# Patient Record
Sex: FEMALE | Race: WHITE | NOT HISPANIC OR LATINO | Employment: FULL TIME | URBAN - METROPOLITAN AREA
[De-identification: names, ages, dates, MRNs, and addresses within clinical notes are randomized per-mention and may not be internally consistent; named-entity substitution may affect disease eponyms.]

---

## 2018-05-10 ENCOUNTER — OFFICE VISIT CONVERTED (OUTPATIENT)
Dept: FAMILY MEDICINE CLINIC | Facility: CLINIC | Age: 58
End: 2018-05-10
Attending: NURSE PRACTITIONER

## 2018-06-05 ENCOUNTER — CONVERSION ENCOUNTER (OUTPATIENT)
Dept: GENERAL RADIOLOGY | Facility: HOSPITAL | Age: 58
End: 2018-06-05

## 2019-01-17 ENCOUNTER — OFFICE VISIT CONVERTED (OUTPATIENT)
Dept: FAMILY MEDICINE CLINIC | Facility: CLINIC | Age: 59
End: 2019-01-17
Attending: NURSE PRACTITIONER

## 2019-12-11 ENCOUNTER — OFFICE VISIT CONVERTED (OUTPATIENT)
Dept: FAMILY MEDICINE CLINIC | Facility: CLINIC | Age: 59
End: 2019-12-11
Attending: NURSE PRACTITIONER

## 2019-12-11 ENCOUNTER — HOSPITAL ENCOUNTER (OUTPATIENT)
Dept: FAMILY MEDICINE CLINIC | Facility: CLINIC | Age: 59
Discharge: HOME | End: 2019-12-11
Attending: NURSE PRACTITIONER

## 2019-12-11 LAB
ALBUMIN SERPL-MCNC: 4.5 G/DL (ref 3.5–5)
ALBUMIN/GLOB SERPL: 1.6 {RATIO} (ref 1.4–2.6)
ALP SERPL-CCNC: 87 U/L (ref 53–141)
ALT SERPL-CCNC: 22 U/L (ref 10–40)
ANION GAP SERPL CALC-SCNC: 19 MMOL/L (ref 8–19)
AST SERPL-CCNC: 22 U/L (ref 15–50)
BASOPHILS # BLD AUTO: 0.04 10*3/UL (ref 0–0.2)
BASOPHILS NFR BLD AUTO: 0.7 % (ref 0–3)
BILIRUB SERPL-MCNC: 0.75 MG/DL (ref 0.2–1.3)
BUN SERPL-MCNC: 16 MG/DL (ref 5–25)
BUN/CREAT SERPL: 22 {RATIO} (ref 6–20)
CALCIUM SERPL-MCNC: 9.6 MG/DL (ref 8.7–10.4)
CHLORIDE SERPL-SCNC: 104 MMOL/L (ref 99–111)
CHOLEST SERPL-MCNC: 203 MG/DL (ref 107–200)
CHOLEST/HDLC SERPL: 2.5 {RATIO} (ref 3–6)
CONV ABS IMM GRAN: 0.01 10*3/UL (ref 0–0.2)
CONV CO2: 24 MMOL/L (ref 22–32)
CONV IMMATURE GRAN: 0.2 % (ref 0–1.8)
CONV TOTAL PROTEIN: 7.4 G/DL (ref 6.3–8.2)
CREAT UR-MCNC: 0.73 MG/DL (ref 0.5–0.9)
DEPRECATED RDW RBC AUTO: 41.7 FL (ref 36.4–46.3)
EOSINOPHIL # BLD AUTO: 0.03 10*3/UL (ref 0–0.7)
EOSINOPHIL # BLD AUTO: 0.5 % (ref 0–7)
ERYTHROCYTE [DISTWIDTH] IN BLOOD BY AUTOMATED COUNT: 13 % (ref 11.7–14.4)
GFR SERPLBLD BASED ON 1.73 SQ M-ARVRAT: >60 ML/MIN/{1.73_M2}
GLOBULIN UR ELPH-MCNC: 2.9 G/DL (ref 2–3.5)
GLUCOSE SERPL-MCNC: 98 MG/DL (ref 65–99)
HCT VFR BLD AUTO: 46 % (ref 37–47)
HDLC SERPL-MCNC: 80 MG/DL (ref 40–60)
HGB BLD-MCNC: 14.9 G/DL (ref 12–16)
LDLC SERPL CALC-MCNC: 110 MG/DL (ref 70–100)
LYMPHOCYTES # BLD AUTO: 1.95 10*3/UL (ref 1–5)
LYMPHOCYTES NFR BLD AUTO: 33.1 % (ref 20–45)
MCH RBC QN AUTO: 28.4 PG (ref 27–31)
MCHC RBC AUTO-ENTMCNC: 32.4 G/DL (ref 33–37)
MCV RBC AUTO: 87.6 FL (ref 81–99)
MONOCYTES # BLD AUTO: 0.41 10*3/UL (ref 0.2–1.2)
MONOCYTES NFR BLD AUTO: 7 % (ref 3–10)
NEUTROPHILS # BLD AUTO: 3.45 10*3/UL (ref 2–8)
NEUTROPHILS NFR BLD AUTO: 58.5 % (ref 30–85)
NRBC CBCN: 0 % (ref 0–0.7)
OSMOLALITY SERPL CALC.SUM OF ELEC: 297 MOSM/KG (ref 273–304)
PLATELET # BLD AUTO: 247 10*3/UL (ref 130–400)
PMV BLD AUTO: 10 FL (ref 9.4–12.3)
POTASSIUM SERPL-SCNC: 3.9 MMOL/L (ref 3.5–5.3)
RBC # BLD AUTO: 5.25 10*6/UL (ref 4.2–5.4)
SODIUM SERPL-SCNC: 143 MMOL/L (ref 135–147)
TRIGL SERPL-MCNC: 66 MG/DL (ref 40–150)
TSH SERPL-ACNC: 1.56 M[IU]/L (ref 0.27–4.2)
VLDLC SERPL-MCNC: 13 MG/DL (ref 5–37)
WBC # BLD AUTO: 5.89 10*3/UL (ref 4.8–10.8)

## 2019-12-18 ENCOUNTER — HOSPITAL ENCOUNTER (OUTPATIENT)
Dept: FAMILY MEDICINE CLINIC | Facility: CLINIC | Age: 59
Discharge: HOME | End: 2019-12-18
Attending: NURSE PRACTITIONER

## 2019-12-18 ENCOUNTER — OFFICE VISIT CONVERTED (OUTPATIENT)
Dept: FAMILY MEDICINE CLINIC | Facility: CLINIC | Age: 59
End: 2019-12-18
Attending: NURSE PRACTITIONER

## 2019-12-18 ENCOUNTER — CONVERSION ENCOUNTER (OUTPATIENT)
Dept: FAMILY MEDICINE CLINIC | Facility: CLINIC | Age: 59
End: 2019-12-18

## 2019-12-19 LAB
CONV LAST MENSTURAL PERIOD: NORMAL
SPECIMEN SOURCE: NORMAL
SPECIMEN SOURCE: NORMAL
THIN PREP CVX: NORMAL

## 2020-01-20 ENCOUNTER — HOSPITAL ENCOUNTER (OUTPATIENT)
Dept: GENERAL RADIOLOGY | Facility: HOSPITAL | Age: 60
Discharge: HOME | End: 2020-01-20
Attending: NURSE PRACTITIONER

## 2020-06-10 ENCOUNTER — OFFICE VISIT CONVERTED (OUTPATIENT)
Dept: FAMILY MEDICINE CLINIC | Facility: CLINIC | Age: 60
End: 2020-06-10
Attending: NURSE PRACTITIONER

## 2020-10-07 ENCOUNTER — HOSPITAL ENCOUNTER (OUTPATIENT)
Dept: PREADMISSION TESTING | Facility: HOSPITAL | Age: 60
Discharge: HOME | End: 2020-10-07
Attending: INTERNAL MEDICINE

## 2020-10-08 LAB — SARS-COV-2 RNA SPEC QL NAA+PROBE: NOT DETECTED

## 2020-10-09 ENCOUNTER — OFFICE VISIT CONVERTED (OUTPATIENT)
Dept: FAMILY MEDICINE CLINIC | Facility: CLINIC | Age: 60
End: 2020-10-09
Attending: NURSE PRACTITIONER

## 2020-10-12 ENCOUNTER — HOSPITAL ENCOUNTER (OUTPATIENT)
Dept: GASTROENTEROLOGY | Facility: HOSPITAL | Age: 60
Setting detail: HOSPITAL OUTPATIENT SURGERY
Discharge: HOME | End: 2020-10-12
Attending: INTERNAL MEDICINE

## 2020-10-12 LAB — GLUCOSE BLD-MCNC: 113 MG/DL (ref 65–99)

## 2021-02-03 ENCOUNTER — HOSPITAL ENCOUNTER (OUTPATIENT)
Dept: GENERAL RADIOLOGY | Facility: HOSPITAL | Age: 61
Discharge: HOME | End: 2021-02-03
Attending: NURSE PRACTITIONER

## 2021-05-13 NOTE — PROGRESS NOTES
Progress Note      Patient Name: Divina Virk   Patient ID: [id number]   Sex: Female   YOB: 1960    Primary Care Provider: Nel MCCOY   Referring Provider: Nel MCCOY    Visit Date: Samantha 10, 2020    Provider: NADINE Batista   Location: [facility]   Location Address: [address] [place] [address]  [id number]   Location Phone: [phone]          Chief Complaint  · Acute Visit for Stress      History Of Present Illness  Divina Virk is a 59 year old /White female who presents for evaluation and treatment of:      Acute visit for stress.  pt has been exercising daily.   anxiety worse with working Covid hotline.    Pt also wants bites on back checked. Pt said she was at [place] this past weekend. pt has Clobetasol but has been using.       Past Medical History  Disease Name Date Onset Notes   Anemia --  --    Anxiety --  --    Depression --  --    Diabetes mellitus type II, controlled --  diet controlled    Menopausal symptom 09/14/2015 --    Pap smear for cervical cancer screening 12/18/19 WNL         Past Surgical History  Procedure Name Date Notes   Colonoscopy 09/11/15 2 small polyps   Dilatation and curettage 11/1/2011 --    Uterine ablation 11/1/2011 --          Allergy List  Allergen Name Date Reaction Notes   Wellbutrin --  --  feeling foggy         Family Medical History  Disease Name Relative/Age Notes   Adopted  --          Social History  Finding Status Start/Stop Quantity Notes   Alcohol Never --/-- --  --    Tobacco Former --/58 1 ppd Quit in 12/2018         Immunizations  NameDate Admin Mfg Trade Name Lot Number Route Inj VIS Given VIS Publication   Lvgjxyqmm17/11/2019 PMC Fluzone Quadrivalent [id number] IM RD 12/11/2019    Comments: injection given. Pt tolerated well. NDC 67835-118-93   Hundrbptg74/17/2019 PMC Fluzone Quadrivalent [id number] IM  01/17/2019 08/07/2015   Comments:          Review of  "Systems  · Constitutional  o Denies  o : fever, fatigue, weight loss, weight gain  · Cardiovascular  o Denies  o : lower extremity edema, claudication, chest pressure, palpitations  · Respiratory  o Denies  o : shortness of breath, wheezing, frequent cough, hemoptysis, dyspnea on exertion  · Gastrointestinal  o Denies  o : nausea, vomiting, diarrhea, constipation, abdominal pain  · Integument  o Admits  o : new skin lesions  · Psychiatric  o Admits  o : anxiety  o Denies  o : depression      Vitals  Date Time BP Position Site L\R Cuff Size HR RR TEMP (F) WT  HT  BMI kg/m2 BSA m2 O2 Sat HC       12/11/2019 08:25 /55 Sitting    69 - R 12 98.4 146lbs 16oz 5'  4\" 25.23 1.74 96 %    12/18/2019 10:25 /97 Sitting    92 - R 12  150lbs 0oz 5'  4\" 25.75 1.75 96 %    06/10/2020 08:54 /53 Sitting    78 - R 12 98.8 148lbs 6oz 5'  4\" 25.47 1.74 97 %          Physical Examination  · Constitutional  o Appearance  o : well-nourished, in no acute distress  · Eyes  o Conjunctivae  o : conjunctivae normal  o Sclerae  o : sclerae white  o Pupils and Irises  o : pupils equal and round  o Eyelids/Ocular Adnexae  o : eyelid appearance normal, no exudates present  · Respiratory  o Respiratory Effort  o : breathing unlabored  o Inspection of Chest  o : normal appearance  o Auscultation of Lungs  o : normal breath sounds throughout inspiration and expiration  · Cardiovascular  o Heart  o :   § Auscultation of Heart  § : regular rate and rhythm, no murmurs, gallops or rubs  · Gastrointestinal  o Abdominal Examination  o : abdomen nontender to palpation, tone normal without rigidity or guarding, no masses present, bowel sounds present  · Skin and Subcutaneous Tissue  o General Inspection  o : excoriations with erythema to back.  o Body Hair  o : hair normal for age, general body hair distribution normal for age  o Digits and Nails  o : no clubbing, cyanosis, deformities or edema present, normal appearing " nails  · Neurologic  o Mental Status Examination  o :   § Orientation  § : grossly oriented to person, place and time  o Gait and Station  o : normal gait, able to stand without difficulty  · Psychiatric  o Judgement and Insight  o : judgment and insight intact  o Mood and Affect  o : mood normal, affect appropriate          Assessment  · Anxiety disorder     300.00/F41.9  take hydroxyzine qid prn.  · Insect bite       Bitten or stung by nonvenomous insect and other nonvenomous arthropods, initial encounter     919.4/W57.XXXA      Plan  · Orders  o ACO-39: Current medications updated and reviewed () - - 06/10/2020  · Medications  o Vistaril 25 mg oral capsule   SIG: take 1 capsule by oral route 3 times a day as needed for 7 days for itching   DISP: (21) capsules with 0 refills  Prescribed on 06/10/2020     o Medications have been Reconciled  o Transition of Care or Provider Policy  · Instructions  o Patient was given an SSRI/SSNRI medication and warned of possible side effects of the medication including potential for increased risk of suicidal thoughts and feelings. Patient was instructed that if they begin to exhibit any of these effects they will discontinue the medication immediately and contact our office or the ER ASAP.  o Patient was educated/instructed on their diagnosis, treatment and medications prior to discharge from the clinic today.  o Call the office with any concerns or questions.  · Disposition  o Call or Return if symptoms worsen or persist.            Electronically Signed by: NADINE Batista -Author on Samantha 10, 2020 09:18:12 AM
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
## 2021-05-14 VITALS
DIASTOLIC BLOOD PRESSURE: 79 MMHG | RESPIRATION RATE: 18 BRPM | OXYGEN SATURATION: 94 % | TEMPERATURE: 99.2 F | HEART RATE: 85 BPM | HEIGHT: 64 IN | BODY MASS INDEX: 25.78 KG/M2 | SYSTOLIC BLOOD PRESSURE: 151 MMHG | WEIGHT: 151 LBS

## 2021-05-15 VITALS
OXYGEN SATURATION: 96 % | HEIGHT: 64 IN | RESPIRATION RATE: 12 BRPM | HEART RATE: 69 BPM | SYSTOLIC BLOOD PRESSURE: 134 MMHG | BODY MASS INDEX: 25.1 KG/M2 | DIASTOLIC BLOOD PRESSURE: 55 MMHG | TEMPERATURE: 98.4 F | WEIGHT: 147 LBS

## 2021-05-15 VITALS
SYSTOLIC BLOOD PRESSURE: 151 MMHG | BODY MASS INDEX: 25.61 KG/M2 | OXYGEN SATURATION: 96 % | WEIGHT: 150 LBS | HEART RATE: 92 BPM | RESPIRATION RATE: 12 BRPM | DIASTOLIC BLOOD PRESSURE: 97 MMHG | HEIGHT: 64 IN

## 2021-05-15 VITALS
SYSTOLIC BLOOD PRESSURE: 100 MMHG | HEIGHT: 64 IN | HEART RATE: 78 BPM | TEMPERATURE: 98.8 F | DIASTOLIC BLOOD PRESSURE: 53 MMHG | OXYGEN SATURATION: 97 % | RESPIRATION RATE: 12 BRPM | WEIGHT: 148.37 LBS | BODY MASS INDEX: 25.33 KG/M2

## 2021-05-16 VITALS
WEIGHT: 139 LBS | HEIGHT: 64 IN | TEMPERATURE: 97.6 F | HEART RATE: 81 BPM | BODY MASS INDEX: 23.73 KG/M2 | RESPIRATION RATE: 21 BRPM | OXYGEN SATURATION: 99 % | SYSTOLIC BLOOD PRESSURE: 142 MMHG | DIASTOLIC BLOOD PRESSURE: 69 MMHG

## 2021-05-16 VITALS
WEIGHT: 152 LBS | OXYGEN SATURATION: 98 % | BODY MASS INDEX: 25.95 KG/M2 | SYSTOLIC BLOOD PRESSURE: 139 MMHG | HEIGHT: 64 IN | RESPIRATION RATE: 23 BRPM | TEMPERATURE: 97.8 F | HEART RATE: 76 BPM | DIASTOLIC BLOOD PRESSURE: 74 MMHG

## 2021-06-24 ENCOUNTER — IMMUNIZATION (OUTPATIENT)
Dept: VACCINE CLINIC | Facility: HOSPITAL | Age: 61
End: 2021-06-24

## 2021-06-24 PROCEDURE — 91300 HC SARSCOV02 VAC 30MCG/0.3ML IM: CPT | Performed by: INTERNAL MEDICINE

## 2021-06-24 PROCEDURE — 0001A: CPT | Performed by: INTERNAL MEDICINE

## 2021-08-04 ENCOUNTER — OFFICE VISIT (OUTPATIENT)
Dept: FAMILY MEDICINE CLINIC | Facility: CLINIC | Age: 61
End: 2021-08-04

## 2021-08-04 VITALS
HEART RATE: 93 BPM | BODY MASS INDEX: 25.13 KG/M2 | DIASTOLIC BLOOD PRESSURE: 73 MMHG | RESPIRATION RATE: 18 BRPM | OXYGEN SATURATION: 96 % | TEMPERATURE: 99.7 F | HEIGHT: 64 IN | SYSTOLIC BLOOD PRESSURE: 126 MMHG | WEIGHT: 147.2 LBS

## 2021-08-04 DIAGNOSIS — Z00.00 ANNUAL PHYSICAL EXAM: Primary | ICD-10-CM

## 2021-08-04 DIAGNOSIS — L30.9 DERMATITIS: ICD-10-CM

## 2021-08-04 DIAGNOSIS — Z13.220 LIPID SCREENING: ICD-10-CM

## 2021-08-04 DIAGNOSIS — Z13.29 SCREENING FOR THYROID DISORDER: ICD-10-CM

## 2021-08-04 DIAGNOSIS — Z01.89 ENCOUNTER FOR ROUTINE LABORATORY TESTING: ICD-10-CM

## 2021-08-04 DIAGNOSIS — Z12.31 VISIT FOR SCREENING MAMMOGRAM: ICD-10-CM

## 2021-08-04 PROBLEM — E11.9 DIABETES MELLITUS TYPE II, CONTROLLED: Status: ACTIVE | Noted: 2021-08-04

## 2021-08-04 PROBLEM — F41.9 ANXIETY: Status: ACTIVE | Noted: 2021-08-04

## 2021-08-04 PROBLEM — D64.9 ANEMIA: Status: ACTIVE | Noted: 2021-08-04

## 2021-08-04 PROBLEM — F32.A DEPRESSION: Status: ACTIVE | Noted: 2021-08-04

## 2021-08-04 LAB
ALBUMIN SERPL-MCNC: 4.3 G/DL (ref 3.5–5.2)
ALBUMIN/GLOB SERPL: 1.7 G/DL
ALP SERPL-CCNC: 83 U/L (ref 39–117)
ALT SERPL W P-5'-P-CCNC: 23 U/L (ref 1–33)
ANION GAP SERPL CALCULATED.3IONS-SCNC: 8.6 MMOL/L (ref 5–15)
AST SERPL-CCNC: 20 U/L (ref 1–32)
BASOPHILS # BLD AUTO: 0.05 10*3/MM3 (ref 0–0.2)
BASOPHILS NFR BLD AUTO: 0.7 % (ref 0–1.5)
BILIRUB SERPL-MCNC: 0.6 MG/DL (ref 0–1.2)
BUN SERPL-MCNC: 14 MG/DL (ref 8–23)
BUN/CREAT SERPL: 20 (ref 7–25)
CALCIUM SPEC-SCNC: 9.3 MG/DL (ref 8.6–10.5)
CHLORIDE SERPL-SCNC: 103 MMOL/L (ref 98–107)
CHOLEST SERPL-MCNC: 210 MG/DL (ref 0–200)
CO2 SERPL-SCNC: 25.4 MMOL/L (ref 22–29)
CREAT SERPL-MCNC: 0.7 MG/DL (ref 0.57–1)
DEPRECATED RDW RBC AUTO: 42.5 FL (ref 37–54)
EOSINOPHIL # BLD AUTO: 0.06 10*3/MM3 (ref 0–0.4)
EOSINOPHIL NFR BLD AUTO: 0.8 % (ref 0.3–6.2)
ERYTHROCYTE [DISTWIDTH] IN BLOOD BY AUTOMATED COUNT: 13.2 % (ref 12.3–15.4)
GFR SERPL CREATININE-BSD FRML MDRD: 85 ML/MIN/1.73
GLOBULIN UR ELPH-MCNC: 2.5 GM/DL
GLUCOSE SERPL-MCNC: 102 MG/DL (ref 65–99)
HCT VFR BLD AUTO: 44.3 % (ref 34–46.6)
HDLC SERPL-MCNC: 70 MG/DL (ref 40–60)
HGB BLD-MCNC: 14.4 G/DL (ref 12–15.9)
IMM GRANULOCYTES # BLD AUTO: 0.01 10*3/MM3 (ref 0–0.05)
IMM GRANULOCYTES NFR BLD AUTO: 0.1 % (ref 0–0.5)
LDLC SERPL CALC-MCNC: 111 MG/DL (ref 0–100)
LDLC/HDLC SERPL: 1.52 {RATIO}
LYMPHOCYTES # BLD AUTO: 2.08 10*3/MM3 (ref 0.7–3.1)
LYMPHOCYTES NFR BLD AUTO: 29.3 % (ref 19.6–45.3)
MCH RBC QN AUTO: 28.6 PG (ref 26.6–33)
MCHC RBC AUTO-ENTMCNC: 32.5 G/DL (ref 31.5–35.7)
MCV RBC AUTO: 87.9 FL (ref 79–97)
MONOCYTES # BLD AUTO: 0.45 10*3/MM3 (ref 0.1–0.9)
MONOCYTES NFR BLD AUTO: 6.3 % (ref 5–12)
NEUTROPHILS NFR BLD AUTO: 4.46 10*3/MM3 (ref 1.7–7)
NEUTROPHILS NFR BLD AUTO: 62.8 % (ref 42.7–76)
NRBC BLD AUTO-RTO: 0 /100 WBC (ref 0–0.2)
PLATELET # BLD AUTO: 256 10*3/MM3 (ref 140–450)
PMV BLD AUTO: 10.3 FL (ref 6–12)
POTASSIUM SERPL-SCNC: 3.9 MMOL/L (ref 3.5–5.2)
PROT SERPL-MCNC: 6.8 G/DL (ref 6–8.5)
RBC # BLD AUTO: 5.04 10*6/MM3 (ref 3.77–5.28)
SODIUM SERPL-SCNC: 137 MMOL/L (ref 136–145)
TRIGL SERPL-MCNC: 169 MG/DL (ref 0–150)
TSH SERPL DL<=0.05 MIU/L-ACNC: 1.2 UIU/ML (ref 0.27–4.2)
VLDLC SERPL-MCNC: 29 MG/DL (ref 5–40)
WBC # BLD AUTO: 7.11 10*3/MM3 (ref 3.4–10.8)

## 2021-08-04 PROCEDURE — 85025 COMPLETE CBC W/AUTO DIFF WBC: CPT | Performed by: NURSE PRACTITIONER

## 2021-08-04 PROCEDURE — 36415 COLL VENOUS BLD VENIPUNCTURE: CPT | Performed by: NURSE PRACTITIONER

## 2021-08-04 PROCEDURE — 80061 LIPID PANEL: CPT | Performed by: NURSE PRACTITIONER

## 2021-08-04 PROCEDURE — 80053 COMPREHEN METABOLIC PANEL: CPT | Performed by: NURSE PRACTITIONER

## 2021-08-04 PROCEDURE — 84443 ASSAY THYROID STIM HORMONE: CPT | Performed by: NURSE PRACTITIONER

## 2021-08-04 PROCEDURE — 99396 PREV VISIT EST AGE 40-64: CPT | Performed by: NURSE PRACTITIONER

## 2021-08-04 RX ORDER — METHYLPREDNISOLONE 4 MG/1
TABLET ORAL
Qty: 1 EACH | Refills: 0 | Status: SHIPPED | OUTPATIENT
Start: 2021-08-04

## 2021-08-04 NOTE — PROGRESS NOTES
Chief Complaint  Rash    Subjective          Divina JENNY Virk is a 60 y.o. female who presents to [facility]     History of Present Illness     Skin lesion - dermatitis with excoriations.    Pt due for routine labs.         PHQ-2 Total Score: 0   PHQ-9 Total Score: 0       Review of Systems   Constitutional: Negative for chills, fatigue and fever.   Respiratory: Negative for cough and shortness of breath.    Cardiovascular: Negative for chest pain and palpitations.   Gastrointestinal: Negative for constipation, diarrhea, nausea and vomiting.   Musculoskeletal: Negative for back pain and neck pain.   Skin: Positive for rash.   Neurological: Negative for dizziness and headaches.          Medical History: has a past medical history of Anemia, Anxiety, Depression, Diabetes mellitus type II, controlled (CMS/[facility]), and Menopausal symptom (09/14/2015).     Surgical History: has a past surgical history that includes Dilation and curettage of uterus (11/01/2011); Laser ablation (11/01/2011); and Colonoscopy (10/12/2020).     Family History: family history is not on file. She was adopted.     Social History: reports that she quit smoking about 2 years ago. She smoked 1.00 pack per day. She has never used smokeless tobacco. She reports that she does not drink alcohol and does not use drugs.    Allergies: Bupropion      Health Maintenance Due   Topic Date Due   • URINE MICROALBUMIN  Never done   • ANNUAL PHYSICAL  Never done   • TDAP/TD VACCINES (1 - Tdap) Never done   • ZOSTER VACCINE (1 of 2) Never done   • Pneumococcal Vaccine 0-64 (1 of 2 - PPSV23) 12/04/2020   • HEPATITIS C SCREENING  Never done   • DIABETIC FOOT EXAM  Never done   • PAP SMEAR  06/24/2021   • HEMOGLOBIN A1C  06/24/2021   • DIABETIC EYE EXAM  Never done            Current Outpatient Medications:   •  methylPREDNISolone (MEDROL) 4 MG dose pack, Take as directed on package instructions., Disp: 1 each, Rfl: 0      Immunization  "History   Administered Date(s) Administered   • COVID-19 (PFIZER) 06/24/2021, 07/21/2021   • Flu Vaccine Quad PF >18YRS 10/09/2020   • Pneumococcal Conjugate 13-Valent (PCV13) 10/09/2020         Objective       Vitals:    08/04/21 0945 08/04/21 0950   BP: 161/51 126/73   Pulse: 85 93   Resp: 18    Temp: 99.7 °F (37.6 °C)    TempSrc: Temporal    SpO2: 96%    Height: 162.6 cm (64\")    PainSc: 0-No pain       Body mass index is 25.92 kg/m².   Wt Readings from Last 3 Encounters:   10/09/20 68.5 kg (151 lb)   06/10/20 67.3 kg (148 lb 6 oz)   12/18/19 68 kg (150 lb)      BP Readings from Last 3 Encounters:   08/04/21 126/73   10/09/20 151/79   06/10/20 100/53        Physical Exam  Vitals reviewed.   Constitutional:       Appearance: Normal appearance. She is well-developed.   HENT:      Head: Normocephalic and atraumatic.   Eyes:      Conjunctiva/sclera: Conjunctivae normal.      Pupils: Pupils are equal, round, and reactive to light.   Cardiovascular:      Rate and Rhythm: Normal rate and regular rhythm.      Heart sounds: Normal heart sounds. No murmur heard.     Pulmonary:      Effort: Pulmonary effort is normal.      Breath sounds: Normal breath sounds. No wheezing or rhonchi.   Abdominal:      General: Bowel sounds are normal. There is no distension.      Palpations: Abdomen is soft.      Tenderness: There is no abdominal tenderness.   Skin:     General: Skin is warm and dry.   Neurological:      Mental Status: She is alert and oriented to person, place, and time.   Psychiatric:         Mood and Affect: Mood and affect normal.         Behavior: Behavior normal.         Thought Content: Thought content normal.         Judgment: Judgment normal.         Result Review :               Data reviewed: mammo             Assessment and Plan        Diagnoses and all orders for this visit:    1. Annual physical exam (Primary)    2. Encounter for routine laboratory testing  -     Comprehensive Metabolic Panel  -     CBC & " Differential  -     TSH  -     Lipid Panel    3. Screening for thyroid disorder  -     TSH    4. Lipid screening  -     Comprehensive Metabolic Panel  -     Lipid Panel    5. Visit for screening mammogram  -     Mammo Screening Digital Tomosynthesis Bilateral With CAD; Future    6. Dermatitis  -     methylPREDNISolone (MEDROL) 4 MG dose pack; Take as directed on package instructions.  Dispense: 1 each; Refill: 0          Follow Up     Return for Pending results.    Patient was given instructions and counseling regarding her condition or for health maintenance advice. Please see specific information pulled into the AVS if appropriate.     NADINE Batista

## 2021-09-07 ENCOUNTER — TELEPHONE (OUTPATIENT)
Dept: FAMILY MEDICINE CLINIC | Facility: CLINIC | Age: 61
End: 2021-09-07

## 2021-09-07 NOTE — TELEPHONE ENCOUNTER
Caller: Divina Virk    Relationship to patient: Self    Best call back number: [phone]    Patient is needing: TO BE SEEN.  GOT DOG BIT OVER THE WEEKEND, WENT TO URGENT CARE THEY TOLD HER TO GO SEE HER PRIMARY CARE DOCTOR WITHIN 10 DAYS.   HUB IS UNABLE TO SCHEDULE ANYTHING WITHIN THAT TIME FRAME

## 2021-09-08 ENCOUNTER — OFFICE VISIT (OUTPATIENT)
Dept: FAMILY MEDICINE CLINIC | Facility: CLINIC | Age: 61
End: 2021-09-08

## 2021-09-08 VITALS
DIASTOLIC BLOOD PRESSURE: 69 MMHG | RESPIRATION RATE: 18 BRPM | OXYGEN SATURATION: 93 % | HEIGHT: 64 IN | BODY MASS INDEX: 25.27 KG/M2 | SYSTOLIC BLOOD PRESSURE: 125 MMHG | HEART RATE: 82 BPM | TEMPERATURE: 98.4 F

## 2021-09-08 DIAGNOSIS — T14.8XXA PUNCTURE WOUND: ICD-10-CM

## 2021-09-08 DIAGNOSIS — T14.8XXA BITE BY ANIMAL: Primary | ICD-10-CM

## 2021-09-08 PROCEDURE — 99213 OFFICE O/P EST LOW 20 MIN: CPT | Performed by: NURSE PRACTITIONER

## 2021-09-08 NOTE — PROGRESS NOTES
Chief Complaint  Animal Bite (Rabies check due to bit by an unvaccinated dog)    Subjective          Divina Virk is a 60 y.o. female who presents to [facility]     History of Present Illness    Bit on Saturday, right thumb.     Pt reports the dog was not being aggressive but she had dropped food. Pt reports she feds the dog often. The dog is a basset hound.          PHQ-2 Total Score:     PHQ-9 Total Score:         Review of Systems   Constitutional: Negative for chills and fever.   Skin: Positive for wound (right thumb without erythema or drainage).          Medical History: has a past medical history of Anemia, Anxiety, Depression, Diabetes mellitus type II, controlled (CMS/[facility]), and Menopausal symptom (09/14/2015).     Surgical History: has a past surgical history that includes Dilation and curettage of uterus (11/01/2011); Laser ablation (11/01/2011); and Colonoscopy (10/12/2020).     Family History: family history is not on file. She was adopted.     Social History: reports that she quit smoking about 2 years ago. She smoked 1.00 pack per day. She has never used smokeless tobacco. She reports that she does not drink alcohol and does not use drugs.    Allergies: Bupropion      Health Maintenance Due   Topic Date Due   • URINE MICROALBUMIN  Never done   • TDAP/TD VACCINES (1 - Tdap) Never done   • ZOSTER VACCINE (1 of 2) Never done   • Pneumococcal Vaccine 0-64 (1 of 2 - PPSV23) 12/04/2020   • HEPATITIS C SCREENING  Never done   • DIABETIC FOOT EXAM  Never done   • PAP SMEAR  06/24/2021   • HEMOGLOBIN A1C  06/24/2021   • DIABETIC EYE EXAM  Never done            Current Outpatient Medications:   •  methylPREDNISolone (MEDROL) 4 MG dose pack, Take as directed on package instructions., Disp: 1 each, Rfl: 0      Immunization History   Administered Date(s) Administered   • COVID-19 (PFIZER) 06/24/2021, 07/21/2021   • Flu Vaccine Quad PF >18YRS 10/09/2020   • Pneumococcal  "Conjugate 13-Valent (PCV13) 10/09/2020         Objective       Vitals:    09/08/21 0708   BP: 125/69   Pulse: 82   Resp: 18   Temp: 98.4 °F (36.9 °C)   TempSrc: Temporal   SpO2: 93%   Height: 162.6 cm (64\")   PainSc: 0-No pain      Body mass index is 25.27 kg/m².   Wt Readings from Last 3 Encounters:   08/04/21 66.8 kg (147 lb 3.2 oz)   10/09/20 68.5 kg (151 lb)   06/10/20 67.3 kg (148 lb 6 oz)      BP Readings from Last 3 Encounters:   09/08/21 125/69   08/04/21 126/73   10/09/20 151/79        Physical Exam  Constitutional:       Appearance: Normal appearance.   HENT:      Head: Normocephalic and atraumatic.      Nose: Nose normal.   Musculoskeletal:      Cervical back: Normal range of motion.   Skin:     General: Skin is warm and dry.      Findings: Wound present.      Comments: Puncture wound on right thumb without erythema or drainage.   Neurological:      Mental Status: She is alert.       Skin clipped from right thumb.      Result Review :               Assessment and Plan        Diagnoses and all orders for this visit:    1. Bite by animal (Primary)    2. Puncture wound    Keep area clean and dry. Monitor for signs and symptoms of infection, redness and drainage. Seek medical attention if noted.       Follow Up     Return if symptoms worsen or fail to improve.    Patient was given instructions and counseling regarding her condition or for health maintenance advice. Please see specific information pulled into the AVS if appropriate.     NADINE Batista    "

## 2021-09-08 NOTE — PATIENT INSTRUCTIONS
Puncture Wound  A puncture wound is an injury that is caused by a sharp, thin object that goes through your skin. A puncture wound usually does not leave a large opening in your skin, so it may not bleed a lot. However, when you get a puncture wound, dirt or other materials (foreign bodies) can be forced into your wound and can break off inside. This increases the chance of infection, such as tetanus. There are many sharp, pointed objects that can cause puncture wounds, including teeth, nails, splinters of glass, fishhooks, and needles.  Treatment may include the following steps:  · Washing out the wound with a germ-free (sterile) salt-water solution.  · Having surgery to open the wound and remove materials from it.  · Closing the wound with stitches (sutures).  · Covering the wound with antibiotic ointment and a bandage (dressing).  Depending on what caused the injury, you may also need a tetanus shot or a rabies shot.  Follow these instructions at home:  Medicines  · Take or apply over-the-counter and prescription medicines only as told by your doctor.  · If you were prescribed an antibiotic medicine, take or apply it as told by your doctor. Do not stop using the antibiotic even if your condition starts to get better.  Bathing  · Keep the bandage dry as told by your doctor.  · Do not take baths, swim, or use a hot tub until your doctor approves. Ask your doctor if you may take showers. You may only be allowed to take sponge baths.  Wound care    · There are many ways to close and cover a wound. For example, a wound can be closed with stitches, skin glue, or skin tape (adhesive strips). Follow instructions from your doctor about how to take care of your wound. Make sure you:  ? Wash your hands with soap and water before and after you change your bandage. If you cannot use soap and water, use hand .  ? Change your bandage as told by your doctor.  ? Leave stitches, skin glue, or skin tape strips in place.  They may need to stay in place for 2 weeks or longer. If tape strips get loose and curl up, you may trim the loose edges. Do not remove tape strips completely unless your doctor says it is okay.  · Clean the wound as told by your doctor.  · Do not scratch or pick at the wound.  · Check your wound every day for signs of infection. Watch for:  ? Redness, swelling, or pain.  ? Fluid or blood.  ? Warmth.  ? Pus or a bad smell.    General instructions  · Raise (elevate) the injured area above the level of your heart while you are sitting or lying down.  · If your puncture wound is in your foot, ask your doctor if you need to avoid putting weight on your foot and for how long. Use crutches as told by your doctor.  · Keep all follow-up visits as told by your doctor. This is important.  Contact a doctor if:  · You got a tetanus shot and you have any of these problems at the injection site:  ? Swelling.  ? Very bad pain.  ? Redness.  ? Bleeding.  · You have a fever.  · Your stitches come out.  · You notice a bad smell coming from your wound or your bandage.  · You notice something coming out of the wound, such as wood or glass.  · Medicine does not help your pain.  · You have more redness, swelling, or pain at the site of your wound.  · You have fluid, blood, or pus coming from your wound.  · You notice a change in the color of your skin near your wound.  · You need to change the bandage often because fluid, blood, or pus is coming from the wound.  · You start to have a new rash.  · You start to lose feeling (have numbness) around the wound.  · You have warmth around your wound.  Get help right away if:  · You have very bad swelling around the wound.  · Your pain quickly gets worse and is very bad.  · You start to get painful skin lumps.  · You have a red streak going away from your wound.  · The wound is on your hand or foot and you:  ? Cannot move a finger or toe like normal.  ? Notice that your fingers or toes look pale  or blue.  Summary  · A puncture wound is an injury that is caused by a sharp, thin object that goes through your skin.  · Treatment may include washing out the wound, having surgery to open the wound to clean it, closing the wound, and covering the wound with a bandage.  · Follow instructions from your doctor about how to take care of your wound.  · Contact your doctor if you have more redness, swelling, or pain at the site of your wound.  · Keep all follow-up visits as told by your doctor. This is important.  This information is not intended to replace advice given to you by your health care provider. Make sure you discuss any questions you have with your health care provider.  Document Revised: 04/27/2021 Document Reviewed: 07/25/2019  Elsevier Patient Education © 2021 [employer]

## 2022-02-14 ENCOUNTER — HOSPITAL ENCOUNTER (OUTPATIENT)
Dept: MAMMOGRAPHY | Facility: HOSPITAL | Age: 62
Discharge: HOME | End: 2022-02-14
Admitting: NURSE PRACTITIONER

## 2022-02-14 DIAGNOSIS — Z12.31 VISIT FOR SCREENING MAMMOGRAM: ICD-10-CM

## 2022-02-14 PROCEDURE — 77067 SCR MAMMO BI INCL CAD: CPT

## 2022-02-14 PROCEDURE — 77063 BREAST TOMOSYNTHESIS BI: CPT

## 2023-02-09 ENCOUNTER — APPOINTMENT (OUTPATIENT)
Dept: CT IMAGING | Facility: HOSPITAL | Age: 63
End: 2023-02-09
Payer: COMMERCIAL

## 2023-02-09 ENCOUNTER — HOSPITAL ENCOUNTER (EMERGENCY)
Facility: HOSPITAL | Age: 63
Discharge: HOME | End: 2023-02-09
Attending: EMERGENCY MEDICINE | Admitting: EMERGENCY MEDICINE
Payer: COMMERCIAL

## 2023-02-09 ENCOUNTER — APPOINTMENT (OUTPATIENT)
Dept: GENERAL RADIOLOGY | Facility: HOSPITAL | Age: 63
End: 2023-02-09
Payer: COMMERCIAL

## 2023-02-09 VITALS
BODY MASS INDEX: 26.16 KG/M2 | SYSTOLIC BLOOD PRESSURE: 152 MMHG | HEIGHT: 64 IN | TEMPERATURE: 98.5 F | HEART RATE: 94 BPM | WEIGHT: 153.22 LBS | DIASTOLIC BLOOD PRESSURE: 88 MMHG | RESPIRATION RATE: 16 BRPM | OXYGEN SATURATION: 97 %

## 2023-02-09 DIAGNOSIS — R42 VERTIGO: Primary | ICD-10-CM

## 2023-02-09 LAB
ALBUMIN SERPL-MCNC: 4.7 G/DL (ref 3.5–5.2)
ALBUMIN/GLOB SERPL: 1.6 G/DL
ALP SERPL-CCNC: 98 U/L (ref 39–117)
ALT SERPL W P-5'-P-CCNC: 29 U/L (ref 1–33)
ANION GAP SERPL CALCULATED.3IONS-SCNC: 11.7 MMOL/L (ref 5–15)
AST SERPL-CCNC: 22 U/L (ref 1–32)
BASOPHILS # BLD AUTO: 0.05 10*3/MM3 (ref 0–0.2)
BASOPHILS NFR BLD AUTO: 0.6 % (ref 0–1.5)
BILIRUB SERPL-MCNC: 0.7 MG/DL (ref 0–1.2)
BUN SERPL-MCNC: 16 MG/DL (ref 8–23)
BUN/CREAT SERPL: 23.5 (ref 7–25)
CALCIUM SPEC-SCNC: 9.8 MG/DL (ref 8.6–10.5)
CHLORIDE SERPL-SCNC: 102 MMOL/L (ref 98–107)
CO2 SERPL-SCNC: 26.3 MMOL/L (ref 22–29)
CREAT SERPL-MCNC: 0.68 MG/DL (ref 0.57–1)
DEPRECATED RDW RBC AUTO: 38.8 FL (ref 37–54)
EGFRCR SERPLBLD CKD-EPI 2021: 98 ML/MIN/1.73
EOSINOPHIL # BLD AUTO: 0.01 10*3/MM3 (ref 0–0.4)
EOSINOPHIL NFR BLD AUTO: 0.1 % (ref 0.3–6.2)
ERYTHROCYTE [DISTWIDTH] IN BLOOD BY AUTOMATED COUNT: 12.4 % (ref 12.3–15.4)
GLOBULIN UR ELPH-MCNC: 2.9 GM/DL
GLUCOSE SERPL-MCNC: 158 MG/DL (ref 65–99)
HCT VFR BLD AUTO: 45.8 % (ref 34–46.6)
HGB BLD-MCNC: 15.4 G/DL (ref 12–15.9)
HOLD SPECIMEN: NORMAL
HOLD SPECIMEN: NORMAL
IMM GRANULOCYTES # BLD AUTO: 0.02 10*3/MM3 (ref 0–0.05)
IMM GRANULOCYTES NFR BLD AUTO: 0.2 % (ref 0–0.5)
LYMPHOCYTES # BLD AUTO: 1.05 10*3/MM3 (ref 0.7–3.1)
LYMPHOCYTES NFR BLD AUTO: 12.1 % (ref 19.6–45.3)
MAGNESIUM SERPL-MCNC: 1.9 MG/DL (ref 1.6–2.4)
MCH RBC QN AUTO: 28.8 PG (ref 26.6–33)
MCHC RBC AUTO-ENTMCNC: 33.6 G/DL (ref 31.5–35.7)
MCV RBC AUTO: 85.8 FL (ref 79–97)
MONOCYTES # BLD AUTO: 0.27 10*3/MM3 (ref 0.1–0.9)
MONOCYTES NFR BLD AUTO: 3.1 % (ref 5–12)
NEUTROPHILS NFR BLD AUTO: 7.31 10*3/MM3 (ref 1.7–7)
NEUTROPHILS NFR BLD AUTO: 83.9 % (ref 42.7–76)
NRBC BLD AUTO-RTO: 0 /100 WBC (ref 0–0.2)
PLATELET # BLD AUTO: 258 10*3/MM3 (ref 140–450)
PMV BLD AUTO: 9.6 FL (ref 6–12)
POTASSIUM SERPL-SCNC: 4.1 MMOL/L (ref 3.5–5.2)
PROT SERPL-MCNC: 7.6 G/DL (ref 6–8.5)
RBC # BLD AUTO: 5.34 10*6/MM3 (ref 3.77–5.28)
SODIUM SERPL-SCNC: 140 MMOL/L (ref 136–145)
TROPONIN T SERPL HS-MCNC: <6 NG/L
WBC NRBC COR # BLD: 8.71 10*3/MM3 (ref 3.4–10.8)
WHOLE BLOOD HOLD COAG: NORMAL
WHOLE BLOOD HOLD SPECIMEN: NORMAL

## 2023-02-09 PROCEDURE — 71045 X-RAY EXAM CHEST 1 VIEW: CPT

## 2023-02-09 PROCEDURE — 93005 ELECTROCARDIOGRAM TRACING: CPT

## 2023-02-09 PROCEDURE — 70450 CT HEAD/BRAIN W/O DYE: CPT

## 2023-02-09 PROCEDURE — 85025 COMPLETE CBC W/AUTO DIFF WBC: CPT

## 2023-02-09 PROCEDURE — 84484 ASSAY OF TROPONIN QUANT: CPT | Performed by: EMERGENCY MEDICINE

## 2023-02-09 PROCEDURE — 83735 ASSAY OF MAGNESIUM: CPT | Performed by: EMERGENCY MEDICINE

## 2023-02-09 PROCEDURE — 80053 COMPREHEN METABOLIC PANEL: CPT | Performed by: EMERGENCY MEDICINE

## 2023-02-09 PROCEDURE — 36415 COLL VENOUS BLD VENIPUNCTURE: CPT

## 2023-02-09 PROCEDURE — 99283 EMERGENCY DEPT VISIT LOW MDM: CPT

## 2023-02-09 PROCEDURE — 93005 ELECTROCARDIOGRAM TRACING: CPT | Performed by: EMERGENCY MEDICINE

## 2023-02-09 RX ORDER — MECLIZINE HYDROCHLORIDE 25 MG/1
25 TABLET ORAL 3 TIMES DAILY PRN
Qty: 30 TABLET | Refills: 0 | Status: SHIPPED | OUTPATIENT
Start: 2023-02-09

## 2023-02-09 RX ORDER — SODIUM CHLORIDE 0.9 % (FLUSH) 0.9 %
10 SYRINGE (ML) INJECTION AS NEEDED
Status: DISCONTINUED | OUTPATIENT
Start: 2023-02-09 | End: 2023-02-09 | Stop reason: HOSPADM

## 2023-02-09 NOTE — ED PROVIDER NOTES
Time: 6:52 AM EST  Date of encounter:  2023  Independent Historian/Clinical History and Information was obtained by:   Patient  Chief Complaint: dizziness    History is limited by: N/A    History of Present Illness:  Patient is a 62 y.o. year old female who presents to the emergency department for evaluation of dizziness since 0130. Pt woke up at 0130 to the room spinning, she had to crawl a out of bed. She also had n/v. Her sxs lasted 2 hours. Pt states her sxs resolved on the way to the ER. She denies hx of vertigo.      History provided by:  Patient   used: No        Patient Care Team  Primary Care Provider: Nel Juarez APRN    Past Medical History:     Allergies   Allergen Reactions   • Bupropion Unknown - Low Severity     Didn't like the way she felt taking     Past Medical History:   Diagnosis Date   • Anemia    • Anxiety    • Depression    • Diabetes mellitus type II, controlled ([facility])     diet controlled   • Menopausal symptom 2015     Past Surgical History:   Procedure Laterality Date   • COLONOSCOPY  10/12/2020    9/11/15 2 small polyps   • DILATATION AND CURETTAGE  2011   • LASER ABLATION  2011    Uterine Ablation     Family History   Adopted: Yes       Home Medications:  Prior to Admission medications    Medication Sig Start Date End Date Taking? Authorizing Provider   methylPREDNISolone (MEDROL) 4 MG dose pack Take as directed on package instructions. 21   Nel Juarez APRN        Social History:   Social History     Tobacco Use   • Smoking status: Former     Packs/day: 1.00     Types: Cigarettes     Quit date: 2018     Years since quittin.1   • Smokeless tobacco: Never   Vaping Use   • Vaping Use: Never used   Substance Use Topics   • Alcohol use: Never   • Drug use: Never         Review of Systems:  Review of Systems   Constitutional: Negative for chills and fever.   HENT: Negative for congestion, rhinorrhea and sore throat.    Eyes:  "Negative for pain and visual disturbance.   Respiratory: Negative for apnea, cough, chest tightness and shortness of breath.    Cardiovascular: Negative for chest pain and palpitations.   Gastrointestinal: Positive for nausea and vomiting. Negative for abdominal pain and diarrhea.   Genitourinary: Negative for difficulty urinating and dysuria.   Musculoskeletal: Negative for joint swelling and myalgias.   Skin: Negative for color change.   Neurological: Positive for dizziness. Negative for seizures and headaches.   Psychiatric/Behavioral: Negative.    All other systems reviewed and are negative.       Physical Exam:  /88 (BP Location: Right arm, Patient Position: Lying)   Pulse 94   Temp 98.5 °F (36.9 °C) (Oral)   Resp 16   Ht 162.6 cm (64\")   Wt 69.5 kg (153 lb 3.5 oz)   SpO2 97%   BMI 26.30 kg/m²     Physical Exam  Vitals and nursing note reviewed.   Constitutional:       General: She is not in acute distress.     Appearance: Normal appearance. She is not toxic-appearing.   HENT:      Head: Normocephalic and atraumatic.      Jaw: There is normal jaw occlusion.   Eyes:      General: Lids are normal.      Extraocular Movements: Extraocular movements intact.      Right eye: No nystagmus.      Left eye: No nystagmus.      Conjunctiva/sclera: Conjunctivae normal.      Pupils: Pupils are equal, round, and reactive to light.   Cardiovascular:      Rate and Rhythm: Normal rate and regular rhythm.      Pulses: Normal pulses.      Heart sounds: Normal heart sounds.   Pulmonary:      Effort: Pulmonary effort is normal. No respiratory distress.      Breath sounds: Normal breath sounds. No wheezing or rhonchi.   Abdominal:      General: Abdomen is flat.      Palpations: Abdomen is soft.      Tenderness: There is no abdominal tenderness. There is no guarding or rebound.   Musculoskeletal:         General: Normal range of motion.      Cervical back: Normal range of motion and neck supple.      Right lower leg: No " edema.      Left lower leg: No edema.   Skin:     General: Skin is warm and dry.   Neurological:      Mental Status: She is alert and oriented to person, place, and time. Mental status is at baseline.   Psychiatric:         Mood and Affect: Mood normal.                  Procedures:  Procedures      Medical Decision Making:      Comorbidities that affect care:    Diabetes    External Notes reviewed:    Previous Clinic Note: Office visit      The following orders were placed and all results were independently analyzed by me:  Orders Placed This Encounter   Procedures   • XR Chest 1 View   • CT Head Without Contrast   • [place] Draw   • Comprehensive Metabolic Panel   • Troponin   • Magnesium   • CBC Auto Differential   • NPO Diet NPO Type: Strict NPO   • Undress & Gown   • Cardiac Monitoring   • Continuous Pulse Oximetry   • Vital Signs   • Orthostatic Blood Pressure   • Oxygen Therapy- Nasal Cannula; 2 LPM; Titrate for SPO2: 92%, Greater Than or Equal To   • POC Glucose Once   • ECG 12 Lead ED Triage Standing Order; Weak / Dizzy / AMS   • Insert Peripheral IV   • Fall Precautions   • CBC & Differential   • Green Top (Gel)   • Lavender Top   • Gold Top - SST   • Light Blue Top       Medications Given in the Emergency Department:  Medications   sodium chloride 0.9 % flush 10 mL (has no administration in time range)        ED Course:         Labs:    Lab Results (last 24 hours)     Procedure Component Value Units Date/Time    CBC & Differential [[id number]]  (Abnormal) Collected: 02/09/23 0558    Specimen: Blood Updated: 02/09/23 0614    Narrative:      The following orders were created for panel order CBC & Differential.  Procedure                               Abnormality         Status                     ---------                               -----------         ------                     CBC Auto Differential[[id number]]        Abnormal            Final result                 Please view results for these tests on  the individual orders.    Comprehensive Metabolic Panel [[id number]]  (Abnormal) Collected: 02/09/23 0558    Specimen: Blood Updated: 02/09/23 0633     Glucose 158 mg/dL      BUN 16 mg/dL      Creatinine 0.68 mg/dL      Sodium 140 mmol/L      Potassium 4.1 mmol/L      Chloride 102 mmol/L      CO2 26.3 mmol/L      Calcium 9.8 mg/dL      Total Protein 7.6 g/dL      Albumin 4.7 g/dL      ALT (SGPT) 29 U/L      AST (SGOT) 22 U/L      Alkaline Phosphatase 98 U/L      Total Bilirubin 0.7 mg/dL      Globulin 2.9 gm/dL      A/G Ratio 1.6 g/dL      BUN/Creatinine Ratio 23.5     Anion Gap 11.7 mmol/L      eGFR 98.0 mL/min/1.73     Narrative:      GFR Normal >60  Chronic Kidney Disease <60  Kidney Failure <15      Troponin [[id number]]  (Normal) Collected: 02/09/23 0558    Specimen: Blood Updated: 02/09/23 0633     HS Troponin T <6 ng/L     Narrative:      High Sensitive Troponin T Reference Range:  <10.0 ng/L- Negative Female for AMI  <15.0 ng/L- Negative Male for AMI  >=10 - Abnormal Female indicating possible myocardial injury.  >=15 - Abnormal Male indicating possible myocardial injury.   Clinicians would have to utilize clinical acumen, EKG, Troponin, and serial changes to determine if it is an Acute Myocardial Infarction or myocardial injury due to an underlying chronic condition.         Magnesium [[id number]]  (Normal) Collected: 02/09/23 0558    Specimen: Blood Updated: 02/09/23 0633     Magnesium 1.9 mg/dL     CBC Auto Differential [[id number]]  (Abnormal) Collected: 02/09/23 0558    Specimen: Blood Updated: 02/09/23 0614     WBC 8.71 10*3/mm3      RBC 5.34 10*6/mm3      Hemoglobin 15.4 g/dL      Hematocrit 45.8 %      MCV 85.8 fL      MCH 28.8 pg      MCHC 33.6 g/dL      RDW 12.4 %      RDW-SD 38.8 fl      MPV 9.6 fL      Platelets 258 10*3/mm3      Neutrophil % 83.9 %      Lymphocyte % 12.1 %      Monocyte % 3.1 %      Eosinophil % 0.1 %      Basophil % 0.6 %      Immature Grans % 0.2 %      Neutrophils, Absolute 7.31  10*3/mm3      Lymphocytes, Absolute 1.05 10*3/mm3      Monocytes, Absolute 0.27 10*3/mm3      Eosinophils, Absolute 0.01 10*3/mm3      Basophils, Absolute 0.05 10*3/mm3      Immature Grans, Absolute 0.02 10*3/mm3      nRBC 0.0 /100 WBC            Imaging:    CT Head Without Contrast    Result Date: 2/9/2023  PROCEDURE: CT HEAD WO CONTRAST  COMPARISON:  None INDICATIONS: DIZZINESS THIS MORNING.  NO OTHER COMPLAINTS  PROTOCOL:   Standard imaging protocol performed    RADIATION:   DLP: 1017.2 mGy*cm   MA and/or KV was adjusted to minimize radiation dose.     TECHNIQUE: After obtaining the patient's consent, CT images were obtained without non-ionic intravenous contrast material.  FINDINGS:  There is no acute intracranial hemorrhage or extra-axial collection. The ventricles appear normal in caliber, with no evidence of mass effect or midline shift. The basal cisterns are patent. The gray-white differentiation is preserved.  The calvarium is intact. The paranasal sinuses and mastoid air cells are well-aerated.       No acute intracranial process identified.     ALONZO DAVIS MD       Electronically Signed and Approved By: ALONZO DAVIS MD on 2/09/2023 at 6:58             XR Chest 1 View    Result Date: 2/9/2023  PROCEDURE: XR CHEST 1 VW  COMPARISON: [facility], , CHEST AP/PA 1 VIEW, 3/14/2019, 10:31.  INDICATIONS: Weak/Dizzy/AMS triage protocol  FINDINGS:  LUNGS: Normal.  No significant pulmonary parenchymal abnormalities.  VASCULATURE: Normal.  Unremarkable pulmonary vasculature.  CARDIAC: Normal.  No cardiac silhouette abnormality or cardiomegaly.  MEDIASTINUM: Normal.  No visible mass or adenopathy.  PLEURA: Normal.  No effusion or pleural thickening.  BONES: Normal.  No fracture or visible bony lesion.  OTHER: Negative.        Normal examination.        ALONZO DAVIS MD       Electronically Signed and Approved By: ALONZO DAVIS MD on 2/09/2023 at 6:15                 Differential Diagnosis  and Discussion:    Dizziness: Based on the patient's history, signs, and symptoms, the diffential diagnosis includes but is not limited to meningitis, stroke, sepsis, subarachnoid hemorrhage, intracranial bleeding, encephalitis, vertigo, electrolyte imbalance, and metabolic disorders.    All labs were reviewed and interpreted by me.  All X-rays were independently reviewed by me.  EKG was interpreted by me.  CT scan radiology interpretation was reviewed by me.    MDM  Number of Diagnoses or Management Options  Vertigo  Diagnosis management comments: In summary this is a 62-year-old female who presents to the emergency department for evaluation of sudden onset of vertigo upon waking this morning.  She reports she woke up in the [place] to go to the restroom and was so dizzy she had to crawl to the restroom.  After resting for a couple of hours she is course of the symptoms have resolved.  In the emergency department she is ambulated to the restroom without difficulty or assistance.  CT the brain is unremarkable CBC independently reviewed by me and shows no critical abnormalities.  CMP independently reviewed by me and shows no critical abnormalities.  Chest x-ray unremarkable.  High-sensitivity troponin normal.  Patient has a normal physical examination at this time as well.  Her symptoms and presentation are consistent with BPPV and she will be prescribed meclizine.  She is also been instructed to follow-up as needed with physical therapy.       Amount and/or Complexity of Data Reviewed  Clinical lab tests: reviewed  Tests in the radiology section of CPT®: reviewed  Tests in the medicine section of CPT®: reviewed             Patient Care Considerations:    MRI: I considered ordering an MRI however Patient's CT brain was unremarkable and her symptoms have resolved.      Consultants/Shared Management Plan:    None    Social Determinants of Health:    Patient is independent, reliable, and has access to care.        Disposition and Care Coordination:    Discharged: The patient is suitable and stable for discharge with no need for consideration of observation or admission.    I have explained the patient´s condition, diagnoses and treatment plan based on the information available to me at this time. I have answered questions and addressed any concerns. The patient has a good  understanding of the patient´s diagnosis, condition, and treatment plan as can be expected at this point. The vital signs have been stable. The patient´s condition is stable and appropriate for discharge from the emergency department.      The patient will pursue further outpatient evaluation with the primary care physician or other designated or consulting physician as outlined in the discharge instructions. They are agreeable to this plan of care and follow-up instructions have been explained in detail. The patient has received these instructions in written format and have expressed an understanding of the discharge instructions. The patient is aware that any significant change in condition or worsening of symptoms should prompt an immediate return to this or the closest emergency department or call to 911.  I have explained discharge medications and the need for follow up with the patient/caretakers. This was also printed in the discharge instructions. Patient was discharged with the following medications and follow up:      Medication List      New Prescriptions    meclizine 25 MG tablet  Commonly known as: ANTIVERT  Take 1 tablet by mouth 3 (Three) Times a Day As Needed for Dizziness.           Where to Get Your Medications      These medications were sent to [facility] [employer] [id number] - [place] - [address] AT [facility] OF [address] /[facility] & [place] - [phone] [facility] [phone]   [address] [place] [address]    Phone: [phone]   · meclizine 25 MG tablet      Nel Juarez APRN  [address]  [place]  [place] [address]  [phone]    In 1 week         Final diagnoses:   Vertigo        ED Disposition     ED Disposition   Discharge    Condition   Stable    Comment   --             This medical record created using voice recognition software.    Documentation assistance provided by Idris Fleming acting as scribe for No att. providers found. Information recorded by the scribe was done at my direction and has been verified and validated by me.          Idris Fleming  02/09/23 0659       Jaya Davis MD  02/09/23 0749

## 2023-02-09 NOTE — ED TRIAGE NOTES
"Pt to ED from home with reports of waking up at 0130 \"and the whole room was spinning.  I couldn't even walk, I had to crawl to go to the bathroom.  I lay down for awhile and it gets better and then I stand up and it gets better\".      Pt states symptoms seem to be resolved upon arrival to ED.  " Denied   Psychiatry to refill  MyChart sent to pt  Aria DEL TORO RN

## 2023-02-15 LAB — QT INTERVAL: 355 MS

## 2023-04-25 ENCOUNTER — TRANSCRIBE ORDERS (OUTPATIENT)
Dept: ADMINISTRATIVE | Facility: HOSPITAL | Age: 63
End: 2023-04-25
Payer: COMMERCIAL

## 2023-04-25 DIAGNOSIS — Z12.31 SCREENING MAMMOGRAM, ENCOUNTER FOR: Primary | ICD-10-CM

## 2023-07-26 ENCOUNTER — OFFICE VISIT (OUTPATIENT)
Dept: FAMILY MEDICINE CLINIC | Facility: CLINIC | Age: 63
End: 2023-07-26
Payer: COMMERCIAL

## 2023-07-26 VITALS
OXYGEN SATURATION: 99 % | HEART RATE: 101 BPM | DIASTOLIC BLOOD PRESSURE: 100 MMHG | TEMPERATURE: 99.5 F | SYSTOLIC BLOOD PRESSURE: 171 MMHG | BODY MASS INDEX: 26.23 KG/M2 | WEIGHT: 152.8 LBS

## 2023-07-26 DIAGNOSIS — F41.0 PANIC ATTACKS: Primary | ICD-10-CM

## 2023-07-26 DIAGNOSIS — Z11.59 NEED FOR HEPATITIS C SCREENING TEST: ICD-10-CM

## 2023-07-26 DIAGNOSIS — Z79.899 LONG-TERM USE OF HIGH-RISK MEDICATION: ICD-10-CM

## 2023-07-26 LAB
ALBUMIN SERPL-MCNC: 4.5 G/DL (ref 3.5–5.2)
ALBUMIN/GLOB SERPL: 1.7 G/DL
ALP SERPL-CCNC: 86 U/L (ref 39–117)
ALT SERPL W P-5'-P-CCNC: 27 U/L (ref 1–33)
AMPHET+METHAMPHET UR QL: NEGATIVE
AMPHETAMINE INTERNAL CONTROL: ABNORMAL
AMPHETAMINES UR QL: NEGATIVE
ANION GAP SERPL CALCULATED.3IONS-SCNC: 13.3 MMOL/L (ref 5–15)
AST SERPL-CCNC: 22 U/L (ref 1–32)
BARBITURATE INTERNAL CONTROL: ABNORMAL
BARBITURATES UR QL SCN: NEGATIVE
BENZODIAZ UR QL SCN: NEGATIVE
BENZODIAZEPINE INTERNAL CONTROL: ABNORMAL
BILIRUB SERPL-MCNC: 0.8 MG/DL (ref 0–1.2)
BUN SERPL-MCNC: 12 MG/DL (ref 8–23)
BUN/CREAT SERPL: 15.6 (ref 7–25)
BUPRENORPHINE INTERNAL CONTROL: ABNORMAL
BUPRENORPHINE SERPL-MCNC: NEGATIVE NG/ML
CALCIUM SPEC-SCNC: 9.3 MG/DL (ref 8.6–10.5)
CANNABINOIDS SERPL QL: POSITIVE
CHLORIDE SERPL-SCNC: 103 MMOL/L (ref 98–107)
CO2 SERPL-SCNC: 22.7 MMOL/L (ref 22–29)
COCAINE INTERNAL CONTROL: ABNORMAL
COCAINE UR QL: NEGATIVE
CREAT SERPL-MCNC: 0.77 MG/DL (ref 0.57–1)
DEPRECATED RDW RBC AUTO: 38.1 FL (ref 37–54)
EGFRCR SERPLBLD CKD-EPI 2021: 86.8 ML/MIN/1.73
ERYTHROCYTE [DISTWIDTH] IN BLOOD BY AUTOMATED COUNT: 12.6 % (ref 12.3–15.4)
EXPIRATION DATE: ABNORMAL
GLOBULIN UR ELPH-MCNC: 2.6 GM/DL
GLUCOSE SERPL-MCNC: 138 MG/DL (ref 65–99)
HCT VFR BLD AUTO: 45.5 % (ref 34–46.6)
HCV AB SER DONR QL: NORMAL
HGB BLD-MCNC: 15.4 G/DL (ref 12–15.9)
Lab: ABNORMAL
MCH RBC QN AUTO: 28.7 PG (ref 26.6–33)
MCHC RBC AUTO-ENTMCNC: 33.8 G/DL (ref 31.5–35.7)
MCV RBC AUTO: 84.9 FL (ref 79–97)
MDMA (ECSTASY) INTERNAL CONTROL: ABNORMAL
MDMA UR QL SCN: NEGATIVE
METHADONE INTERNAL CONTROL: ABNORMAL
METHADONE UR QL SCN: NEGATIVE
METHAMPHETAMINE INTERNAL CONTROL: ABNORMAL
OPIATES INTERNAL CONTROL: ABNORMAL
OPIATES UR QL: NEGATIVE
OXYCODONE INTERNAL CONTROL: ABNORMAL
OXYCODONE UR QL SCN: NEGATIVE
PCP UR QL SCN: NEGATIVE
PHENCYCLIDINE INTERNAL CONTROL: ABNORMAL
PLATELET # BLD AUTO: 298 10*3/MM3 (ref 140–450)
PMV BLD AUTO: 10 FL (ref 6–12)
POTASSIUM SERPL-SCNC: 3.5 MMOL/L (ref 3.5–5.2)
PROT SERPL-MCNC: 7.1 G/DL (ref 6–8.5)
RBC # BLD AUTO: 5.36 10*6/MM3 (ref 3.77–5.28)
SODIUM SERPL-SCNC: 139 MMOL/L (ref 136–145)
THC INTERNAL CONTROL: ABNORMAL
TSH SERPL DL<=0.05 MIU/L-ACNC: 0.91 UIU/ML (ref 0.27–4.2)
WBC NRBC COR # BLD: 8.79 10*3/MM3 (ref 3.4–10.8)

## 2023-07-26 PROCEDURE — 80050 GENERAL HEALTH PANEL: CPT | Performed by: NURSE PRACTITIONER

## 2023-07-26 PROCEDURE — 86803 HEPATITIS C AB TEST: CPT | Performed by: NURSE PRACTITIONER

## 2023-07-26 RX ORDER — CLONAZEPAM 0.5 MG/1
0.5 TABLET ORAL 2 TIMES DAILY PRN
Qty: 30 TABLET | Refills: 0 | Status: SHIPPED | OUTPATIENT
Start: 2023-07-26

## 2023-07-26 NOTE — LETTER
July 26, 2023     Patient: Divina Virk   YOB: 1960   Date of Visit: 7/26/2023       To Whom It May Concern:    It is my medical opinion that Divina Virk should remain out of work until 8/2/2023 .           Sincerely,        NADINE Batista

## 2023-07-26 NOTE — PROGRESS NOTES
Chief Complaint  Hypertension (For a few weeks/ unsure if that is the problem)    Subjective          Divina Virk is a 62 y.o. female who presents to [facility]    History of Present Illness    Pt was treated for tick bite with doxycycline and reports she had near syncope. Pt is having dizziness, and is having trouble with speaking full sentences.     Elevated blood pressure today with anxiety.            Review of Systems   Constitutional:  Negative for chills, fatigue and fever.   Respiratory:  Positive for shortness of breath. Negative for cough.    Cardiovascular:  Negative for chest pain and palpitations.   Gastrointestinal:  Negative for constipation, diarrhea, nausea and vomiting.   Musculoskeletal:  Negative for back pain and neck pain.   Skin:  Negative for rash.   Neurological:  Positive for numbness. Negative for dizziness and headaches.   Psychiatric/Behavioral:  Positive for sleep disturbance. Negative for self-injury and suicidal ideas. The patient is nervous/anxious.         Medical History: has a past medical history of Anemia, Anxiety, Depression, Diabetes mellitus type II, controlled, and Menopausal symptom (09/14/2015).     Surgical History: has a past surgical history that includes Dilation and curettage of uterus (11/01/2011); Laser ablation (11/01/2011); and Colonoscopy (10/12/2020).     Family History: family history is not on file. She was adopted.     Social History: reports that she quit smoking about 4 years ago. Her smoking use included cigarettes. She smoked an average of 1 pack per day. She has never used smokeless tobacco. She reports that she does not drink alcohol and does not use drugs.    Allergies: Bupropion      Health Maintenance Due   Topic Date Due    URINE MICROALBUMIN  Never done    TDAP/TD VACCINES (1 - Tdap) Never done    Pneumococcal Vaccine 0-64 (2 - PPSV23 or PCV20) 12/04/2020    HEPATITIS C SCREENING  Never done    DIABETIC FOOT  EXAM  Never done    PAP SMEAR  06/24/2021    HEMOGLOBIN A1C  06/24/2021    DIABETIC EYE EXAM  Never done    COVID-19 Vaccine (3 - Pfizer series) 09/15/2021    ANNUAL PHYSICAL  08/04/2022    ZOSTER VACCINE (2 of 2) 03/07/2023            Current Outpatient Medications:     clobetasol (TEMOVATE) 0.05 % gel, Apply 1 application  topically to the appropriate area as directed Every 12 (Twelve) Hours. (Patient not taking: Reported on 7/26/2023), Disp: 30 g, Rfl: 1    clonazePAM (KlonoPIN) 0.5 MG tablet, Take 1 tablet by mouth 2 (Two) Times a Day As Needed for Anxiety., Disp: 30 tablet, Rfl: 0    meclizine (ANTIVERT) 25 MG tablet, Take 1 tablet by mouth 3 (Three) Times a Day As Needed for Dizziness. (Patient not taking: Reported on 7/5/2023), Disp: 30 tablet, Rfl: 0      Immunization History   Administered Date(s) Administered    COVID-19 (PFIZER) Purple Cap Monovalent 06/24/2021, 07/21/2021    Fluzone Quad >6mos (Multi-dose) 10/09/2020    Pneumococcal Conjugate 13-Valent (PCV13) 10/09/2020         Objective       Vitals:    07/26/23 1006 07/26/23 1009 07/26/23 1037   BP: (!) 189/86 (!) 170/101 171/100   Pulse: 101     Temp: 99.5 °F (37.5 °C)     TempSrc: Temporal     SpO2: 99%     Weight: 69.3 kg (152 lb 12.8 oz)        Body mass index is 26.23 kg/m².   Wt Readings from Last 3 Encounters:   07/26/23 69.3 kg (152 lb 12.8 oz)   02/09/23 69.5 kg (153 lb 3.5 oz)   08/04/21 66.8 kg (147 lb 3.2 oz)      BP Readings from Last 3 Encounters:   07/26/23 171/100   02/09/23 152/88   09/08/21 125/69            Physical Exam  Vitals reviewed.   Constitutional:       General: She is in acute distress.      Appearance: She is well-developed. She is not ill-appearing.   HENT:      Head: Normocephalic and atraumatic.   Eyes:      Conjunctiva/sclera: Conjunctivae normal.      Pupils: Pupils are equal, round, and reactive to light.   Cardiovascular:      Rate and Rhythm: Normal rate and regular rhythm.      Heart sounds: Normal heart sounds.  No murmur heard.  Pulmonary:      Effort: Pulmonary effort is normal.      Breath sounds: Normal breath sounds. No wheezing or rhonchi.   Abdominal:      General: Bowel sounds are normal. There is no distension.      Palpations: Abdomen is soft.      Tenderness: There is no abdominal tenderness.   Skin:     General: Skin is warm and dry.   Neurological:      Mental Status: She is alert and oriented to person, place, and time.   Psychiatric:         Mood and Affect: Mood is anxious. Affect is flat.         Behavior: Behavior normal.         Thought Content: Thought content normal. Thought content is not delusional. Thought content does not include homicidal or suicidal ideation. Thought content does not include homicidal or suicidal plan.         Judgment: Judgment normal.       Pt is not able to speak full sentences and is hyperventilating.     Result Review :       Common labs          2/9/2023    05:58   Common Labs   Glucose 158    BUN 16    Creatinine 0.68    Sodium 140    Potassium 4.1    Chloride 102    Calcium 9.8    Albumin 4.7    Total Bilirubin 0.7    Alkaline Phosphatase 98    AST (SGOT) 22    ALT (SGPT) 29    WBC 8.71    Hemoglobin 15.4    Hematocrit 45.8    Platelets 258                       Assessment and Plan        Diagnoses and all orders for this visit:    1. Panic attacks (Primary)  -     TSH  -     CBC (No Diff)  -     Comprehensive metabolic panel  -     clonazePAM (KlonoPIN) 0.5 MG tablet; Take 1 tablet by mouth 2 (Two) Times a Day As Needed for Anxiety.  Dispense: 30 tablet; Refill: 0    2. Long-term use of high-risk medication  -     POC Urine Drug Screen [facility]    3. Need for hepatitis C screening test  -     Hepatitis C antibody; Future  -     Hepatitis C antibody      Pt was given breathing instructions and advised to monitor her posturing which is causing neck pain.     Elevated blood pressure is most likely related to panic attack. Pt will take medication as directed and  monitor blood pressure at home.     Patient advised to monitor blood pressure at home and journal readings.  Patient informed that a blood pressure reading at home of more than 130/80 is considered hypertension.  For readings greater than 140/90 or higher patient is advised to follow-up in the office with readings for management.  Patient advised to limit sodium intake.      Follow Up     Return in about 1 week (around 8/2/2023) for Next scheduled follow up.    Patient was given instructions and counseling regarding her condition or for health maintenance advice. Please see specific information pulled into the AVS if appropriate.     NADINE Batista

## 2023-08-02 ENCOUNTER — OFFICE VISIT (OUTPATIENT)
Dept: FAMILY MEDICINE CLINIC | Facility: CLINIC | Age: 63
End: 2023-08-02
Payer: COMMERCIAL

## 2023-08-02 VITALS
SYSTOLIC BLOOD PRESSURE: 136 MMHG | TEMPERATURE: 98.6 F | HEART RATE: 93 BPM | DIASTOLIC BLOOD PRESSURE: 89 MMHG | OXYGEN SATURATION: 100 % | BODY MASS INDEX: 26.37 KG/M2 | WEIGHT: 153.6 LBS

## 2023-08-02 DIAGNOSIS — Z13.220 LIPID SCREENING: ICD-10-CM

## 2023-08-02 DIAGNOSIS — F41.0 PANIC ATTACKS: ICD-10-CM

## 2023-08-02 DIAGNOSIS — M25.552 PAIN OF LEFT HIP: ICD-10-CM

## 2023-08-02 DIAGNOSIS — F41.8 ANXIETY ABOUT HEALTH: ICD-10-CM

## 2023-08-02 DIAGNOSIS — H60.502 ACUTE OTITIS EXTERNA OF LEFT EAR, UNSPECIFIED TYPE: Primary | ICD-10-CM

## 2023-08-02 DIAGNOSIS — L23.7 POISON IVY: ICD-10-CM

## 2023-08-02 RX ORDER — ESCITALOPRAM OXALATE 5 MG/1
5 TABLET ORAL DAILY
Qty: 30 TABLET | Refills: 1 | Status: SHIPPED | OUTPATIENT
Start: 2023-08-02

## 2023-08-02 RX ORDER — OFLOXACIN 3 MG/ML
5 SOLUTION AURICULAR (OTIC) DAILY
Qty: 10 ML | Refills: 0 | Status: SHIPPED | OUTPATIENT
Start: 2023-08-02

## 2023-08-15 NOTE — PROGRESS NOTES
Well Woman Visit    CC: Scheduled annual well gyn visit  Chief Complaint   Patient presents with    Gynecologic Exam           HPI:   62 y.o. who presents today for annual exam. Patient states periods stopped years ago.   She denies any pain with intercourse. She denies any family H/O breast, uterine or ovarian cancer. She denies any vaginal odor, vaginal discharge, dysuria/hematuria, F/C, D/C, N/V, CP or SOB. She denies any difficulties with urination or incontinence issues.    History: PMHx, Meds, Allergies, PSHx, Social Hx, and POBHx all reviewed and updated.    ROS:  General ROS: negative for chills or fatigue  Ophthalmic ROS: negative for blurry vision or decreased vision  ENT ROS: negative for epistaxis, headaches or hearing change  Hematological and Lymphatic ROS: negative for bleeding problems or blood clots  Endocrine ROS: negative for breast changes or galactorrhea  Breast ROS: negative for galactorrhea or new or changing breast lumps  Respiratory ROS: negative for cough or hemoptysis  Cardiovascular ROS: negative for chest pain or dyspnea on exertion  Gastrointestinal ROS: negative for abdominal pain or appetite loss  Genito-Urinary ROS: negative for difficulty in urination or vaginal irritation   Musculoskeletal ROS: negative for gait disturbance or joint pain  Neurological ROS: negative for behavioral changes or bowel and bladder control changes  Dermatological ROS: negative for rash  Psych ROS: negative for depression      PHYSICAL EXAM:      /87   Pulse 84   Temp 98.2 °F (36.8 °C) (Temporal)   Wt 71.6 kg (157 lb 12.8 oz)   SpO2 97%   BMI 27.09 kg/m²  Not found.    BMI is >= 25 and <30. (Overweight) The following options were offered after discussion;: exercise counseling/recommendations and nutrition counseling/recommendations     General- NAD, alert and oriented, appropriate  Psych- Normal mood, good memory  Neck- No masses, no thyroid enlargement  CV- Regular rhythm, no murnurs  Resp-  CTA to bases, no wheezes  Abdomen- Soft, non distended, non tender, no masses    Breast Exam:  Breast self awareness counseled  Breast left-  Bilaterally symmetrical, no masses, non tender, no nipple discharge  Breast right- Bilaterally symmetrical, no masses, non tender, no nipple discharge    Pelvic Exam:   External genitalia- Normal female, no lesions  Urethra/meatus- Normal, no masses, non tender  Bladder- Normal, no masses, non tender  Vagina- Normal, no atrophy, no lesions, no discharge.  Prolapse : none noted, not examined with split speculum to delineate  Cvx- Normal, no lesions, no discharge, No cervical motion tenderness  Uterus- Normal size, shape & consistency.  Non tender, mobile, & no prolapse  Adnexa- No mass, non tender  Anus/Rectum/Perineum- Normal appearance, no mass, good sphincter tone, no hemorrhoids, no prolapse    Lymphatic- No palpable neck, axillary, or groin nodes  Ext- No edema, no cyanosis    Skin- No lesions, no rashes, no acanthosis nigricans      ASSESSMENT and PLAN:    Diagnoses and all orders for this visit:    1. Encounter for routine gynecological examination with Papanicolaou smear of cervix (Primary)  -     Gardnerella vaginalis, Trichomonas vaginalis, Candida albicans, DNA - Swab, Vagina  -     IgP, Aptima HPV    2. Post-menopausal  -     DEXA Bone Density Axial; Future    3. Panic attacks  -     escitalopram (Lexapro) 5 MG tablet; Take 1 tablet by mouth Daily.  Dispense: 90 tablet; Refill: 1    4. Anxiety about health  -     escitalopram (Lexapro) 5 MG tablet; Take 1 tablet by mouth Daily.  Dispense: 90 tablet; Refill: 1    5. Elevated blood pressure reading in office without diagnosis of hypertension    Patient advised to monitor blood pressure at home and journal readings.  Patient informed that a blood pressure reading at home of more than 130/80 is considered hypertension.  For readings greater than 140/90 or higher patient is advised to follow-up in the office with  readings for management.  Patient advised to limit sodium intake.      Preventative:  1. Annuals every year; Cytology collections per prevailing guidelines.   2. Mammograms begin every year at 41 yo if no abnormalities are found and no family history.  3. Bone density studies begin at 64 yo. If no fracture history or osteoporosis family history.  4. Colonoscopy begins at 44 yo. Repeat every ten years if negative and no family history.  5. Calcium of 4248-1096 mg/day in split dosing  6. Vitamin D 400-800 IU/day      She understands the importance of having any ordered tests to be performed in a timely fashion.  The risks of not performing them include, but are not limited to, advanced cancer stages, bone loss from osteoporosis and/or subsequent increase in morbidity and/or mortality.  She is encouraged to review her results online and/or contact or office if she has questions.     Follow Up:  Return in about 6 months (around 3/7/2024) for Next scheduled follow up.        NADINE Batista

## 2023-08-24 DIAGNOSIS — F41.0 PANIC ATTACKS: ICD-10-CM

## 2023-08-24 RX ORDER — CLONAZEPAM 0.5 MG/1
0.5 TABLET ORAL 2 TIMES DAILY PRN
Qty: 30 TABLET | Refills: 0 | Status: SHIPPED | OUTPATIENT
Start: 2023-08-24

## 2023-09-07 ENCOUNTER — OFFICE VISIT (OUTPATIENT)
Dept: FAMILY MEDICINE CLINIC | Facility: CLINIC | Age: 63
End: 2023-09-07
Payer: COMMERCIAL

## 2023-09-07 VITALS
HEART RATE: 84 BPM | WEIGHT: 157.8 LBS | OXYGEN SATURATION: 97 % | TEMPERATURE: 98.2 F | SYSTOLIC BLOOD PRESSURE: 160 MMHG | DIASTOLIC BLOOD PRESSURE: 87 MMHG | BODY MASS INDEX: 27.09 KG/M2

## 2023-09-07 DIAGNOSIS — F41.8 ANXIETY ABOUT HEALTH: ICD-10-CM

## 2023-09-07 DIAGNOSIS — Z78.0 POST-MENOPAUSAL: ICD-10-CM

## 2023-09-07 DIAGNOSIS — F41.0 PANIC ATTACKS: ICD-10-CM

## 2023-09-07 DIAGNOSIS — R03.0 ELEVATED BLOOD PRESSURE READING IN OFFICE WITHOUT DIAGNOSIS OF HYPERTENSION: ICD-10-CM

## 2023-09-07 DIAGNOSIS — Z01.419 ENCOUNTER FOR ROUTINE GYNECOLOGICAL EXAMINATION WITH PAPANICOLAOU SMEAR OF CERVIX: Primary | ICD-10-CM

## 2023-09-07 LAB
CANDIDA SPECIES: NEGATIVE
GARDNERELLA VAGINALIS: NEGATIVE
T VAGINALIS DNA VAG QL PROBE+SIG AMP: NEGATIVE

## 2023-09-07 PROCEDURE — 87510 GARDNER VAG DNA DIR PROBE: CPT | Performed by: NURSE PRACTITIONER

## 2023-09-07 PROCEDURE — G0123 SCREEN CERV/VAG THIN LAYER: HCPCS | Performed by: NURSE PRACTITIONER

## 2023-09-07 PROCEDURE — 87660 TRICHOMONAS VAGIN DIR PROBE: CPT | Performed by: NURSE PRACTITIONER

## 2023-09-07 PROCEDURE — 99396 PREV VISIT EST AGE 40-64: CPT | Performed by: NURSE PRACTITIONER

## 2023-09-07 PROCEDURE — 87624 HPV HI-RISK TYP POOLED RSLT: CPT | Performed by: NURSE PRACTITIONER

## 2023-09-07 PROCEDURE — 87480 CANDIDA DNA DIR PROBE: CPT | Performed by: NURSE PRACTITIONER

## 2023-09-07 RX ORDER — ESCITALOPRAM OXALATE 5 MG/1
5 TABLET ORAL DAILY
Qty: 90 TABLET | Refills: 1 | Status: SHIPPED | OUTPATIENT
Start: 2023-09-07

## 2023-09-11 LAB
CYTOLOGIST CVX/VAG CYTO: NORMAL
CYTOLOGY CVX/VAG DOC CYTO: NORMAL
CYTOLOGY CVX/VAG DOC THIN PREP: NORMAL
DX ICD CODE: NORMAL
HIV 1 & 2 AB SER-IMP: NORMAL
HPV I/H RISK 4 DNA CVX QL PROBE+SIG AMP: NEGATIVE
OTHER STN SPEC: NORMAL
STAT OF ADQ CVX/VAG CYTO-IMP: NORMAL

## 2023-10-10 ENCOUNTER — HOSPITAL ENCOUNTER (OUTPATIENT)
Dept: BONE DENSITY | Facility: HOSPITAL | Age: 63
Discharge: HOME | End: 2023-10-10
Admitting: NURSE PRACTITIONER
Payer: COMMERCIAL

## 2023-10-10 DIAGNOSIS — Z78.0 POST-MENOPAUSAL: ICD-10-CM

## 2023-10-10 PROCEDURE — 77080 DXA BONE DENSITY AXIAL: CPT

## 2023-10-27 ENCOUNTER — CLINICAL SUPPORT (OUTPATIENT)
Dept: FAMILY MEDICINE CLINIC | Facility: CLINIC | Age: 63
End: 2023-10-27
Payer: COMMERCIAL

## 2023-10-27 DIAGNOSIS — Z13.220 LIPID SCREENING: ICD-10-CM

## 2023-10-27 LAB
CHOLEST SERPL-MCNC: 226 MG/DL (ref 0–200)
HDLC SERPL-MCNC: 61 MG/DL (ref 40–60)
LDLC SERPL CALC-MCNC: 105 MG/DL (ref 0–100)
LDLC/HDLC SERPL: 1.53 {RATIO}
TRIGL SERPL-MCNC: 358 MG/DL (ref 0–150)
VLDLC SERPL-MCNC: 60 MG/DL (ref 5–40)

## 2023-10-27 PROCEDURE — 80061 LIPID PANEL: CPT | Performed by: NURSE PRACTITIONER

## 2023-12-27 NOTE — PROGRESS NOTES
Chief Complaint    Annual Exam  Skin Problem (Has some skin tags she would like removed), Annual Exam, and Diabetes (Follow up)    Subjective          Divina Virk is a 63 y.o. female who presents to [facility]    History of Present Illness    Annual Exam    Last dental exam: every 6 months.   Last eye exam: nov 2023.    Anxiety - improved with taking Lexapro daily. Pt takes clonazepam intermittently during stressful situations. Clonazepam refilled 8.24.2023.      Patient has been erroneously marked as diabetic. Based on the available clinical information, she does not have diabetes and should therefore be excluded from diabetic health maintenance and quality measures for the remainder of the reporting period.      PHQ-2 Total Score: 0   PHQ-9 Total Score: 0        Review of Systems   Constitutional:  Negative for chills, fatigue and fever.   Respiratory:  Negative for cough and shortness of breath.    Cardiovascular:  Negative for chest pain and palpitations.   Gastrointestinal:  Negative for constipation, diarrhea, nausea and vomiting.   Musculoskeletal:  Negative for arthralgias, back pain and neck pain.   Skin:  Positive for rash.   Neurological:  Negative for dizziness and headaches.   Psychiatric/Behavioral:  Positive for sleep disturbance. The patient is nervous/anxious.           Medical History: has a past medical history of Anemia, Anxiety, Depression, Diabetes mellitus type II, controlled, and Menopausal symptom (09/14/2015).     Surgical History: has a past surgical history that includes Dilation and curettage of uterus (11/01/2011); Laser ablation (11/01/2011); and Colonoscopy (10/12/2020).     Family History: family history is not on file. She was adopted.     Social History: reports that she quit smoking about 5 years ago. Her smoking use included cigarettes. She started smoking about 40 years ago. She has a 35 pack-year smoking history. She has never used  smokeless tobacco. She reports that she does not drink alcohol and does not use drugs.    Allergies: Adhesive tape and Bupropion      Health Maintenance Due   Topic Date Due    URINE MICROALBUMIN  Never done    TDAP/TD VACCINES (1 - Tdap) Never done    LUNG CANCER SCREENING  Never done    RSV Vaccine - Adults (1 - 1-dose 60+ series) Never done    Pneumococcal Vaccine 0-64 (2 of 2 - PPSV23 or PCV20) 12/04/2020    DIABETIC FOOT EXAM  Never done    HEMOGLOBIN A1C  06/24/2021    DIABETIC EYE EXAM  Never done    ANNUAL PHYSICAL  08/04/2022    ZOSTER VACCINE (2 of 2) 03/07/2023    INFLUENZA VACCINE  08/01/2023    COVID-19 Vaccine (3 - 2023-24 season) 09/01/2023            Current Outpatient Medications:     clonazePAM (KlonoPIN) 0.5 MG tablet, Take 1 tablet by mouth 2 (Two) Times a Day As Needed for Anxiety., Disp: 30 tablet, Rfl: 0    COLLAGEN PO, Take  by mouth., Disp: , Rfl:     escitalopram (Lexapro) 5 MG tablet, Take 1 tablet by mouth Daily., Disp: 90 tablet, Rfl: 1    clobetasol (TEMOVATE) 0.05 % gel, Apply 1 application  topically to the appropriate area as directed Every 12 (Twelve) Hours. (Patient not taking: Reported on 9/7/2023), Disp: 30 g, Rfl: 1    meclizine (ANTIVERT) 25 MG tablet, Take 1 tablet by mouth 3 (Three) Times a Day As Needed for Dizziness. (Patient not taking: Reported on 7/5/2023), Disp: 30 tablet, Rfl: 0    triamcinolone (KENALOG) 0.1 % ointment, Apply 1 application  topically to the appropriate area as directed 2 (Two) Times a Day. (Patient not taking: Reported on 9/7/2023), Disp: 30 g, Rfl: 1      Immunization History   Administered Date(s) Administered    COVID-19 (PFIZER) Purple Cap Monovalent 06/24/2021, 07/21/2021    Fluzone Quad >6mos (Multi-dose) 10/09/2020    Influenza Injectable Mdck Pf Quad 01/10/2023    Pneumococcal Conjugate 13-Valent (PCV13) 10/09/2020    Shingrix 01/10/2023         Objective       Vitals:    03/07/24 1320   BP: 137/88   Pulse: 106   Temp: 99.2 °F (37.3 °C)  "  [facility]   SpO2: 96%   Weight: 71.2 kg (157 lb)   Height: 162.6 cm (64.02\")      Body mass index is 26.94 kg/m².   Wt Readings from Last 3 Encounters:   03/07/24 71.2 kg (157 lb)   09/07/23 71.6 kg (157 lb 12.8 oz)   08/02/23 69.7 kg (153 lb 9.6 oz)      BP Readings from Last 3 Encounters:   03/07/24 137/88   09/07/23 160/87   08/02/23 136/89                 Physical Exam  Vitals reviewed.   Constitutional:       Appearance: Normal appearance. She is well-developed.   HENT:      Head: Normocephalic and atraumatic.   Eyes:      Conjunctiva/sclera: Conjunctivae normal.      Pupils: Pupils are equal, round, and reactive to light.   Cardiovascular:      Rate and Rhythm: Normal rate and regular rhythm.      Heart sounds: Normal heart sounds. No murmur heard.  Pulmonary:      Effort: Pulmonary effort is normal.      Breath sounds: Normal breath sounds. No wheezing or rhonchi.   Abdominal:      General: Bowel sounds are normal. There is no distension.      Palpations: Abdomen is soft.      Tenderness: There is no abdominal tenderness.   Skin:     General: Skin is warm and dry.   Neurological:      Mental Status: She is alert and oriented to person, place, and time.   Psychiatric:         Mood and Affect: Mood and affect normal.         Behavior: Behavior normal.         Thought Content: Thought content normal.         Judgment: Judgment normal.           Result Review :       Common labs          7/26/2023    10:43 10/27/2023    11:29   Common Labs   Glucose 138     BUN 12     Creatinine 0.77     Sodium 139     Potassium 3.5     Chloride 103     Calcium 9.3     Albumin 4.5     Total Bilirubin 0.8     Alkaline Phosphatase 86     AST (SGOT) 22     ALT (SGPT) 27     WBC 8.79     Hemoglobin 15.4     Hematocrit 45.5     Platelets 298     Total Cholesterol  226    Triglycerides  358    HDL Cholesterol  61    LDL Cholesterol   105            Cryotherapy, Skin Lesion    Date/Time: 3/7/2024 1:45 PM    Performed by: " Nel Juarez APRN  Authorized by: Nel Juarez APRN  Preparation: Patient was prepped and draped in the usual sterile fashion.  Local anesthesia used: no    Anesthesia:  Local anesthesia used: no    Sedation:  Patient sedated: no    Patient tolerance: patient tolerated the procedure well with no immediate complications  Comments: 3 skin tags on left and 3 skin tags on right lateral neck treated with 3, 5 second freeze thaw cycle.               Assessment and Plan        Diagnoses and all orders for this visit:    1. Annual physical exam (Primary)  -     CBC (No Diff)    2. Screening for lipid disorders  -     Comprehensive Metabolic Panel  -     Lipid Panel    3. Screening for thyroid disorder  -     TSH    4. Panic attacks  -     clonazePAM (KlonoPIN) 0.5 MG tablet; Take 1 tablet by mouth 2 (Two) Times a Day As Needed for Anxiety.  Dispense: 30 tablet; Refill: 0  -     escitalopram (Lexapro) 5 MG tablet; Take 1 tablet by mouth Daily.  Dispense: 90 tablet; Refill: 1    5. Skin tags, multiple acquired  Comments:  on neck.  Orders:  -     Cryotherapy, Skin Lesion    6. Anxiety about health  -     escitalopram (Lexapro) 5 MG tablet; Take 1 tablet by mouth Daily.  Dispense: 90 tablet; Refill: 1    7. Impaired fasting glucose  -     Hemoglobin A1c    8. Vitamin D deficiency  -     Vitamin D,25-Hydroxy      Obtained a written consent for JENNA query. Discussed the risks and benefits of the use of controlled substances with the patient, including the risk of tolerance and drug dependence.  The patient has been counseled on the need to have an exit strategy, including potentially discontinuing the use of controlled substances.  JENNA has or will be reviewed as soon as it becomes available.    Follow Up     Return in about 3 months (around 6/7/2024) for Next scheduled follow up.    Updated annual wellness visit checklist.  Immunizations discussed.  Screening discussed and/or ordered.  Recommend yearly dental and  eye exams. Also discussed monitoring of blood pressure and lipids.        Patient was given instructions and counseling regarding her condition or for health maintenance advice. Please see specific information pulled into the AVS if appropriate.     NADINE Batista

## 2024-03-07 ENCOUNTER — OFFICE VISIT (OUTPATIENT)
Dept: FAMILY MEDICINE CLINIC | Facility: CLINIC | Age: 64
End: 2024-03-07
Payer: COMMERCIAL

## 2024-03-07 VITALS
TEMPERATURE: 99.2 F | WEIGHT: 157 LBS | HEIGHT: 64 IN | BODY MASS INDEX: 26.8 KG/M2 | OXYGEN SATURATION: 96 % | HEART RATE: 106 BPM | DIASTOLIC BLOOD PRESSURE: 88 MMHG | SYSTOLIC BLOOD PRESSURE: 137 MMHG

## 2024-03-07 DIAGNOSIS — F41.0 PANIC ATTACKS: ICD-10-CM

## 2024-03-07 DIAGNOSIS — L91.8 SKIN TAGS, MULTIPLE ACQUIRED: ICD-10-CM

## 2024-03-07 DIAGNOSIS — E55.9 VITAMIN D DEFICIENCY: ICD-10-CM

## 2024-03-07 DIAGNOSIS — Z13.220 SCREENING FOR LIPID DISORDERS: ICD-10-CM

## 2024-03-07 DIAGNOSIS — Z13.29 SCREENING FOR THYROID DISORDER: ICD-10-CM

## 2024-03-07 DIAGNOSIS — Z00.00 ANNUAL PHYSICAL EXAM: Primary | ICD-10-CM

## 2024-03-07 DIAGNOSIS — R73.01 IMPAIRED FASTING GLUCOSE: ICD-10-CM

## 2024-03-07 DIAGNOSIS — F41.8 ANXIETY ABOUT HEALTH: ICD-10-CM

## 2024-03-07 PROCEDURE — 82306 VITAMIN D 25 HYDROXY: CPT | Performed by: NURSE PRACTITIONER

## 2024-03-07 PROCEDURE — 80050 GENERAL HEALTH PANEL: CPT | Performed by: NURSE PRACTITIONER

## 2024-03-07 PROCEDURE — 80061 LIPID PANEL: CPT | Performed by: NURSE PRACTITIONER

## 2024-03-07 PROCEDURE — 83036 HEMOGLOBIN GLYCOSYLATED A1C: CPT | Performed by: NURSE PRACTITIONER

## 2024-03-07 RX ORDER — ESCITALOPRAM OXALATE 5 MG/1
5 TABLET ORAL DAILY
Qty: 90 TABLET | Refills: 1 | Status: SHIPPED | OUTPATIENT
Start: 2024-03-07

## 2024-03-07 RX ORDER — CLONAZEPAM 0.5 MG/1
0.5 TABLET ORAL 2 TIMES DAILY PRN
Qty: 30 TABLET | Refills: 0 | Status: SHIPPED | OUTPATIENT
Start: 2024-03-07

## 2024-03-08 ENCOUNTER — PATIENT MESSAGE (OUTPATIENT)
Dept: FAMILY MEDICINE CLINIC | Facility: CLINIC | Age: 64
End: 2024-03-08
Payer: COMMERCIAL
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
## 2024-03-08 LAB
25(OH)D3 SERPL-MCNC: 14.6 NG/ML (ref 30–100)
ALBUMIN SERPL-MCNC: 4.2 G/DL (ref 3.5–5.2)
ALBUMIN/GLOB SERPL: 1.5 G/DL
ALP SERPL-CCNC: 74 U/L (ref 39–117)
ALT SERPL W P-5'-P-CCNC: 18 U/L (ref 1–33)
ANION GAP SERPL CALCULATED.3IONS-SCNC: 12.1 MMOL/L (ref 5–15)
AST SERPL-CCNC: 22 U/L (ref 1–32)
BILIRUB SERPL-MCNC: 0.4 MG/DL (ref 0–1.2)
BUN SERPL-MCNC: 26 MG/DL (ref 8–23)
BUN/CREAT SERPL: 30.2 (ref 7–25)
CALCIUM SPEC-SCNC: 9.2 MG/DL (ref 8.6–10.5)
CHLORIDE SERPL-SCNC: 101 MMOL/L (ref 98–107)
CHOLEST SERPL-MCNC: 221 MG/DL (ref 0–200)
CO2 SERPL-SCNC: 23.9 MMOL/L (ref 22–29)
CREAT SERPL-MCNC: 0.86 MG/DL (ref 0.57–1)
DEPRECATED RDW RBC AUTO: 40.8 FL (ref 37–54)
EGFRCR SERPLBLD CKD-EPI 2021: 75.5 ML/MIN/1.73
ERYTHROCYTE [DISTWIDTH] IN BLOOD BY AUTOMATED COUNT: 12.7 % (ref 12.3–15.4)
GLOBULIN UR ELPH-MCNC: 2.8 GM/DL
GLUCOSE SERPL-MCNC: 105 MG/DL (ref 65–99)
HBA1C MFR BLD: 6 % (ref 4.8–5.6)
HCT VFR BLD AUTO: 44 % (ref 34–46.6)
HDLC SERPL-MCNC: 55 MG/DL (ref 40–60)
HGB BLD-MCNC: 14.5 G/DL (ref 12–15.9)
LDLC SERPL CALC-MCNC: 108 MG/DL (ref 0–100)
LDLC/HDLC SERPL: 1.77 {RATIO}
MCH RBC QN AUTO: 29.1 PG (ref 26.6–33)
MCHC RBC AUTO-ENTMCNC: 33 G/DL (ref 31.5–35.7)
MCV RBC AUTO: 88.2 FL (ref 79–97)
PLATELET # BLD AUTO: 257 10*3/MM3 (ref 140–450)
PMV BLD AUTO: 10.3 FL (ref 6–12)
POTASSIUM SERPL-SCNC: 4.2 MMOL/L (ref 3.5–5.2)
PROT SERPL-MCNC: 7 G/DL (ref 6–8.5)
RBC # BLD AUTO: 4.99 10*6/MM3 (ref 3.77–5.28)
SODIUM SERPL-SCNC: 137 MMOL/L (ref 136–145)
TRIGL SERPL-MCNC: 343 MG/DL (ref 0–150)
TSH SERPL DL<=0.05 MIU/L-ACNC: 1.03 UIU/ML (ref 0.27–4.2)
VLDLC SERPL-MCNC: 58 MG/DL (ref 5–40)
WBC NRBC COR # BLD AUTO: 6.79 10*3/MM3 (ref 3.4–10.8)

## 2024-03-08 RX ORDER — CLOBETASOL PROPIONATE 0.05 MG/G
1 GEL TOPICAL EVERY 12 HOURS SCHEDULED
Qty: 30 G | Refills: 1 | Status: SHIPPED | OUTPATIENT
Start: 2024-03-08

## 2024-03-08 RX ORDER — CHOLECALCIFEROL (VITAMIN D3) 50 MCG
2000 TABLET ORAL DAILY
COMMUNITY
End: 2024-03-08 | Stop reason: SDUPTHER

## 2024-03-08 RX ORDER — CHOLECALCIFEROL (VITAMIN D3) 50 MCG
2000 TABLET ORAL DAILY
Qty: 90 TABLET | Refills: 1 | Status: SHIPPED | OUTPATIENT
Start: 2024-03-08

## 2024-04-16 NOTE — PROGRESS NOTES
Chief Complaint  Follow-up (Abnormal A1C from 3/2024) and Rash    Subjective          Divina Virk is a 63 y.o. female who presents to [facility]    History of Present Illness  History of Present Illness  The patient presents for evaluation of multiple medical concerns.    The patient underwent an x-ray left hip examination, which revealed moderate arthritis. She has been engaging in exercise for the past 7 days, which she reports as beneficial. She experiences mild discomfort during rainy weather, but refrains from holding onto the railing when ascending stairs. She has experienced a weight loss of 4 pounds.    She is a smoker. She quit smoking 13 years ago. She never smoked more than a pack a day. She smoked for 20 years.    Patient reports her anxiety is improved with taking the Lexapro daily as directed.Patient takes Klonopin as needed for breakthrough anxiety and panic attacks which she reports as efficacious.  And denies any side effects.    Patient has been erroneously marked as diabetic. Based on the available clinical information, she does not have diabetes and should therefore be excluded from diabetic health maintenance and quality measures for the remainder of the reporting period.    PHQ-2 Total Score:     PHQ-9 Total Score:          Review of Systems   Constitutional:  Negative for chills, fatigue and fever.   Respiratory:  Negative for cough and shortness of breath.    Cardiovascular:  Negative for chest pain and palpitations.   Gastrointestinal:  Negative for constipation, diarrhea, nausea and vomiting.   Musculoskeletal:  Positive for arthralgias (hip pain). Negative for back pain and neck pain.   Skin:  Negative for rash.        Skin tag right lateral neck   Neurological:  Negative for dizziness and headaches.          Medical History: has a past medical history of Anemia, Anxiety, Depression, Diabetes mellitus type II, controlled, and Menopausal symptom  (09/14/2015).     Surgical History: has a past surgical history that includes Dilation and curettage of uterus (11/01/2011); Laser ablation (11/01/2011); and Colonoscopy (10/12/2020).     Family History: family history is not on file. She was adopted.     Social History: reports that she quit smoking about 5 years ago. Her smoking use included cigarettes. She started smoking about 40 years ago. She has a 35 pack-year smoking history. She has never used smokeless tobacco. She reports that she does not drink alcohol and does not use drugs.    Allergies: Adhesive tape and Bupropion      Health Maintenance Due   Topic Date Due    TDAP/TD VACCINES (1 - Tdap) Never done    LUNG CANCER SCREENING  Never done    RSV Vaccine - Adults (1 - 1-dose 60+ series) Never done    Pneumococcal Vaccine 0-64 (2 of 2 - PPSV23 or PCV20) 12/04/2020    ZOSTER VACCINE (2 of 2) 03/07/2023    COVID-19 Vaccine (3 - 2023-24 season) 09/01/2023            Current Outpatient Medications:     Cholecalciferol (Vitamin D) 50 MCG (2000 UT) tablet, Take 1 tablet by mouth Daily., Disp: 90 tablet, Rfl: 1    clonazePAM (KlonoPIN) 0.5 MG tablet, Take 1 tablet by mouth 2 (Two) Times a Day As Needed for Anxiety., Disp: 30 tablet, Rfl: 0    Coenzyme Q10 (CO Q 10 PO), Take  by mouth., Disp: , Rfl:     COLLAGEN PO, Take  by mouth., Disp: , Rfl:     escitalopram (Lexapro) 5 MG tablet, Take 1 tablet by mouth Daily., Disp: 90 tablet, Rfl: 1    clobetasol (TEMOVATE) 0.05 % gel, Apply 1 Application topically to the appropriate area as directed Every 12 (Twelve) Hours. (Patient not taking: Reported on 5/23/2024), Disp: 30 g, Rfl: 1    meclizine (ANTIVERT) 25 MG tablet, Take 1 tablet by mouth 3 (Three) Times a Day As Needed for Dizziness. (Patient not taking: Reported on 7/5/2023), Disp: 30 tablet, Rfl: 0    triamcinolone (KENALOG) 0.1 % ointment, Apply 1 Application topically to the appropriate area as directed 2 (Two) Times a Day. (Patient not taking: Reported on  "6/6/2024), Disp: 30 g, Rfl: 1      Immunization History   Administered Date(s) Administered    COVID-19 (PFIZER) Purple Cap Monovalent 06/24/2021, 07/21/2021    Fluzone Quad >6mos (Multi-dose) 10/09/2020    Influenza Injectable Mdck Pf Quad 01/10/2023    Pneumococcal Conjugate 13-Valent (PCV13) 10/09/2020    Shingrix 01/10/2023         Objective       Vitals:    06/06/24 0735   BP: 147/87   BP Location: Right arm   Patient Position: Sitting   Cuff Size: Adult   Weight: 68.8 kg (151 lb 9.6 oz)   Height: 162.6 cm (64.02\")      Body mass index is 26.01 kg/m².   Wt Readings from Last 3 Encounters:   06/06/24 68.8 kg (151 lb 9.6 oz)   05/23/24 70.3 kg (155 lb)   03/07/24 71.2 kg (157 lb)      BP Readings from Last 3 Encounters:   06/06/24 147/87   05/23/24 153/90   03/07/24 137/88             Physical Exam  Vitals reviewed.   Constitutional:       Appearance: Normal appearance. She is well-developed.   HENT:      Head: Normocephalic and atraumatic.   Eyes:      Conjunctiva/sclera: Conjunctivae normal.      Pupils: Pupils are equal, round, and reactive to light.   Cardiovascular:      Rate and Rhythm: Normal rate and regular rhythm.      Heart sounds: Normal heart sounds. No murmur heard.  Pulmonary:      Effort: Pulmonary effort is normal.      Breath sounds: Normal breath sounds. No wheezing or rhonchi.   Abdominal:      General: Bowel sounds are normal. There is no distension.      Palpations: Abdomen is soft.      Tenderness: There is no abdominal tenderness.   Skin:     General: Skin is warm and dry.             Comments: Skin tag right lateral neck x 2.    Neurological:      Mental Status: She is alert and oriented to person, place, and time.   Psychiatric:         Mood and Affect: Mood and affect normal.         Behavior: Behavior normal.         Thought Content: Thought content normal.         Judgment: Judgment normal.             Result Review :   Results         Common labs          7/26/2023    10:43 " 10/27/2023    11:29 3/7/2024    14:07   Common Labs   Glucose 138   105    BUN 12   26    Creatinine 0.77   0.86    Sodium 139   137    Potassium 3.5   4.2    Chloride 103   101    Calcium 9.3   9.2    Albumin 4.5   4.2    Total Bilirubin 0.8   0.4    Alkaline Phosphatase 86   74    AST (SGOT) 22   22    ALT (SGPT) 27   18    WBC 8.79   6.79    Hemoglobin 15.4   14.5    Hematocrit 45.5   44.0    Platelets 298   257    Total Cholesterol  226  221    Triglycerides  358  343    HDL Cholesterol  61  55    LDL Cholesterol   105  108    Hemoglobin A1C   6.00          Cryotherapy, Skin Lesion    Date/Time: 6/6/2024 7:51 AM    Performed by: Nel Juarez APRN  Authorized by: Nel Juarez APRN  Preparation: Patient was prepped and draped in the usual sterile fashion.  Local anesthesia used: no    Anesthesia:  Local anesthesia used: no    Sedation:  Patient sedated: no    Patient tolerance: patient tolerated the procedure well with no immediate complications  Comments: All 3 lesions on the lateral neck were treated with 3 freeze thaw cycles of 10 seconds each.                Assessment and Plan        Diagnoses and all orders for this visit:    1. Impaired fasting glucose (Primary)  Comments:  Continue to work on diet and exercise.    2. Encounter for screening for lung cancer  -      CT Chest Low Dose Cancer Screening WO; Future    3. Panic attacks  -     clonazePAM (KlonoPIN) 0.5 MG tablet; Take 1 tablet by mouth 2 (Two) Times a Day As Needed for Anxiety.  Dispense: 30 tablet; Refill: 0    4. Encounter for medication monitoring  -     POC Medline 12 Panel Urine Drug Screen    5. Skin tags, multiple acquired  -     Cryotherapy, Skin Lesion        Assessment & Plan  1. Lung cancer screening.  A lung cancer screening CT scan has been ordered.    2. Medication refill.  Prescriptions for Klonopin and Lexapro have been refilled. A urine drug screen will be conducted.    Follow Up     Return in about 3 months (around  9/6/2024) for Next scheduled follow up.    Patient was given instructions and counseling regarding her condition or for health maintenance advice. Please see specific information pulled into the AVS if appropriate.     NADINE Batista    Patient or patient representative verbalized consent for the use of Ambient Listening during the visit with  NADINE Batista for chart documentation. 6/6/2024  07:55 EDT

## 2024-05-23 ENCOUNTER — HOSPITAL ENCOUNTER (OUTPATIENT)
Dept: GENERAL RADIOLOGY | Facility: HOSPITAL | Age: 64
Discharge: HOME | End: 2024-05-23
Admitting: NURSE PRACTITIONER
Payer: COMMERCIAL

## 2024-05-23 ENCOUNTER — OFFICE VISIT (OUTPATIENT)
Dept: FAMILY MEDICINE CLINIC | Facility: CLINIC | Age: 64
End: 2024-05-23
Payer: COMMERCIAL

## 2024-05-23 VITALS
BODY MASS INDEX: 26.46 KG/M2 | WEIGHT: 155 LBS | HEART RATE: 80 BPM | SYSTOLIC BLOOD PRESSURE: 153 MMHG | TEMPERATURE: 98.4 F | OXYGEN SATURATION: 98 % | HEIGHT: 64 IN | DIASTOLIC BLOOD PRESSURE: 90 MMHG

## 2024-05-23 DIAGNOSIS — L23.7 POISON IVY: ICD-10-CM

## 2024-05-23 DIAGNOSIS — M25.552 PAIN OF LEFT HIP: ICD-10-CM

## 2024-05-23 DIAGNOSIS — L30.9 DERMATITIS: Primary | ICD-10-CM

## 2024-05-23 PROCEDURE — 73502 X-RAY EXAM HIP UNI 2-3 VIEWS: CPT

## 2024-05-23 PROCEDURE — 99213 OFFICE O/P EST LOW 20 MIN: CPT | Performed by: NURSE PRACTITIONER

## 2024-05-23 PROCEDURE — 96372 THER/PROPH/DIAG INJ SC/IM: CPT | Performed by: NURSE PRACTITIONER

## 2024-05-23 RX ORDER — METHYLPREDNISOLONE ACETATE 80 MG/ML
80 INJECTION, SUSPENSION INTRA-ARTICULAR; INTRALESIONAL; INTRAMUSCULAR; SOFT TISSUE ONCE
Status: COMPLETED | OUTPATIENT
Start: 2024-05-23 | End: 2024-05-23

## 2024-05-23 RX ORDER — TRIAMCINOLONE ACETONIDE 1 MG/G
1 OINTMENT TOPICAL 2 TIMES DAILY
Qty: 30 G | Refills: 1 | Status: SHIPPED | OUTPATIENT
Start: 2024-05-23

## 2024-05-23 RX ADMIN — METHYLPREDNISOLONE ACETATE 80 MG: 80 INJECTION, SUSPENSION INTRA-ARTICULAR; INTRALESIONAL; INTRAMUSCULAR; SOFT TISSUE at 12:08

## 2024-05-23 NOTE — PROGRESS NOTES
Chief Complaint  Rash    Subjective          Divina Virk is a 63 y.o. female who presents to [facility]    History of Present Illness  History of Present Illness  The patient presents for evaluation of poison ivy rash.    The patient recently developed a poison ivy rash, a condition she had during her previous visit, which was effectively managed with a topical cream. The rash, which has been present for a week, is spreading across her breasts and other areas. The rash is characterized by itching, a small blister, and upon scratching, the pimples rupture and subsequently form a scab. The itching does not intensify during the night.    The patient reports experiencing pain in her left hip, which occasionally disrupts her sleep. The severity of the pain has improved today. She occasionally uses a pillow between her legs to elevate her hips during sleep. An x-ray of her left hip was previously ordered in 08/2023, however, the patient did not obtain XR.    Patient has been erroneously marked as diabetic. Based on the available clinical information, she does not have diabetes and should therefore be excluded from diabetic health maintenance and quality measures for the remainder of the reporting period.    PHQ-2 Total Score:     PHQ-9 Total Score:          Review of Systems   Constitutional:  Negative for fever.   Musculoskeletal:  Positive for arthralgias (left hip).   Skin:  Positive for rash.          Medical History: has a past medical history of Anemia, Anxiety, Depression, Diabetes mellitus type II, controlled, and Menopausal symptom (09/14/2015).     Surgical History: has a past surgical history that includes Dilation and curettage of uterus (11/01/2011); Laser ablation (11/01/2011); and Colonoscopy (10/12/2020).     Family History: family history is not on file. She was adopted.     Social History: reports that she quit smoking about 5 years ago. Her smoking use included  cigarettes. She started smoking about 40 years ago. She has a 35 pack-year smoking history. She has never used smokeless tobacco. She reports that she does not drink alcohol and does not use drugs.    Allergies: Adhesive tape and Bupropion      Health Maintenance Due   Topic Date Due    URINE MICROALBUMIN  Never done    DIABETIC EYE EXAM  Never done    TDAP/TD VACCINES (1 - Tdap) Never done    LUNG CANCER SCREENING  Never done    RSV Vaccine - Adults (1 - 1-dose 60+ series) Never done    Pneumococcal Vaccine 0-64 (2 of 2 - PPSV23 or PCV20) 12/04/2020    DIABETIC FOOT EXAM  Never done    ZOSTER VACCINE (2 of 2) 03/07/2023    COVID-19 Vaccine (3 - 2023-24 season) 09/01/2023            Current Outpatient Medications:     Cholecalciferol (Vitamin D) 50 MCG (2000 UT) tablet, Take 1 tablet by mouth Daily., Disp: 90 tablet, Rfl: 1    clonazePAM (KlonoPIN) 0.5 MG tablet, Take 1 tablet by mouth 2 (Two) Times a Day As Needed for Anxiety., Disp: 30 tablet, Rfl: 0    COLLAGEN PO, Take  by mouth., Disp: , Rfl:     escitalopram (Lexapro) 5 MG tablet, Take 1 tablet by mouth Daily., Disp: 90 tablet, Rfl: 1    triamcinolone (KENALOG) 0.1 % ointment, Apply 1 Application topically to the appropriate area as directed 2 (Two) Times a Day., Disp: 30 g, Rfl: 1    clobetasol (TEMOVATE) 0.05 % gel, Apply 1 Application topically to the appropriate area as directed Every 12 (Twelve) Hours. (Patient not taking: Reported on 5/23/2024), Disp: 30 g, Rfl: 1    meclizine (ANTIVERT) 25 MG tablet, Take 1 tablet by mouth 3 (Three) Times a Day As Needed for Dizziness. (Patient not taking: Reported on 7/5/2023), Disp: 30 tablet, Rfl: 0  No current facility-administered medications for this visit.      Immunization History   Administered Date(s) Administered    COVID-19 (PFIZER) Purple Cap Monovalent 06/24/2021, 07/21/2021    Fluzone Quad >6mos (Multi-dose) 10/09/2020    Influenza Injectable Mdck Pf Quad 01/10/2023    Pneumococcal Conjugate 13-Valent  "(PCV13) 10/09/2020    Shingrix 01/10/2023         Objective       Vitals:    05/23/24 1149 05/23/24 1151   BP: 165/98 153/90   Pulse: 80    Temp: 98.4 °F (36.9 °C)    TempSrc: Temporal    SpO2: 98%    Weight: 70.3 kg (155 lb)    Height: 162.6 cm (64.02\")       Body mass index is 26.59 kg/m².   Wt Readings from Last 3 Encounters:   05/23/24 70.3 kg (155 lb)   03/07/24 71.2 kg (157 lb)   09/07/23 71.6 kg (157 lb 12.8 oz)      BP Readings from Last 3 Encounters:   05/23/24 153/90   03/07/24 137/88   09/07/23 160/87             Physical Exam  Vitals reviewed.   Constitutional:       Appearance: Normal appearance. She is well-developed.   HENT:      Head: Normocephalic and atraumatic.   Eyes:      Conjunctiva/sclera: Conjunctivae normal.      Pupils: Pupils are equal, round, and reactive to light.   Cardiovascular:      Rate and Rhythm: Normal rate and regular rhythm.      Heart sounds: Normal heart sounds. No murmur heard.  Pulmonary:      Effort: Pulmonary effort is normal.      Breath sounds: Normal breath sounds. No wheezing or rhonchi.   Abdominal:      General: Bowel sounds are normal. There is no distension.      Palpations: Abdomen is soft.      Tenderness: There is no abdominal tenderness.   Skin:     General: Skin is warm and dry.      Comments: Multiple excoriated papules on hand forearms and chest.    Neurological:      Mental Status: She is alert and oriented to person, place, and time.   Psychiatric:         Mood and Affect: Mood and affect normal.         Behavior: Behavior normal.         Thought Content: Thought content normal.         Judgment: Judgment normal.         Physical Exam        Result Review :   Results         Common labs          7/26/2023    10:43 10/27/2023    11:29 3/7/2024    14:07   Common Labs   Glucose 138   105    BUN 12   26    Creatinine 0.77   0.86    Sodium 139   137    Potassium 3.5   4.2    Chloride 103   101    Calcium 9.3   9.2    Albumin 4.5   4.2    Total Bilirubin 0.8   " 0.4    Alkaline Phosphatase 86   74    AST (SGOT) 22   22    ALT (SGPT) 27   18    WBC 8.79   6.79    Hemoglobin 15.4   14.5    Hematocrit 45.5   44.0    Platelets 298   257    Total Cholesterol  226  221    Triglycerides  358  343    HDL Cholesterol  61  55    LDL Cholesterol   105  108    Hemoglobin A1C   6.00                     Assessment and Plan        Diagnoses and all orders for this visit:    1. Dermatitis (Primary)  -     methylPREDNISolone acetate (DEPO-medrol) injection 80 mg    2. Poison ivy  -     triamcinolone (KENALOG) 0.1 % ointment; Apply 1 Application topically to the appropriate area as directed 2 (Two) Times a Day.  Dispense: 30 g; Refill: 1        Assessment & Plan  1. Poison Ivy Dermatitis.  The patient will be administered a steroid injection today. Additionally, a refill of topical triamcinolone cream will be provided. Counseled on injection.    2. Left Hip Pain.  The patient does not exhibit any signs of arthritis. An x-ray of the left hip will be ordered to further investigate the cause of the pain.    Follow Up     Return if symptoms worsen or fail to improve.    Patient was given instructions and counseling regarding her condition or for health maintenance advice. Please see specific information pulled into the AVS if appropriate.     NADINE Batista    Patient or patient representative verbalized consent for the use of Ambient Listening during the visit with  NADINE Batista for chart documentation. 5/23/2024  11:58 EDT

## 2024-06-06 ENCOUNTER — OFFICE VISIT (OUTPATIENT)
Dept: FAMILY MEDICINE CLINIC | Facility: CLINIC | Age: 64
End: 2024-06-06
Payer: COMMERCIAL

## 2024-06-06 VITALS
WEIGHT: 151.6 LBS | BODY MASS INDEX: 25.88 KG/M2 | DIASTOLIC BLOOD PRESSURE: 87 MMHG | HEIGHT: 64 IN | SYSTOLIC BLOOD PRESSURE: 147 MMHG

## 2024-06-06 DIAGNOSIS — F41.0 PANIC ATTACKS: ICD-10-CM

## 2024-06-06 DIAGNOSIS — R73.01 IMPAIRED FASTING GLUCOSE: Primary | ICD-10-CM

## 2024-06-06 DIAGNOSIS — L91.8 SKIN TAGS, MULTIPLE ACQUIRED: ICD-10-CM

## 2024-06-06 DIAGNOSIS — Z12.2 ENCOUNTER FOR SCREENING FOR LUNG CANCER: ICD-10-CM

## 2024-06-06 DIAGNOSIS — Z51.81 ENCOUNTER FOR MEDICATION MONITORING: ICD-10-CM

## 2024-06-06 PROBLEM — E11.9 DIABETES MELLITUS TYPE II, CONTROLLED: Status: RESOLVED | Noted: 2021-08-04 | Resolved: 2024-06-06

## 2024-06-06 LAB
AMPHET+METHAMPHET UR QL: NEGATIVE
AMPHETAMINE INTERNAL CONTROL: ABNORMAL
AMPHETAMINES UR QL: NEGATIVE
BARBITURATE INTERNAL CONTROL: ABNORMAL
BARBITURATES UR QL SCN: NEGATIVE
BENZODIAZ UR QL SCN: NEGATIVE
BENZODIAZEPINE INTERNAL CONTROL: ABNORMAL
BUPRENORPHINE INTERNAL CONTROL: ABNORMAL
BUPRENORPHINE SERPL-MCNC: NEGATIVE NG/ML
CANNABINOIDS SERPL QL: POSITIVE
COCAINE INTERNAL CONTROL: ABNORMAL
COCAINE UR QL: NEGATIVE
EXPIRATION DATE: ABNORMAL
Lab: ABNORMAL
MDMA (ECSTASY) INTERNAL CONTROL: ABNORMAL
MDMA UR QL SCN: NEGATIVE
METHADONE INTERNAL CONTROL: ABNORMAL
METHADONE UR QL SCN: NEGATIVE
METHAMPHETAMINE INTERNAL CONTROL: ABNORMAL
MORPHINE INTERNAL CONTROL: ABNORMAL
MORPHINE/OPIATES SCREEN, URINE: NEGATIVE
OXYCODONE INTERNAL CONTROL: ABNORMAL
OXYCODONE UR QL SCN: NEGATIVE
PCP UR QL SCN: NEGATIVE
PHENCYCLIDINE INTERNAL CONTROL: ABNORMAL
THC INTERNAL CONTROL: ABNORMAL

## 2024-06-06 PROCEDURE — 99213 OFFICE O/P EST LOW 20 MIN: CPT | Performed by: NURSE PRACTITIONER

## 2024-06-06 RX ORDER — CLONAZEPAM 0.5 MG/1
0.5 TABLET ORAL 2 TIMES DAILY PRN
Qty: 30 TABLET | Refills: 0 | Status: SHIPPED | OUTPATIENT
Start: 2024-06-06

## 2024-06-06 NOTE — PATIENT INSTRUCTIONS
Generalized Anxiety Disorder, Adult  Generalized anxiety disorder (LEO) is a mental health condition. Unlike normal worries, anxiety related to LEO is not triggered by a specific event. These worries do not fade or get better with time. LEO interferes with relationships, work, and school.  LEO symptoms can vary from mild to severe. People with severe LEO can have intense waves of anxiety with physical symptoms that are similar to panic attacks.  What are the causes?  The exact cause of LEO is not known, but the following are believed to have an impact:  Differences in natural brain chemicals.  Genes passed down from parents to children.  Differences in the way threats are perceived.  Development and stress during childhood.  Personality.  What increases the risk?  The following factors may make you more likely to develop this condition:  Being female.  Having a family history of anxiety disorders.  Being very shy.  Experiencing very stressful life events, such as the death of a loved one.  Having a very stressful family environment.  What are the signs or symptoms?  People with LEO often worry excessively about many things in their lives, such as their health and family. Symptoms may also include:  Mental and emotional symptoms:  Worrying excessively about natural disasters.  Fear of being late.  Difficulty concentrating.  Fears that others are judging your performance.  Physical symptoms:  Fatigue.  Headaches, muscle tension, muscle twitches, trembling, or feeling shaky.  Feeling like your heart is pounding or beating very fast.  Feeling out of breath or like you cannot take a deep breath.  Having trouble falling asleep or staying asleep, or experiencing restlessness.  Sweating.  Nausea, diarrhea, or irritable bowel syndrome (IBS).  Behavioral symptoms:  Experiencing erratic moods or irritability.  Avoidance of new situations.  Avoidance of people.  Extreme difficulty making decisions.  How is this diagnosed?  This  condition is diagnosed based on your symptoms and medical history. You will also have a physical exam. Your health care provider may perform tests to rule out other possible causes of your symptoms.  To be diagnosed with LEO, a person must have anxiety that:  Is out of his or her control.  Affects several different aspects of his or her life, such as work and relationships.  Causes distress that makes him or her unable to take part in normal activities.  Includes at least three symptoms of LEO, such as restlessness, fatigue, trouble concentrating, irritability, muscle tension, or sleep problems.  Before your health care provider can confirm a diagnosis of LEO, these symptoms must be present more days than they are not, and they must last for 6 months or longer.  How is this treated?  This condition may be treated with:  Medicine. Antidepressant medicine is usually prescribed for long-term daily control. Anti-anxiety medicines may be added in severe cases, especially when panic attacks occur.  Talk therapy (psychotherapy). Certain types of talk therapy can be helpful in treating LEO by providing support, education, and guidance. Options include:  Cognitive behavioral therapy (CBT). People learn coping skills and self-calming techniques to ease their physical symptoms. They learn to identify unrealistic thoughts and behaviors and to replace them with more appropriate thoughts and behaviors.  Acceptance and commitment therapy (ACT). This treatment teaches people how to be mindful as a way to cope with unwanted thoughts and feelings.  Biofeedback. This process trains you to manage your body's response (physiological response) through breathing techniques and relaxation methods. You will work with a therapist while machines are used to monitor your physical symptoms.  Stress management techniques. These include yoga, meditation, and exercise.  A mental health specialist can help determine which treatment is best for you.  Some people see improvement with one type of therapy. However, other people require a combination of therapies.  Follow these instructions at home:  Lifestyle  Maintain a consistent routine and schedule.  Anticipate stressful situations. Create a plan and allow extra time to work with your plan.  Practice stress management or self-calming techniques that you have learned from your therapist or your health care provider.  Exercise regularly and spend time outdoors.  Eat a healthy diet that includes plenty of vegetables, fruits, whole grains, low-fat dairy products, and lean protein.  Do not eat a lot of foods that are high in fat, added sugar, or salt (sodium).  Drink plenty of water.  Avoid alcohol. Alcohol can increase anxiety.  Avoid caffeine and certain over-the-counter cold medicines. These may make you feel worse. Ask your pharmacist which medicines to avoid.  General instructions  Take over-the-counter and prescription medicines only as told by your health care provider.  Understand that you are likely to have setbacks. Accept this and be kind to yourself as you persist to take better care of yourself.  Anticipate stressful situations. Create a plan and allow extra time to work with your plan.  Recognize and accept your accomplishments, even if you  them as small.  Spend time with people who care about you.  Keep all follow-up visits. This is important.  Where to find more information  [place] of Mental Health: www.nimh.nih.gov  Substance Abuse and Mental Health Services: www.samhsa.gov  Contact a health care provider if:  Your symptoms do not get better.  Your symptoms get worse.  You have signs of depression, such as:  A persistently sad or irritable mood.  Loss of enjoyment in activities that used to bring you aspen.  Change in weight or eating.  Changes in sleeping habits.  Get help right away if:  You have thoughts about hurting yourself or others.  If you ever feel like you may hurt  yourself or others, or have thoughts about taking your own life, get help right away. Go to your nearest emergency department or:  Call your local emergency services (871 in the [place]  Call a suicide crisis helpline, such as the National Suicide Prevention Lifeline at 1-934.250.1908 or 715 in the [place] This is open 24 hours a day in the [place]  Text the Crisis Text Line at 211913 (in the [place]  Summary  Generalized anxiety disorder (LEO) is a mental health condition that involves worry that is not triggered by a specific event.  People with LEO often worry excessively about many things in their lives, such as their health and family.  LEO may cause symptoms such as restlessness, trouble concentrating, sleep problems, frequent sweating, nausea, diarrhea, headaches, and trembling or muscle twitching.  A mental health specialist can help determine which treatment is best for you. Some people see improvement with one type of therapy. However, other people require a combination of therapies.  This information is not intended to replace advice given to you by your health care provider. Make sure you discuss any questions you have with your health care provider.  Document Revised: 07/13/2022 Document Reviewed: 04/10/2022  Elsevier Patient Education © 2024 [employer]

## 2024-06-13 ENCOUNTER — HOSPITAL ENCOUNTER (EMERGENCY)
Facility: HOSPITAL | Age: 64
Discharge: HOME | End: 2024-06-13
Attending: EMERGENCY MEDICINE
Payer: COMMERCIAL

## 2024-06-13 ENCOUNTER — APPOINTMENT (OUTPATIENT)
Dept: GENERAL RADIOLOGY | Facility: HOSPITAL | Age: 64
End: 2024-06-13
Payer: COMMERCIAL

## 2024-06-13 VITALS
DIASTOLIC BLOOD PRESSURE: 95 MMHG | WEIGHT: 151 LBS | OXYGEN SATURATION: 96 % | SYSTOLIC BLOOD PRESSURE: 145 MMHG | BODY MASS INDEX: 25.78 KG/M2 | TEMPERATURE: 98 F | HEIGHT: 64 IN | HEART RATE: 76 BPM | RESPIRATION RATE: 17 BRPM

## 2024-06-13 DIAGNOSIS — S82.041A CLOSED DISPLACED COMMINUTED FRACTURE OF RIGHT PATELLA, INITIAL ENCOUNTER: Primary | ICD-10-CM

## 2024-06-13 PROCEDURE — 73562 X-RAY EXAM OF KNEE 3: CPT

## 2024-06-13 PROCEDURE — 99283 EMERGENCY DEPT VISIT LOW MDM: CPT

## 2024-06-13 RX ORDER — HYDROCODONE BITARTRATE AND ACETAMINOPHEN 5; 325 MG/1; MG/1
1 TABLET ORAL EVERY 6 HOURS PRN
Qty: 12 TABLET | Refills: 0 | Status: SHIPPED | OUTPATIENT
Start: 2024-06-13 | End: 2024-06-14 | Stop reason: ALTCHOICE

## 2024-06-13 RX ORDER — GINSENG 100 MG
1 CAPSULE ORAL ONCE
Status: COMPLETED | OUTPATIENT
Start: 2024-06-13 | End: 2024-06-13

## 2024-06-13 RX ADMIN — BACITRACIN 0.9 G: 500 OINTMENT TOPICAL at 16:08

## 2024-06-13 NOTE — DISCHARGE INSTRUCTIONS
Do not bear weight on the leg.  Wear leg brace as directed and utilize crutches.  You have an appointment with  tomorrow at 1 PM and he will complete surgery on Saturday.

## 2024-06-13 NOTE — ED PROVIDER NOTES
Time: 3:16 PM EDT  Date of encounter:  6/13/2024  Independent Historian/Clinical History and Information was obtained by:   Patient and Family    History is limited by: N/A    Chief Complaint: Right knee pain      History of Present Illness:  Patient is a 63 y.o. year old female who presents to the emergency department for evaluation of right knee pain.  Patient states that she was walking down the steps into a pool when she tripped and fell.  Patient states he fell into the pool and landed directly on the right knee.  Patient denies hitting her head or any head injury.  She states that her head did not go into the water as well.  Patient is complaining of right knee pain and abrasion to her right great toe.  No other complaints.  (Provider in triage, Mac Chance PA-C)    [facility]    Patient Care Team  Primary Care Provider: Nel Juarez APRN    Past Medical History:     Allergies   Allergen Reactions    Adhesive Tape Rash    Bupropion Unknown - Low Severity     Didn't like the way she felt taking     Past Medical History:   Diagnosis Date    Anemia     Anxiety     Depression     Diabetes mellitus type II, controlled     diet controlled    Menopausal symptom 09/14/2015     Past Surgical History:   Procedure Laterality Date    COLONOSCOPY  10/12/2020    9/11/15 2 small polyps    DILATATION AND CURETTAGE  11/01/2011    LASER ABLATION  11/01/2011    Uterine Ablation     Family History   Adopted: Yes       Home Medications:  Prior to Admission medications    Medication Sig Start Date End Date Taking? Authorizing Provider   Cholecalciferol (Vitamin D) 50 MCG (2000 UT) tablet Take 1 tablet by mouth Daily. 3/8/24   Nel Juarez APRN   clobetasol (TEMOVATE) 0.05 % gel Apply 1 Application topically to the appropriate area as directed Every 12 (Twelve) Hours.  Patient not taking: Reported on 5/23/2024 3/8/24   Nel Juarez APRN   clonazePAM (KlonoPIN) 0.5 MG tablet Take 1 tablet by mouth 2 (Two) Times a Day As  "Needed for Anxiety. 24   Nel Juarez APRN   Coenzyme Q10 (CO Q 10 PO) Take  by mouth.    Provider, Historical, MD   COLLAGEN PO Take  by mouth.    Provider, MD Daily   escitalopram (Lexapro) 5 MG tablet Take 1 tablet by mouth Daily. 3/7/24   Nel Juarez APRN   meclizine (ANTIVERT) 25 MG tablet Take 1 tablet by mouth 3 (Three) Times a Day As Needed for Dizziness.  Patient not taking: Reported on 2023   Jaya Davis MD   triamcinolone (KENALOG) 0.1 % ointment Apply 1 Application topically to the appropriate area as directed 2 (Two) Times a Day.  Patient not taking: Reported on 2024   Nel Juarez APRN        Social History:   Social History     Tobacco Use    Smoking status: Former     Current packs/day: 0.00     Average packs/day: 1 pack/day for 35.0 years (35.0 ttl pk-yrs)     Types: Cigarettes     Start date: 1983     Quit date: 2018     Years since quittin.4    Smokeless tobacco: Never   Vaping Use    Vaping status: Never Used   Substance Use Topics    Alcohol use: Never    Drug use: Never         Review of Systems:  Review of Systems   Musculoskeletal:  Positive for arthralgias.   Skin:  Positive for wound.   All other systems reviewed and are negative.       Physical Exam:  /83   Pulse 86   Temp 98.3 °F (36.8 °C) (Oral)   Resp 16   Ht 161.3 cm (63.5\")   Wt 68.5 kg (151 lb)   SpO2 100%   BMI 26.33 kg/m²     Physical Exam  Vitals and nursing note reviewed.   Constitutional:       Appearance: Normal appearance.   HENT:      Head: Normocephalic and atraumatic.      Nose: Nose normal.   Eyes:      Extraocular Movements: Extraocular movements intact.      Conjunctiva/sclera: Conjunctivae normal.      Pupils: Pupils are equal, round, and reactive to light.   Cardiovascular:      Rate and Rhythm: Normal rate and regular rhythm.      Heart sounds: Normal heart sounds.   Pulmonary:      Effort: Pulmonary effort is normal.      Breath sounds: " Normal breath sounds.   Abdominal:      General: Abdomen is flat. There is no distension.      Palpations: Abdomen is soft.   Musculoskeletal:         General: Normal range of motion.      Cervical back: Normal range of motion and neck supple.      Right knee: Swelling and bony tenderness present. No deformity, effusion, erythema, ecchymosis or lacerations. Decreased range of motion. Tenderness present. Normal pulse.        Legs:    Skin:     General: Skin is warm and dry.   Neurological:      General: No focal deficit present.      Mental Status: She is alert and oriented to person, place, and time.   Psychiatric:         Mood and Affect: Mood normal.         Behavior: Behavior normal.         Thought Content: Thought content normal.         Judgment: Judgment normal.                Procedures:  Procedures    Medical Decision Making:    Comorbidities that affect care:    Diabetes    External Notes reviewed:    Previous Clinic Note: Patient last seen by family medicine on 6/6-24      The following orders were placed and all results were independently analyzed by me:  Orders Placed This Encounter   Procedures    XR Knee 3 View Right    Obtain & Apply The Following- Lower extremity; Knee immobilizer    Crutches (fit & training)    IP General Consult (Use specialty-specific consult if known)       Medications Given in the Emergency Department:  Medications   bacitracin 500 UNIT/GM ointment 0.9 g (has no administration in time range)        ED Course:         Labs:    Lab Results (last 24 hours)       ** No results found for the last 24 hours. **             Imaging:    XR Knee 3 View Right    Result Date: 6/13/2024  XR KNEE 3 VW RIGHT Date of Exam: 6/13/2024 2:56 PM EDT Indication: pain, fall Comparison: None available. Findings: There is a comminuted fracture of the patella. There is distraction of the dominant superior pole fragment. There is no fracture of the distal femur and proximal tibia and fibula. There is no  significant joint effusion     Impression: Comminuted fracture of the patella Electronically Signed: Cr Baltazar  6/13/2024 3:00 PM EDT  Workstation ID: [id number]       Differential Diagnosis and Discussion:    Joint Pain: Differential diagnosis includes but is not limited to polyarticular arthritis, gout, tendinitis, hemarthrosis, septic arthritis, rheumatoid arthritis, bursitis, degenerative joint disease, joint effusion, autoimmune disorder, trauma, and occult neoplasm.    All X-rays impressions were independently interpreted by me.    MDM     Amount and/or Complexity of Data Reviewed  Tests in the radiology section of CPT®: reviewed and ordered    Risk of Complications, Morbidity, and/or Mortality  Presenting problems: moderate  Diagnostic procedures: low  Management options: low    Patient Progress  Patient progress: stable       Patient Care Considerations:          Consultants/Shared Management Plan:    Consultant: I have discussed the case with Dr. Quintanilla who states he agrees with straight leg brace and crutches and he will follow-up with patient in the office at 1 PM tomorrow and do surgery on Saturday    Social Determinants of Health:    Patient is independent, reliable, and has access to care.       Disposition and Care Coordination:    Discharged: The patient is suitable and stable for discharge with no need for consideration of admission.    I have explained the patient´s condition, diagnoses and treatment plan based on the information available to me at this time. I have answered questions and addressed any concerns. The patient has a good  understanding of the patient´s diagnosis, condition, and treatment plan as can be expected at this point. The vital signs have been stable. The patient´s condition is stable and appropriate for discharge from the emergency department.      The patient will pursue further outpatient evaluation with the primary care physician or other designated or consulting physician  as outlined in the discharge instructions. They are agreeable to this plan of care and follow-up instructions have been explained in detail. The patient has received these instructions in written format and has expressed an understanding of the discharge instructions. The patient is aware that any significant change in condition or worsening of symptoms should prompt an immediate return to this or the closest emergency department or call to 911.  I have explained discharge medications and the need for follow up with the patient/caretakers. This was also printed in the discharge instructions. Patient was discharged with the following medications and follow up:      Medication List        New Prescriptions      HYDROcodone-acetaminophen 5-325 MG per tablet  Commonly known as: NORCO  Take 1 tablet by mouth Every 6 (Six) Hours As Needed for Moderate Pain.               Where to Get Your Medications        These medications were sent to [id number] - [place], [place] [address] AT [facility] OF [address]/[facility] & [place] - [phone]  - [phone]   [address] [place] [address]      Phone: [phone]   HYDROcodone-acetaminophen 5-325 MG per tablet      Been, Salinas CHANCE MD  [address]   [place] [address] [phone]             Final diagnoses:   Closed displaced comminuted fracture of right patella, initial encounter        ED Disposition       ED Disposition   Discharge    Condition   Stable    Comment   --               This medical record created using voice recognition software.             Mac Chance PA-C  06/13/24 1052

## 2024-06-13 NOTE — ED NOTES
Abrasions on patient's right great toe cleaned by this RN. Bacitracin also applied per orders at this time. Small mepalex applied to both areas, as patient states these areas hurt worse than her fractured patella.

## 2024-06-14 ENCOUNTER — OFFICE VISIT (OUTPATIENT)
Dept: ORTHOPEDIC SURGERY | Facility: CLINIC | Age: 64
End: 2024-06-14
Payer: COMMERCIAL

## 2024-06-14 ENCOUNTER — PREP FOR SURGERY (OUTPATIENT)
Dept: OTHER | Facility: HOSPITAL | Age: 64
End: 2024-06-14
Payer: COMMERCIAL

## 2024-06-14 VITALS
HEART RATE: 86 BPM | DIASTOLIC BLOOD PRESSURE: 68 MMHG | HEIGHT: 64 IN | SYSTOLIC BLOOD PRESSURE: 100 MMHG | WEIGHT: 151 LBS | OXYGEN SATURATION: 95 % | BODY MASS INDEX: 25.78 KG/M2

## 2024-06-14 DIAGNOSIS — M25.561 RIGHT KNEE PAIN, UNSPECIFIED CHRONICITY: Primary | ICD-10-CM

## 2024-06-14 DIAGNOSIS — S82.041A CLOSED DISPLACED COMMINUTED FRACTURE OF RIGHT PATELLA, INITIAL ENCOUNTER: Primary | ICD-10-CM

## 2024-06-14 DIAGNOSIS — S82.041A CLOSED DISPLACED COMMINUTED FRACTURE OF RIGHT PATELLA, INITIAL ENCOUNTER: ICD-10-CM

## 2024-06-14 PROBLEM — S82.009A PATELLA FRACTURE: Status: ACTIVE | Noted: 2024-06-14

## 2024-06-14 PROCEDURE — 99203 OFFICE O/P NEW LOW 30 MIN: CPT | Performed by: ORTHOPAEDIC SURGERY

## 2024-06-14 RX ORDER — HYDROCODONE BITARTRATE AND ACETAMINOPHEN 7.5; 325 MG/1; MG/1
1 TABLET ORAL EVERY 4 HOURS PRN
Qty: 42 TABLET | Refills: 0 | Status: SHIPPED | OUTPATIENT
Start: 2024-06-14

## 2024-06-14 NOTE — PROGRESS NOTES
"Chief Complaint  Initial Evaluation of the Right Knee     Subjective      Divina F Major presents to [facility] for initial evaluation of the right knee. Patient states that she was walking down the steps into a pool when she tripped and fell. She had a fall 24.  She is in a knee immobilizer and has bruising and swelling.     Allergies   Allergen Reactions    Adhesive Tape Rash    Bupropion Unknown - Low Severity     Didn't like the way she felt taking        Social History     Socioeconomic History    Marital status:    Tobacco Use    Smoking status: Former     Current packs/day: 0.00     Average packs/day: 1 pack/day for 35.0 years (35.0 ttl pk-yrs)     Types: Cigarettes     Start date: 1983     Quit date: 2018     Years since quittin.4    Smokeless tobacco: Never   Vaping Use    Vaping status: Never Used   Substance and Sexual Activity    Alcohol use: Never    Drug use: Never    Sexual activity: Defer        I reviewed the patient's chief complaint, history of present illness, review of systems, past medical history, surgical history, family history, social history, medications, and allergy list.     Review of Systems     Constitutional: Denies fevers, chills, weight loss  Cardiovascular: Denies chest pain, shortness of breath  Skin: Denies rashes, acute skin changes  Neurologic: Denies headache, loss of consciousness        Vital Signs:   /68   Pulse 86   Ht 161.3 cm (63.5\")   Wt 68.5 kg (151 lb)   SpO2 95%   BMI 26.33 kg/m²          Physical Exam  General: Alert. No acute distress    Ortho Exam        RIGHT KNEE She is in a knee immobilizer. She has swelling and bruising of the right knee.  Neurovascularly intact.  Positive EHL, FHL, HS and TA. Sensation intact to light touch all 5 nerves of the foot.  Calf supple, non-tender.     Order for a wheelchair given today due to patients mobility limitation impairs ability to participate in daily " ADL's. Mobility limitation cannot be resolved by a cane or walker. Patient can safely and independently maneuver the wheelchair. A caregiver is available if the patient does not have the strength to self propel the wheelchair.      Procedures        Imaging Results (Most Recent)       None             Result Review :         XR Knee 3 View Right    Result Date: 6/13/2024  Narrative: XR KNEE 3 VW RIGHT Date of Exam: 6/13/2024 2:56 PM EDT Indication: pain, fall Comparison: None available. Findings: There is a comminuted fracture of the patella. There is distraction of the dominant superior pole fragment. There is no fracture of the distal femur and proximal tibia and fibula. There is no significant joint effusion     Impression: Impression: Comminuted fracture of the patella Electronically Signed: Cr Baltazar  6/13/2024 3:00 PM EDT  Workstation ID: [id number]    XR Hip With or Without Pelvis 2 - 3 View Left    Result Date: 5/24/2024  Narrative: XR HIP W OR WO PELVIS 2-3 VIEW LEFT-  Date of Exam: 5/23/2024 12:54 PM  Indication: left hip pain; M25.552-Pain in left hip  Comparison: None available.  Findings: Bone mineral density is normal. No fractures or dislocations. Moderate degenerative change of the left hip. No aggressive osseous lesions.      Impression: Impression: Moderate arthritis left hip.   Electronically Signed By-Sarah Ocampo MD On:5/24/2024 2:06 PM              Assessment and Plan     Diagnoses and all orders for this visit:    1. Right knee pain, unspecified chronicity (Primary)    2. Closed displaced comminuted fracture of right patella, initial encounter        Discussed the treatment plan with the patient. I reviewed the X-rays that were obtained 6/13/24 with the patient.     Discussed the treatment options with the patient, operative vs non-operative. The patient expressed understanding and wished to proceed eduction and fixation of right patella fracture     Prescribed pain medication and walker.      Discussed surgery., Risks/benefits discussed with patient including, but not limited to: infection, bleeding, neurovascular damage, re-rupture, aesthetic deformity, need for further surgery, and death., Discussed with patient the implant type being used during surgery and patient understands., Surgery pamphlet given., Call or return if worsening symptoms., and DME order for a 3 in 1 given today due to patient will be confined to one room/level of the home that does not offer a toilet during postop recovery.     Follow Up       2 weeks postoperatively       Patient was given instructions and counseling regarding her condition or for health maintenance advice. Please see specific information pulled into the AVS if appropriate.     Scribed for Salinas Quintanilla MD by Clara Galdamez MA.  06/14/24   13:39 EDT    I have personally performed the services described in this document as scribed by the above individual and it is both accurate and complete. Salinas Quintanilla MD 06/17/24

## 2024-06-15 ENCOUNTER — ANESTHESIA EVENT CONVERTED (OUTPATIENT)
Dept: ANESTHESIOLOGY | Facility: HOSPITAL | Age: 64
End: 2024-06-15
Payer: COMMERCIAL

## 2024-06-15 ENCOUNTER — ANESTHESIA EVENT (OUTPATIENT)
Dept: PERIOP | Facility: HOSPITAL | Age: 64
End: 2024-06-15
Payer: COMMERCIAL

## 2024-06-15 ENCOUNTER — HOSPITAL ENCOUNTER (OUTPATIENT)
Facility: HOSPITAL | Age: 64
Discharge: HOME | End: 2024-06-15
Attending: ORTHOPAEDIC SURGERY | Admitting: ORTHOPAEDIC SURGERY
Payer: COMMERCIAL

## 2024-06-15 ENCOUNTER — ANESTHESIA (OUTPATIENT)
Dept: PERIOP | Facility: HOSPITAL | Age: 64
End: 2024-06-15
Payer: COMMERCIAL

## 2024-06-15 ENCOUNTER — APPOINTMENT (OUTPATIENT)
Dept: GENERAL RADIOLOGY | Facility: HOSPITAL | Age: 64
End: 2024-06-15
Payer: COMMERCIAL

## 2024-06-15 VITALS
HEART RATE: 86 BPM | HEIGHT: 63 IN | WEIGHT: 151 LBS | DIASTOLIC BLOOD PRESSURE: 75 MMHG | OXYGEN SATURATION: 99 % | BODY MASS INDEX: 26.75 KG/M2 | SYSTOLIC BLOOD PRESSURE: 134 MMHG | TEMPERATURE: 97.9 F | RESPIRATION RATE: 18 BRPM

## 2024-06-15 DIAGNOSIS — S82.041A CLOSED DISPLACED COMMINUTED FRACTURE OF RIGHT PATELLA, INITIAL ENCOUNTER: Primary | ICD-10-CM

## 2024-06-15 LAB — GLUCOSE BLDC GLUCOMTR-MCNC: 106 MG/DL (ref 70–99)

## 2024-06-15 PROCEDURE — 25010000002 MIDAZOLAM PER 1MG: Performed by: ANESTHESIOLOGY

## 2024-06-15 PROCEDURE — 25010000002 CEFAZOLIN PER 500 MG: Performed by: ORTHOPAEDIC SURGERY

## 2024-06-15 PROCEDURE — C1769 GUIDE WIRE: HCPCS | Performed by: ORTHOPAEDIC SURGERY

## 2024-06-15 PROCEDURE — 25010000002 ONDANSETRON PER 1 MG: Performed by: ANESTHESIOLOGY

## 2024-06-15 PROCEDURE — 25010000002 DEXAMETHASONE PER 1 MG: Performed by: ANESTHESIOLOGY

## 2024-06-15 PROCEDURE — 25010000002 FENTANYL CITRATE (PF) 50 MCG/ML SOLUTION: Performed by: ANESTHESIOLOGY

## 2024-06-15 PROCEDURE — C1713 ANCHOR/SCREW BN/BN,TIS/BN: HCPCS | Performed by: ORTHOPAEDIC SURGERY

## 2024-06-15 PROCEDURE — S0260 H&P FOR SURGERY: HCPCS | Performed by: ORTHOPAEDIC SURGERY

## 2024-06-15 PROCEDURE — 25810000003 LACTATED RINGERS PER 1000 ML: Performed by: ANESTHESIOLOGY

## 2024-06-15 PROCEDURE — 25010000002 PROPOFOL 10 MG/ML EMULSION: Performed by: ANESTHESIOLOGY

## 2024-06-15 PROCEDURE — 76000 FLUOROSCOPY <1 HR PHYS/QHP: CPT

## 2024-06-15 PROCEDURE — 82948 REAGENT STRIP/BLOOD GLUCOSE: CPT | Performed by: ANESTHESIOLOGY

## 2024-06-15 PROCEDURE — 27524 TREAT KNEECAP FRACTURE: CPT | Performed by: ORTHOPAEDIC SURGERY

## 2024-06-15 PROCEDURE — 25010000002 PHENYLEPHRINE 10 MG/ML SOLUTION: Performed by: ANESTHESIOLOGY

## 2024-06-15 RX ORDER — BUPIVACAINE HYDROCHLORIDE AND EPINEPHRINE 5; 5 MG/ML; UG/ML
INJECTION, SOLUTION EPIDURAL; INTRACAUDAL; PERINEURAL
Status: COMPLETED | OUTPATIENT
Start: 2024-06-15 | End: 2024-06-15

## 2024-06-15 RX ORDER — ONDANSETRON 2 MG/ML
4 INJECTION INTRAMUSCULAR; INTRAVENOUS ONCE AS NEEDED
Status: DISCONTINUED | OUTPATIENT
Start: 2024-06-15 | End: 2024-06-15 | Stop reason: HOSPADM

## 2024-06-15 RX ORDER — PROMETHAZINE HYDROCHLORIDE 12.5 MG/1
25 TABLET ORAL ONCE AS NEEDED
Status: DISCONTINUED | OUTPATIENT
Start: 2024-06-15 | End: 2024-06-15 | Stop reason: HOSPADM

## 2024-06-15 RX ORDER — MIDAZOLAM HYDROCHLORIDE 2 MG/2ML
2 INJECTION, SOLUTION INTRAMUSCULAR; INTRAVENOUS ONCE AS NEEDED
Status: COMPLETED | OUTPATIENT
Start: 2024-06-15 | End: 2024-06-15

## 2024-06-15 RX ORDER — MIDAZOLAM HYDROCHLORIDE 2 MG/2ML
2 INJECTION, SOLUTION INTRAMUSCULAR; INTRAVENOUS ONCE
Status: COMPLETED | OUTPATIENT
Start: 2024-06-15 | End: 2024-06-15

## 2024-06-15 RX ORDER — ONDANSETRON 2 MG/ML
INJECTION INTRAMUSCULAR; INTRAVENOUS AS NEEDED
Status: DISCONTINUED | OUTPATIENT
Start: 2024-06-15 | End: 2024-06-15 | Stop reason: SURG

## 2024-06-15 RX ORDER — MAGNESIUM HYDROXIDE 1200 MG/15ML
LIQUID ORAL AS NEEDED
Status: DISCONTINUED | OUTPATIENT
Start: 2024-06-15 | End: 2024-06-15 | Stop reason: HOSPADM

## 2024-06-15 RX ORDER — PROPOFOL 10 MG/ML
VIAL (ML) INTRAVENOUS AS NEEDED
Status: DISCONTINUED | OUTPATIENT
Start: 2024-06-15 | End: 2024-06-15 | Stop reason: SURG

## 2024-06-15 RX ORDER — DEXAMETHASONE SODIUM PHOSPHATE 4 MG/ML
INJECTION, SOLUTION INTRA-ARTICULAR; INTRALESIONAL; INTRAMUSCULAR; INTRAVENOUS; SOFT TISSUE AS NEEDED
Status: DISCONTINUED | OUTPATIENT
Start: 2024-06-15 | End: 2024-06-15 | Stop reason: SURG

## 2024-06-15 RX ORDER — OXYCODONE HYDROCHLORIDE 5 MG/1
5 TABLET ORAL
Status: DISCONTINUED | OUTPATIENT
Start: 2024-06-15 | End: 2024-06-15 | Stop reason: HOSPADM

## 2024-06-15 RX ORDER — SODIUM CHLORIDE, SODIUM LACTATE, POTASSIUM CHLORIDE, CALCIUM CHLORIDE 600; 310; 30; 20 MG/100ML; MG/100ML; MG/100ML; MG/100ML
INJECTION, SOLUTION INTRAVENOUS CONTINUOUS PRN
Status: DISCONTINUED | OUTPATIENT
Start: 2024-06-15 | End: 2024-06-15 | Stop reason: SURG

## 2024-06-15 RX ORDER — MEPERIDINE HYDROCHLORIDE 25 MG/ML
12.5 INJECTION INTRAMUSCULAR; INTRAVENOUS; SUBCUTANEOUS
Status: DISCONTINUED | OUTPATIENT
Start: 2024-06-15 | End: 2024-06-15 | Stop reason: HOSPADM

## 2024-06-15 RX ORDER — PROMETHAZINE HYDROCHLORIDE 25 MG/1
25 SUPPOSITORY RECTAL ONCE AS NEEDED
Status: DISCONTINUED | OUTPATIENT
Start: 2024-06-15 | End: 2024-06-15 | Stop reason: HOSPADM

## 2024-06-15 RX ORDER — PHENYLEPHRINE HYDROCHLORIDE 10 MG/ML
INJECTION INTRAVENOUS AS NEEDED
Status: DISCONTINUED | OUTPATIENT
Start: 2024-06-15 | End: 2024-06-15 | Stop reason: SURG

## 2024-06-15 RX ORDER — FENTANYL CITRATE 50 UG/ML
INJECTION, SOLUTION INTRAMUSCULAR; INTRAVENOUS AS NEEDED
Status: DISCONTINUED | OUTPATIENT
Start: 2024-06-15 | End: 2024-06-15 | Stop reason: SURG

## 2024-06-15 RX ADMIN — BUPIVACAINE HYDROCHLORIDE AND EPINEPHRINE BITARTRATE 30 ML: 5; .005 INJECTION, SOLUTION EPIDURAL; INTRACAUDAL; PERINEURAL at 11:21

## 2024-06-15 RX ADMIN — PHENYLEPHRINE HYDROCHLORIDE 200 MCG: 10 INJECTION INTRAVENOUS at 11:54

## 2024-06-15 RX ADMIN — SODIUM CHLORIDE, POTASSIUM CHLORIDE, SODIUM LACTATE AND CALCIUM CHLORIDE: 600; 310; 30; 20 INJECTION, SOLUTION INTRAVENOUS at 11:32

## 2024-06-15 RX ADMIN — MIDAZOLAM HYDROCHLORIDE 2 MG: 1 INJECTION, SOLUTION INTRAMUSCULAR; INTRAVENOUS at 11:14

## 2024-06-15 RX ADMIN — FENTANYL CITRATE 100 MCG: 50 INJECTION, SOLUTION INTRAMUSCULAR; INTRAVENOUS at 11:33

## 2024-06-15 RX ADMIN — PHENYLEPHRINE HYDROCHLORIDE 100 MCG: 10 INJECTION INTRAVENOUS at 11:45

## 2024-06-15 RX ADMIN — MIDAZOLAM HYDROCHLORIDE 2 MG: 1 INJECTION, SOLUTION INTRAMUSCULAR; INTRAVENOUS at 11:15

## 2024-06-15 RX ADMIN — PROPOFOL 200 MG: 10 INJECTION, EMULSION INTRAVENOUS at 11:33

## 2024-06-15 RX ADMIN — ONDANSETRON HYDROCHLORIDE 4 MG: 2 SOLUTION INTRAMUSCULAR; INTRAVENOUS at 12:16

## 2024-06-15 RX ADMIN — SODIUM CHLORIDE 2 G: 9 INJECTION, SOLUTION INTRAVENOUS at 11:36

## 2024-06-15 RX ADMIN — DEXAMETHASONE SODIUM PHOSPHATE 4 MG: 4 INJECTION, SOLUTION INTRAMUSCULAR; INTRAVENOUS at 11:38

## 2024-06-15 NOTE — NURSING NOTE
Patient returned from PACU stable. Patient tolerated food and drink without reporting of any signs of nausea or vomiting. Pain is manageable at this time. Patient was educated on discharge instruction. Patient verbalized understanding and written handout was provided. IV removed and pt is waiting for ride home.

## 2024-06-15 NOTE — H&P
"H safia funk  Divina Virk presents to [facility] for initial evaluation of the right knee. Patient states that she was walking down the steps into a pool when she tripped and fell. She had a fall 24.  She is in a knee immobilizer and has bruising and swelling.      Allergies         Allergies   Allergen Reactions    Adhesive Tape Rash    Bupropion Unknown - Low Severity       Didn't like the way she felt taking            Social History   Social History            Socioeconomic History    Marital status:    Tobacco Use    Smoking status: Former       Current packs/day: 0.00       Average packs/day: 1 pack/day for 35.0 years (35.0 ttl pk-yrs)       Types: Cigarettes       Start date: 1983       Quit date: 2018       Years since quittin.4    Smokeless tobacco: Never   Vaping Use    Vaping status: Never Used   Substance and Sexual Activity    Alcohol use: Never    Drug use: Never    Sexual activity: Defer            I reviewed the patient's chief complaint, history of present illness, review of systems, past medical history, surgical history, family history, social history, medications, and allergy list.      Review of Systems      Constitutional: Denies fevers, chills, weight loss  Cardiovascular: Denies chest pain, shortness of breath  Skin: Denies rashes, acute skin changes  Neurologic: Denies headache, loss of consciousness           Vital Signs:   /68   Pulse 86   Ht 161.3 cm (63.5\")   Wt 68.5 kg (151 lb)   SpO2 95%   BMI 26.33 kg/m²           Physical Exam  General: Alert. No acute distress     Ortho Exam          RIGHT KNEE She is in a knee immobilizer. She has swelling and bruising of the right knee.  Neurovascularly intact.  Positive EHL, FHL, HS and TA. Sensation intact to light touch all 5 nerves of the foot.  Calf supple, non-tender.      Order for a wheelchair given today due to patients mobility limitation impairs ability to " participate in daily ADL's. Mobility limitation cannot be resolved by a cane or walker. Patient can safely and independently maneuver the wheelchair. A caregiver is available if the patient does not have the strength to self propel the wheelchair.        Procedures           Imaging Results (Most Recent)         None                      Result Review  :            XR Knee 3 View Right     Result Date: 6/13/2024  Narrative: XR KNEE 3 VW RIGHT Date of Exam: 6/13/2024 2:56 PM EDT Indication: pain, fall Comparison: None available. Findings: There is a comminuted fracture of the patella. There is distraction of the dominant superior pole fragment. There is no fracture of the distal femur and proximal tibia and fibula. There is no significant joint effusion      Impression: Impression: Comminuted fracture of the patella Electronically Signed: Cr Baltazar  6/13/2024 3:00 PM EDT  Workstation ID: [id number]     XR Hip With or Without Pelvis 2 - 3 View Left     Result Date: 5/24/2024  Narrative: XR HIP W OR WO PELVIS 2-3 VIEW LEFT-  Date of Exam: 5/23/2024 12:54 PM  Indication: left hip pain; M25.552-Pain in left hip  Comparison: None available.  Findings: Bone mineral density is normal. No fractures or dislocations. Moderate degenerative change of the left hip. No aggressive osseous lesions.       Impression: Impression: Moderate arthritis left hip.   Electronically Signed By-Sarah Ocampo MD On:5/24/2024 2:06 PM                  Assessment  Assessment and Plan      Diagnoses and all orders for this visit:     1. Right knee pain, unspecified chronicity (Primary)     2. Closed displaced comminuted fracture of right patella, initial encounter           Discussed the treatment plan with the patient. I reviewed the X-rays that were obtained 6/13/24 with the patient.      Discussed the treatment options with the patient, operative vs non-operative. The patient expressed understanding and wished to proceed eduction and fixation of  right patella fracture      Prescribed pain medication and walker.      Discussed surgery., Risks/benefits discussed with patient including, but not limited to: infection, bleeding, neurovascular damage, re-rupture, aesthetic deformity, need for further surgery, and death., Discussed with patient the implant type being used during surgery and patient understands., Surgery pamphlet given., Call or return if worsening symptoms., and DME order for a 3 in 1 given today due to patient will be confined to one room/level of the home that does not offer a toilet during postop recovery.      Follow Up         2 weeks postoperatively    ,me

## 2024-06-15 NOTE — ANESTHESIA POSTPROCEDURE EVALUATION
Patient: Divina ALVARADO Major    Procedure Summary       Date: 06/15/24 Room / Location: [facility] OR 06 / [facility] MAIN OR    Anesthesia Start: 1133 Anesthesia Stop: 1256    Procedure: PATELLA OPEN REDUCTION INTERNAL FIXATION (Right: Knee) Diagnosis:       Closed displaced comminuted fracture of right patella, initial encounter      (Closed displaced comminuted fracture of right patella, initial encounter [S82.041A])    Surgeons: Salinas Quintanilla MD Provider: Sean Amaya MD    Anesthesia Type: general with block ASA Status: 3            Anesthesia Type: general with block    Vitals  Vitals Value Taken Time   /63 06/15/24 1323   Temp 36.2 °C (97.1 °F) 06/15/24 1300   Pulse 77 06/15/24 1327   Resp 15 06/15/24 1320   SpO2 95 % 06/15/24 1327   Vitals shown include unfiled device data.        Post Anesthesia Care and Evaluation    Patient location during evaluation: bedside  Patient participation: complete - patient participated  Level of consciousness: awake and alert  Pain management: adequate    Airway patency: patent  Anesthetic complications: No anesthetic complications  PONV Status: none  Cardiovascular status: acceptable  Respiratory status: acceptable  Hydration status: acceptable    Comments: An Anesthesiologist personally participated in the most demanding procedures (including induction and emergence if applicable) in the anesthesia plan, monitored the course of anesthesia administration at frequent intervals and remained physically present and available for immediate diagnosis and treatment of emergencies.

## 2024-06-15 NOTE — OP NOTE
PATELLA OPEN REDUCTION INTERNAL FIXATION  Procedure Report    Patient Name:  Divina Virk  YOB: 1960    Date of Surgery:  6/15/2024     Indications: H&P    Pre-op Diagnosis:   Closed displaced comminuted fracture of right patella, initial encounter [S82.041A]       Post-Op Diagnosis Codes:     * Closed displaced comminuted fracture of right patella, initial encounter [S82.041A]    Procedure/CPT® Codes:      Procedure(s):  Right PATELLA OPEN REDUCTION INTERNAL FIXATION          Staff:  Surgeon(s):  Salinas Quintanilla MD    Assistant: Katherine Calvert    Anesthesia: Choice    Estimated Blood Loss: 100 mL    Implants:    Implant Name Type Inv. Item Serial No.  Lot No. LRB No. Used Action   Patella SuturePlate II, Star, Pole Fracture, small     [id number] Right 1 Implanted   SCRW YOKO QUICKFIX CANC PT SHT/THRD 4X40MM - [id number] Implant SCRW YOKO QUICKFIX CANC PT SHT/THRD 4X40MM  [employer]  Right 1 Implanted   SCRW YOKO QUICKFIX CANC PT SHT/THRD 4X42MM - [id number] Implant SCRW YOKO QUICKFIX CANC PT SHT/THRD 4X42MM  [employer]  Right 1 Implanted   GW TROC TP [id number] - [id number] Implant GW TROC TP [id number]  [employer] [id number] Right 1 Implanted       Specimen:          None        Findings: See description    Complications: None    Description of Procedure: Patient was taken operating room placed supine on the operating room table after general tracheal anesthesia established the right knee and lower extremity were prepped and draped in sterile surgical fashion using alcohol ChloraPrep.  A longitudinal incision was made over the knee approximately 6 m length dissection was carried down to the fracture site fracture site was repaired using knife curette and irrigation patella was significantly comminuted with 4 main pieces the joint was copiously irrigated with Simpulse irrigation temporary duction was held with pointed tenaculums and 2 partially-threaded lag screws were eventually placed  across the fracture site using standard technique.  Then a call plate was chosen and fashioned on the patella in appropriate position and there was a hook type plate that was malleted and tamped into position inferiorly and then while holding the plate firmly against the bone locking screws were filled using taking their standard technique as needed in the end the C-arm shots revealed near anatomic alignment of the fracture no complications from the implant.  The wound was copiously irrigated and the subcu was closed with 2-0 Vicryl deep tissue 0 Vicryl subcu 2-0 Vicryl the skin was closed with staples                Salinas Quintanilla MD     Date: 6/15/2024  Time: 12:56 EDT

## 2024-06-15 NOTE — ANESTHESIA PROCEDURE NOTES
Peripheral Block      Patient reassessed immediately prior to procedure    Patient location during procedure: pre-op  Start time: 6/15/2024 11:18 AM  Stop time: 6/15/2024 11:20 AM  Reason for block: at surgeon's request and post-op pain management  Preanesthetic Checklist  Completed: patient identified, IV checked, site marked, risks and benefits discussed, surgical consent, monitors and equipment checked, pre-op evaluation and timeout performed  Prep:  Pt Position: supine  Sterile barriers:alcohol skin prep, cap, partial drape, washed/disinfected hands, gloves and mask  Prep: ChloraPrep  Patient monitoring: blood pressure monitoring, continuous pulse oximetry and EKG  Procedure    Sedation: yes  Performed under: local infiltration  Guidance:ultrasound guided and nerve stimulator    ULTRASOUND INTERPRETATION.  Using ultrasound guidance a 20 G gauge needle was placed in close proximity to the femoral nerve, at which point, under ultrasound guidance anesthetic was injected in the area of the nerve and spread of the anesthesia was seen on ultrasound in close proximity thereto.  There were no abnormalities seen on ultrasound; a digital image was taken; and the patient tolerated the procedure with no complications. Images:still images obtained, printed/placed on chart    Laterality:right  Block Type:femoral  Injection Technique:single-shot  Needle Type:echogenic  Needle Gauge:20 G (4in)  Resistance on Injection: none    Medications Used: bupivacaine-EPINEPHrine PF (MARCAINE w/EPI) 0.5% -1:729621 injection - Injection   30 mL - 6/15/2024 11:21:00 AM      Post Assessment  Injection Assessment: negative aspiration for heme, no paresthesia on injection and incremental injection  Patient Tolerance:comfortable throughout block  Complications:no  Additional Notes  The block or continuous infusion is requested by the referring physician for management of postoperative pain, or pain related to a procedure. Ultrasound guidance  (deemed medically necessary). Painless injection, pt was awake and conversant during the procedure without complications. Needle and surrounding structures visualized throughout procedure. No adverse reactions or complications seen during this period. Post-procedure image showed no signs of complication, and anatomy was consistent with an uncomplicated nerve blockade.  Performed by: eSan Amaya MD

## 2024-06-18 DIAGNOSIS — S82.041A CLOSED DISPLACED COMMINUTED FRACTURE OF RIGHT PATELLA, INITIAL ENCOUNTER: Primary | ICD-10-CM

## 2024-07-01 ENCOUNTER — OFFICE VISIT (OUTPATIENT)
Dept: ORTHOPEDIC SURGERY | Facility: CLINIC | Age: 64
End: 2024-07-01
Payer: COMMERCIAL

## 2024-07-01 VITALS
SYSTOLIC BLOOD PRESSURE: 141 MMHG | HEART RATE: 89 BPM | BODY MASS INDEX: 26.75 KG/M2 | WEIGHT: 151 LBS | DIASTOLIC BLOOD PRESSURE: 83 MMHG | HEIGHT: 63 IN | OXYGEN SATURATION: 98 %

## 2024-07-01 DIAGNOSIS — Z87.81 S/P ORIF (OPEN REDUCTION INTERNAL FIXATION) FRACTURE: ICD-10-CM

## 2024-07-01 DIAGNOSIS — Z98.890 S/P ORIF (OPEN REDUCTION INTERNAL FIXATION) FRACTURE: ICD-10-CM

## 2024-07-01 DIAGNOSIS — S82.041D CLOSED DISPLACED COMMINUTED FRACTURE OF RIGHT PATELLA WITH ROUTINE HEALING, SUBSEQUENT ENCOUNTER: Primary | ICD-10-CM

## 2024-07-01 PROCEDURE — 99024 POSTOP FOLLOW-UP VISIT: CPT

## 2024-07-01 NOTE — PROGRESS NOTES
"Chief Complaint  Follow-up of the Right Knee    Subjective      Divinayolanda Virk presents to [facility] for follow up of the right knee. She is here for a 2 week follow up of open reduction and fixation of the right knee on 6/15/24 by Dr Quintanilla. She is here non weight bearing in a knee immobilizer and with use of a walker. Her sutures were removed today and steri strips applied.  She has mild swelling.      Allergies   Allergen Reactions    Adhesive Tape Rash    Bupropion Unknown - Low Severity     Didn't like the way she felt taking       Objective     Vital Signs:   Vitals:    07/01/24 0909   BP: 141/83   Pulse: 89   SpO2: 98%   Weight: 68.5 kg (151 lb)   Height: 160 cm (63\")     Body mass index is 26.75 kg/m².    I reviewed the patient's chief complaint, history of present illness, review of systems, past medical history, surgical history, family history, social history, medications, and allergy list.     REVIEW OF SYSTEMS    Constitutional: Denies fevers, chills, weight loss  Cardiovascular: Denies chest pain, shortness of breath  Skin: Denies rashes, acute skin changes  Neurologic: Denies headache, loss of consciousness  MSK: Right knee pain.      Ortho Exam  Knee   General: Alert, no acute distress.   Right Knee: No pain with passive hip ROM. Sutures removed today in office without complication. Steri-strips applied, incision is clean, dry and intact, Mild swelling, redness or drainage.  Knee stable to varus/valgus stress.  Knee extensor mechanism intact. -3 knee extension. Flexion 30. Calf soft, non-tender.  Sensation and neurovascularly intact.  Demonstrates active ankle dorsiflexion and plantarflexion.  Palpable pedal pulses.               Imaging Results (Most Recent)       Procedure Component Value Units Date/Time    XR Knee 1 or 2 View Right [[id number]] Resulted: 07/01/24 0931     Updated: 07/01/24 0933    Narrative:      X-Ray Report:  Study: X-rays ordered, taken in the " office, and reviewed today  Site: Right knee xray  Indication: Right patella ORIF follow-up  View: AP, lateral, right knee  Findings: Comminuted fracture of the patella is seen with stable   alignment. Right patella ORIF intact without evidence of hardware   complications or loosening.  Prior studies available for comparison: yes                 Assessment and Plan   Diagnoses and all orders for this visit:    1. Closed displaced comminuted fracture of right patella with routine healing, subsequent encounter (Primary)  -     XR Knee 1 or 2 View Right  -     Ambulatory Referral to Physical Therapy  -     Miscellaneous DME    2. S/P ORIF (open reduction internal fixation) fracture  -     Ambulatory Referral to Physical Therapy  -     Miscellaneous DME         Divina Virk presents to [facility] She is here for a 2 week follow up of open reduction and fixation of the right knee on 6/15/24 by Dr Quintanilla. Her sutures were removed today and steri strips applied.     I reviewed the X-rays that were obtained today with the patient.     Hinged brace given and locked at 0-30. Sleep with the brace on.  Ice and elevate to decrease swelling.     Prescribed physical therapy. WBAT.     Shower only.  No soaking in water. No lotion on incision.       Tobacco Use: Medium Risk (6/15/2024)    Patient History     Smoking Tobacco Use: Former     Smokeless Tobacco Use: Never     Passive Exposure: Not on file     Patient reports that they are a nonsmoker; cessation education not applicable.            Follow Up   Return in about 3 weeks (around 7/22/2024).  There are no Patient Instructions on file for this visit.  Patient was given instructions and counseling regarding her condition or for health maintenance advice. Please see specific information pulled into the AVS if appropriate.     Scribed by Clara Galdamez MA    Dictated Utilizing Dragon Dictation. Please note that portions of this note were completed  with a voice recognition program. Part of this note may be an electronic transcription/translation of spoken language to printed text using the Dragon Dictation System.

## 2024-07-10 ENCOUNTER — TREATMENT (OUTPATIENT)
Dept: PHYSICAL THERAPY | Facility: CLINIC | Age: 64
End: 2024-07-10
Payer: COMMERCIAL

## 2024-07-10 DIAGNOSIS — R26.9 GAIT DISTURBANCE: ICD-10-CM

## 2024-07-10 DIAGNOSIS — Z98.890 S/P ORIF (OPEN REDUCTION INTERNAL FIXATION) FRACTURE: ICD-10-CM

## 2024-07-10 DIAGNOSIS — M25.561 RIGHT KNEE PAIN, UNSPECIFIED CHRONICITY: ICD-10-CM

## 2024-07-10 DIAGNOSIS — S82.041D CLOSED DISPLACED COMMINUTED FRACTURE OF RIGHT PATELLA WITH ROUTINE HEALING: Primary | ICD-10-CM

## 2024-07-10 DIAGNOSIS — Z87.81 S/P ORIF (OPEN REDUCTION INTERNAL FIXATION) FRACTURE: ICD-10-CM

## 2024-07-10 NOTE — PROGRESS NOTES
Physical Therapy Initial Evaluation and Plan of Care  [address] [address]      Patient: Divina Virk   : 1960  Diagnosis/ICD-10 Code:  Closed displaced comminuted fracture of right patella with routine healing [S82.041D]  Referring practitioner: NADINE Varghese  Date of Initial Visit: 7/10/2024  Today's Date: 7/10/2024  Patient seen for 1 sessions           Subjective Questionnaire: LEFS: =79% limitation      Subjective Evaluation    History of Present Illness  Mechanism of injury: Patient is a 63 yr old female referred to physical therapy S/p R knee ORIF 6/15/24. Patient using front wheeled walker and hinged knee brace (0-30 degrees) WBAT R LE.  Patient was independent with ambulation. Patient currently off work secondary to s/p right knee surgery.       Pain  At best pain ratin  At worst pain ratin  Location: right knee  Quality: dull ache and burning  Relieving factors: change in position, relaxation, rest and ice    Patient Goals  Patient goals for therapy: improved balance, increased motion, decreased pain, decreased edema, increased strength, independence with ADLs/IADLs, return to sport/leisure activities and return to work           Objective          Active Range of Motion     Right Knee   Flexion: 40 degrees   Extension: 0 degrees     Strength/Myotome Testing     Right Knee   Flexion: 2  Extension: 2  Quadriceps contraction: fair    Right Knee Flexibility Comments:   Right hamstring/gastroc tightness        Assessment & Plan       Assessment  Impairments: abnormal gait, abnormal or restricted ROM, activity intolerance, impaired balance, impaired physical strength, lacks appropriate home exercise program, pain with function, safety issue and weight-bearing intolerance   Functional limitations: walking, uncomfortable because of pain, sitting, standing, stooping and unable to perform repetitive tasks   Assessment details: Pt presents with limitations,  noted by evaluation that impede patient's ability to ambulate/functional mobility.  The skills of a therapist will be required to safely and effectively implement the following treatment plan to restore maximal level of function.      Prognosis: good    Goals  Plan Goals: 1. The patient has limited ROM of the right knee.   LTG 1: 12 weeks:  The patient will demonstrate 0 to 120 degrees of ROM for the right knee in order to allow patient to normalize gait.   STATUS:  New   STG 1a: 6 weeks:  The patient will demonstrate 0 to 90 degrees of ROM for the right knee.   STATUS:  New      2. The patient has limited strength of the right knee.   LTG 2: 12 weeks: The patient will demonstrate 4/5 strength for right knee flexion and extension in order to allow patient improved joint stability.   STATUS:  New   STG 2a: 6 weeks: The patient will demonstrate 3/5 strength for right knee flexion and extension   STATUS:  New       3. The patient has gait dysfunction.   LTG 3: 12 weeks:  The patient will ambulate without assistive device, independently, for community distances with minimal limp to the right lower extremity in order to improve mobility and allow patient to perform activities such as grocery shopping with greater ease.   STATUS:  New   STG 3a: The patient will be independent in HEP.   STATUS:  New      4. The patient complains of right knee pain.   LTG 4: 12 weeks:  The patient will report a pain rating of 2/10 or better in order to improve tolerance to activities of daily living and improve sleep quality.   STATUS:  New   STG 4a: 6weeks:  The patient will report a pain rating of 4/10 or better.   STATUS:  New      5. Mobility: Walking/Moving Around Functional Limitation     LTG 5: 12 weeks:  The patient will demonstrate 25% limitation by achieving a score of 60/80 on the Lower Extremity Functional Scale.   STATUS:  New   STG 5a: 6 weeks:  The patient will demonstrate 50% limitation by achieving a score of 40/80 on the  Lower Extremity Functional Scale.     STATUS:  New         Plan  Therapy options: will be seen for skilled therapy services  Planned modality interventions: cryotherapy and TENS  Planned therapy interventions: manual therapy, soft tissue mobilization, strengthening, stretching, therapeutic activities, joint mobilization, home exercise program, functional ROM exercises, flexibility and balance/weight-bearing training  Frequency: 2x week  Duration in weeks: 12  Treatment plan discussed with: patient  Plan details: Will start 1xwk then progress to 2xwk as appropriate  History # of Personal Factors and/or Comorbidities: LOW (0)  Examination of Body System(s): # of elements: LOW (1-2)  Clinical Presentation: STABLE   Clinical Decision Making: LOW       Visit Diagnoses:    ICD-10-CM ICD-9-CM   1. Closed displaced comminuted fracture of right patella with routine healing  S82.041D V54.16   2. S/P ORIF (open reduction internal fixation) fracture  Z98.890 V45.89    Z87.81 V15.51   3. Right knee pain, unspecified chronicity  M25.561 719.46   4. Gait disturbance  R26.9 781.2       Timed:  Manual Therapy:         mins  28047;  Therapeutic Exercise:    12     mins  81763;     Neuromuscular Warren:        mins  18816;    Therapeutic Activity:          mins  70082;     Gait Training:           mins  20450;     Ultrasound:          mins  14696;    Electrical Stimulation:         mins  50833 ( );    Untimed:  Electrical Stimulation:         mins  85002 ( );  Mechanical Traction:         mins  78366;    PT evaluation     Low Eval                        28   Mins  71599  Mod Eval                             Mins  18927  High Eval                           Mins  89894    Timed Treatment:   12   mins   Total Treatment:     40   mins    PT SIGNATURE: Jocelin Pizano PT     Electronically singed 7/10/2024    [place] PT license: [id number]        Initial Certification  Certification Period: 7/10/2024 thru 10/7/2024  I certify that  the therapy services are furnished while this patient is under my care.  The services outlined above are required by this patient, and will be reviewed every 90 days.    PHYSICIAN: Judy Feliciano APRN  NPI: [id number]                                      DATE:     Please sign and return via fax to [phone]  Thank you, [facility].

## 2024-07-16 ENCOUNTER — TREATMENT (OUTPATIENT)
Dept: PHYSICAL THERAPY | Facility: CLINIC | Age: 64
End: 2024-07-16
Payer: COMMERCIAL

## 2024-07-16 ENCOUNTER — TELEPHONE (OUTPATIENT)
Dept: ORTHOPEDIC SURGERY | Facility: CLINIC | Age: 64
End: 2024-07-16
Payer: COMMERCIAL

## 2024-07-16 DIAGNOSIS — R26.9 GAIT DISTURBANCE: ICD-10-CM

## 2024-07-16 DIAGNOSIS — S82.041D CLOSED DISPLACED COMMINUTED FRACTURE OF RIGHT PATELLA WITH ROUTINE HEALING: Primary | ICD-10-CM

## 2024-07-16 DIAGNOSIS — M25.561 RIGHT KNEE PAIN, UNSPECIFIED CHRONICITY: ICD-10-CM

## 2024-07-16 DIAGNOSIS — Z98.890 S/P ORIF (OPEN REDUCTION INTERNAL FIXATION) FRACTURE: ICD-10-CM

## 2024-07-16 DIAGNOSIS — Z87.81 S/P ORIF (OPEN REDUCTION INTERNAL FIXATION) FRACTURE: ICD-10-CM

## 2024-07-16 NOTE — PROGRESS NOTES
Physical Therapy Daily Treatment Note      Patient: Divina Virk   : 1960  Referring practitioner: NADINE Varghese  Date of Initial Visit: Type: THERAPY  Noted: 7/10/2024  Today's Date: 2024  Patient seen for 2 sessions           Subjective   Divina Virk reports: no right knee pain; but having left hip discomfort secondary to ambulating with right knee brace.       Objective   See Exercise, Manual, and Modality Logs for complete treatment.       Assessment/Plan    Visit Diagnoses:    ICD-10-CM ICD-9-CM   1. Closed displaced comminuted fracture of right patella with routine healing  S82.041D V54.16   2. S/P ORIF (open reduction internal fixation) fracture  Z98.890 V45.89    Z87.81 V15.51   3. Right knee pain, unspecified chronicity  M25.561 719.46   4. Gait disturbance  R26.9 781.2       Progress per Plan of Care           Timed:  Manual Therapy:    10     mins  82187;  Therapeutic Exercise:    20     mins  94586;     Neuromuscular Warren:        mins  51809;    Therapeutic Activity:     8     mins  97255;     Gait Training:           mins  62459;     Ultrasound:          mins  23572;    Electrical Stimulation:         mins  45352 ( );    Untimed:  Electrical Stimulation:         mins  41114 ( );  Mechanical Traction:         mins  50112;     Timed Treatment:   38   mins   Total Treatment:     38   mins  Jocelin Pizano PT    Electronically singed 2024      [place] PT license: [id number]  Physical Therapist

## 2024-07-16 NOTE — TELEPHONE ENCOUNTER
Provider: JAX    Caller: KEIRA    Relationship to Patient: SELF    PhOne Number: [phone]    Reason for Call: PT CALLED AND STATED HAS A FX AND NEEDS TO BE SEEN SAME WEEK AS SHE HAD AN APPT FOR JESSICA YOOS AND HAVE HER BRACE LOOSENED - PLEASE CALL BACK TO [facility] HAS NO AVAILABILITY

## 2024-07-22 ENCOUNTER — OFFICE VISIT (OUTPATIENT)
Dept: ORTHOPEDIC SURGERY | Facility: CLINIC | Age: 64
End: 2024-07-22
Payer: COMMERCIAL

## 2024-07-22 VITALS
WEIGHT: 151.01 LBS | SYSTOLIC BLOOD PRESSURE: 124 MMHG | OXYGEN SATURATION: 93 % | HEART RATE: 76 BPM | BODY MASS INDEX: 26.76 KG/M2 | DIASTOLIC BLOOD PRESSURE: 63 MMHG | HEIGHT: 63 IN

## 2024-07-22 DIAGNOSIS — Z98.890 S/P ORIF (OPEN REDUCTION INTERNAL FIXATION) FRACTURE: ICD-10-CM

## 2024-07-22 DIAGNOSIS — S82.041D CLOSED DISPLACED COMMINUTED FRACTURE OF RIGHT PATELLA WITH ROUTINE HEALING, SUBSEQUENT ENCOUNTER: Primary | ICD-10-CM

## 2024-07-22 DIAGNOSIS — Z98.890 S/P ORIF (OPEN REDUCTION INTERNAL FIXATION) FRACTURE: Primary | ICD-10-CM

## 2024-07-22 DIAGNOSIS — Z87.81 S/P ORIF (OPEN REDUCTION INTERNAL FIXATION) FRACTURE: ICD-10-CM

## 2024-07-22 DIAGNOSIS — Z87.81 S/P ORIF (OPEN REDUCTION INTERNAL FIXATION) FRACTURE: Primary | ICD-10-CM

## 2024-07-22 DIAGNOSIS — M25.561 RIGHT KNEE PAIN, UNSPECIFIED CHRONICITY: ICD-10-CM

## 2024-07-22 PROCEDURE — 99024 POSTOP FOLLOW-UP VISIT: CPT

## 2024-07-22 RX ORDER — HYDROCODONE BITARTRATE AND ACETAMINOPHEN 5; 325 MG/1; MG/1
1 TABLET ORAL EVERY 4 HOURS PRN
Qty: 42 TABLET | Refills: 0 | Status: SHIPPED | OUTPATIENT
Start: 2024-07-22

## 2024-07-22 NOTE — PROGRESS NOTES
"Chief Complaint  Pain and Follow-up of the Right Knee    Subjective      Divina Virk presents to [facility] for follow up of her right knee.  Patient is 5 weeks status post open reduction internal fixation of the right patella performed Dr. Quintanilla on 6/15/2024.  Patient was last seen in office on 7/1/2024 and advised to remain in hinged brace, weight-bear as tolerated and begin physical therapy.  Patient arrives today without the use of assistive devices.  She states overall she is doing good.  She has been icing frequently.  She is wearing her hinged brace as instructed.  She states with physical therapy she has quite a bit of discomfort but she does ice frequently after that.  She is attending physical therapy at [facility] in [place].    Allergies   Allergen Reactions    Adhesive Tape Rash    Bupropion Unknown - Low Severity     Didn't like the way she felt taking       Objective     Vital Signs:   Vitals:    07/22/24 1543   BP: 124/63   Pulse: 76   SpO2: 93%   Weight: 68.5 kg (151 lb 0.2 oz)   Height: 160 cm (63\")     Body mass index is 26.75 kg/m².    I reviewed the patient's chief complaint, history of present illness, review of systems, past medical history, surgical history, family history, social history, medications, and allergy list.     REVIEW OF SYSTEMS    Constitutional: Denies fevers, chills, weight loss  Cardiovascular: Denies chest pain, shortness of breath  Skin: Denies rashes, acute skin changes  Neurologic: Denies headache, loss of consciousness  MSK: Right knee pain.     Ortho Exam  Knee   General: Alert, no acute distress.   Right Knee: No pain with passive hip ROM.  Incision well-healing.  Patient placed butterfly Steri-Strips over incision.  No evidence of infection, redness or drainage.  Knee stable to varus/valgus stress.  Knee extensor mechanism intact. 0 degrees knee extension. Passive flexion to 60 degrees. Active flexion 40 degrees. " Calf soft, non-tender.  Sensation and neurovascularly intact.  Demonstrates active ankle dorsiflexion and plantarflexion.  Palpable pedal pulses.               Imaging Results (Most Recent)       None                Assessment and Plan   Diagnoses and all orders for this visit:    1. Closed displaced comminuted fracture of right patella with routine healing, subsequent encounter (Primary)    2. S/P ORIF (open reduction internal fixation) fracture    3. Right knee pain, unspecified chronicity         Divina F Major presents to [facility] for her right patella.  Patient is 5 weeks status post open reduction internal fixation of the right patella fracture performed Dr. Quintanilla on 6/15/2024.  Incision site is well-healing.  Continue to avoid soaking in submerging incision in water until further healing is evident.  No signs and symptoms of infection.  Discussed signs and symptoms of infection and when to contact the office.  Continue to ice frequently.  Continue hinged knee brace at all times.  Hinged knee brace was unlocked from 0 to 60 degrees.  Continue to weight-bear as tolerated.    Follow-up in 3 weeks.  Will obtain x-rays of the right knee at that time.  Plan to unlocked from 0 to 90 degrees hinged knee brace at that time.      Tobacco Use: Medium Risk (7/22/2024)    Patient History     Smoking Tobacco Use: Former     Smokeless Tobacco Use: Never     Passive Exposure: Not on file     Patient reports they have a history of tobacco use; encouraged continued tobacco cessation for further health benefits.       Follow Up   Return in about 3 weeks (around 8/12/2024).  There are no Patient Instructions on file for this visit.  Patient was given instructions and counseling regarding her condition or for health maintenance advice. Please see specific information pulled into the AVS if appropriate.       Dictated Utilizing Dragon Dictation. Please note that portions of this note were completed  with a voice recognition program. Part of this note may be an electronic transcription/translation of spoken language to printed text using the Dragon Dictation System.

## 2024-07-25 ENCOUNTER — TREATMENT (OUTPATIENT)
Dept: PHYSICAL THERAPY | Facility: CLINIC | Age: 64
End: 2024-07-25
Payer: COMMERCIAL

## 2024-07-25 DIAGNOSIS — M25.561 RIGHT KNEE PAIN, UNSPECIFIED CHRONICITY: ICD-10-CM

## 2024-07-25 DIAGNOSIS — R26.9 GAIT DISTURBANCE: ICD-10-CM

## 2024-07-25 DIAGNOSIS — S82.041D CLOSED DISPLACED COMMINUTED FRACTURE OF RIGHT PATELLA WITH ROUTINE HEALING: Primary | ICD-10-CM

## 2024-07-25 DIAGNOSIS — Z98.890 S/P ORIF (OPEN REDUCTION INTERNAL FIXATION) FRACTURE: ICD-10-CM

## 2024-07-25 DIAGNOSIS — Z87.81 S/P ORIF (OPEN REDUCTION INTERNAL FIXATION) FRACTURE: ICD-10-CM

## 2024-07-25 NOTE — PROGRESS NOTES
Physical Therapy Daily Treatment Note      Patient: Divina Virk   : 1960  Referring practitioner: NADINE Varghese  Date of Initial Visit: Type: THERAPY  Noted: 7/10/2024  Today's Date: 2024  Patient seen for 3 sessions           Subjective   Divina Virk reports: right knee doing well; but left hip pain 7/10 secondary to antalgic gait with right knee in brace 0-60      Objective   See Exercise, Manual, and Modality Logs for complete treatment.       Assessment & Plan       Assessment  Assessment details: Patient continues to progress toward goals; added [place] ESTIM for right quad strengthening secondary to patient states Ortho is getting her a home unit, but have not received it yet.     Visit Diagnoses:    ICD-10-CM ICD-9-CM   1. Closed displaced comminuted fracture of right patella with routine healing  S82.041D V54.16   2. S/P ORIF (open reduction internal fixation) fracture  Z98.890 V45.89    Z87.81 V15.51   3. Right knee pain, unspecified chronicity  M25.561 719.46   4. Gait disturbance  R26.9 781.2       Progress per Plan of Care           Timed:  Manual Therapy:    5    mins  01028;  Therapeutic Exercise:    26     mins  70181;     Neuromuscular Warren:        mins  30221;    Therapeutic Activity:     10     mins  82624;     Gait Training:           mins  42707;     Ultrasound:          mins  16438;    Electrical Stimulation:         mins  72306 ( );    Untimed:  Electrical Stimulation:         mins  79761 ( );  Mechanical Traction:         mins  05141;     Timed Treatment:   41 mins   Total Treatment:     41  mins  Jocelin Pizano PT    Electronically singed 2024      [place] PT license: [id number]  Physical Therapist

## 2024-07-31 ENCOUNTER — TREATMENT (OUTPATIENT)
Dept: PHYSICAL THERAPY | Facility: CLINIC | Age: 64
End: 2024-07-31
Payer: COMMERCIAL

## 2024-07-31 DIAGNOSIS — M25.561 RIGHT KNEE PAIN, UNSPECIFIED CHRONICITY: ICD-10-CM

## 2024-07-31 DIAGNOSIS — S82.041D CLOSED DISPLACED COMMINUTED FRACTURE OF RIGHT PATELLA WITH ROUTINE HEALING: Primary | ICD-10-CM

## 2024-07-31 DIAGNOSIS — R26.9 GAIT DISTURBANCE: ICD-10-CM

## 2024-07-31 DIAGNOSIS — Z87.81 S/P ORIF (OPEN REDUCTION INTERNAL FIXATION) FRACTURE: ICD-10-CM

## 2024-07-31 DIAGNOSIS — Z98.890 S/P ORIF (OPEN REDUCTION INTERNAL FIXATION) FRACTURE: ICD-10-CM

## 2024-07-31 NOTE — PROGRESS NOTES
Physical Therapy Daily Treatment Note      Patient: Divina Virk   : 1960  Referring practitioner: NADINE Varghese  Date of Initial Visit: Type: THERAPY  Noted: 7/10/2024  Today's Date: 2024  Patient seen for 4 sessions           Subjective   Divina Virk reports: right knee doing well, but still having left hip pain       Objective   See Exercise, Manual, and Modality Logs for complete treatment.       Assessment & Plan       Assessment  Assessment details: Patient continues to progress toward goals.     Visit Diagnoses:    ICD-10-CM ICD-9-CM   1. Closed displaced comminuted fracture of right patella with routine healing  S82.041D V54.16   2. S/P ORIF (open reduction internal fixation) fracture  Z98.890 V45.89    Z87.81 V15.51   3. Right knee pain, unspecified chronicity  M25.561 719.46   4. Gait disturbance  R26.9 781.2       Progress per Plan of Care           Timed:  Manual Therapy:    8     mins  16052;  Therapeutic Exercise:    22     mins  11634;     Neuromuscular Warren:        mins  64928;    Therapeutic Activity:     14     mins  93614;     Gait Training:           mins  86390;     Ultrasound:          mins  98016;    Electrical Stimulation:         mins  94649 ( );    Untimed:  Electrical Stimulation:         mins  95027 ( );  Mechanical Traction:         mins  91783;     Timed Treatment:   44   mins   Total Treatment:     44   mins  Jocelin Pizano PT    Electronically singed 2024      [place] PT license: [id number]  Physical Therapist

## 2024-08-07 ENCOUNTER — TREATMENT (OUTPATIENT)
Dept: PHYSICAL THERAPY | Facility: CLINIC | Age: 64
End: 2024-08-07
Payer: COMMERCIAL

## 2024-08-07 DIAGNOSIS — Z87.81 S/P ORIF (OPEN REDUCTION INTERNAL FIXATION) FRACTURE: ICD-10-CM

## 2024-08-07 DIAGNOSIS — Z98.890 S/P ORIF (OPEN REDUCTION INTERNAL FIXATION) FRACTURE: ICD-10-CM

## 2024-08-07 DIAGNOSIS — R26.9 GAIT DISTURBANCE: ICD-10-CM

## 2024-08-07 DIAGNOSIS — M25.561 RIGHT KNEE PAIN, UNSPECIFIED CHRONICITY: ICD-10-CM

## 2024-08-07 DIAGNOSIS — S82.041D CLOSED DISPLACED COMMINUTED FRACTURE OF RIGHT PATELLA WITH ROUTINE HEALING: Primary | ICD-10-CM

## 2024-08-07 NOTE — PROGRESS NOTES
Physical Therapy Daily Treatment Note      Patient: Divina Virk   : 1960  Referring practitioner: NADINE Varghese  Date of Initial Visit: Type: THERAPY  Noted: 7/10/2024  Today's Date: 2024  Patient seen for 5 sessions           Subjective   Divina Virk reports: patient reports only having pain with end range of right knee flexion      Objective   See Exercise, Manual, and Modality Logs for complete treatment.       Assessment & Plan       Assessment  Assessment details: Patient continues to progress toward goals; still demonstrating right quad weakness.     Visit Diagnoses:    ICD-10-CM ICD-9-CM   1. Closed displaced comminuted fracture of right patella with routine healing  S82.041D V54.16   2. S/P ORIF (open reduction internal fixation) fracture  Z98.890 V45.89    Z87.81 V15.51   3. Right knee pain, unspecified chronicity  M25.561 719.46   4. Gait disturbance  R26.9 781.2       Progress per Plan of Care and Progress strengthening /stabilization /functional activity           Timed:  Manual Therapy:         mins  17697;  Therapeutic Exercise:    28     mins  82420;     Neuromuscular Warren:        mins  70531;    Therapeutic Activity:     10     mins  15733;     Gait Training:           mins  44264;     Ultrasound:          mins  44512;    Electrical Stimulation:         mins  51112 ( );    Untimed:  Electrical Stimulation:         mins  14711 ( );  Mechanical Traction:         mins  23093;     Timed Treatment:   38   mins   Total Treatment:     38   mins  Jocelin Pizano, PT    Electronically singed 2024      [place] PT license: [id number]  Physical Therapist

## 2024-08-14 ENCOUNTER — TREATMENT (OUTPATIENT)
Dept: PHYSICAL THERAPY | Facility: CLINIC | Age: 64
End: 2024-08-14
Payer: COMMERCIAL

## 2024-08-14 DIAGNOSIS — Z98.890 S/P ORIF (OPEN REDUCTION INTERNAL FIXATION) FRACTURE: ICD-10-CM

## 2024-08-14 DIAGNOSIS — S82.041D CLOSED DISPLACED COMMINUTED FRACTURE OF RIGHT PATELLA WITH ROUTINE HEALING: Primary | ICD-10-CM

## 2024-08-14 DIAGNOSIS — M25.561 RIGHT KNEE PAIN, UNSPECIFIED CHRONICITY: ICD-10-CM

## 2024-08-14 DIAGNOSIS — R26.9 GAIT DISTURBANCE: ICD-10-CM

## 2024-08-14 DIAGNOSIS — Z87.81 S/P ORIF (OPEN REDUCTION INTERNAL FIXATION) FRACTURE: ICD-10-CM

## 2024-08-14 NOTE — PROGRESS NOTES
"Physical Therapy Progress Note  [address] [facility], [address], [place] [address]      Patient: Divina Virk   : 1960  Referring practitioner: NADINE Varghese  Date of Initial Visit: Type: THERAPY  Noted: 7/10/2024  Today's Date: 2024  Patient seen for 6 sessions           Subjective   Divina Major reports: right knee anterior scar \"burning\" 1-2/10 pain rating  Subjective Questionnaire: LEFS: 36/80=55% limitation improved from prior score17/80=79% limitation       Objective          Active Range of Motion     Right Knee   Flexion: 60 degrees   Extension: 0 degrees     Strength/Myotome Testing     Right Knee   Flexion: 3-  Extension: 3-  Quadriceps contraction: fair    Right Knee Flexibility Comments:   Right hamstring/gastroc tightness      See Exercise, Manual, and Modality Logs for complete treatment.       Assessment & Plan       Assessment  Impairments: abnormal gait, abnormal or restricted ROM, activity intolerance, impaired balance, impaired physical strength, lacks appropriate home exercise program, pain with function, safety issue and weight-bearing intolerance   Functional limitations: walking, uncomfortable because of pain, sitting, standing, stooping and unable to perform repetitive tasks   Assessment details: Patient making good progress per protocol. Patient still has deficits of right knee range of motion and weakness. Patient would benefit from continued skilled physical therapy for improved functional mobility (progressing per limitations of protocol)  Prognosis: good    Goals  Plan Goals: 1. The patient has limited ROM of the right knee.   LTG 1: 12 weeks:  The patient will demonstrate 0 to 120 degrees of ROM for the right knee in order to allow patient to normalize gait.   STATUS:  ongoing  STG 1a: 6 weeks:  The patient will demonstrate 0 to 90 degrees of ROM for the right knee.   STATUS:  progressing     2. The patient has limited strength of the right knee.   LTG 2: 12 weeks: " The patient will demonstrate 4/5 strength for right knee flexion and extension in order to allow patient improved joint stability.   STATUS:  ongoing  STG 2a: 6 weeks: The patient will demonstrate 3/5 strength for right knee flexion and extension   STATUS: progressing     3. The patient has gait dysfunction.   LTG 3: 12 weeks:  The patient will ambulate without assistive device, independently, for community distances with minimal limp to the right lower extremity in order to improve mobility and allow patient to perform activities such as grocery shopping with greater ease.   STATUS:  ongoing  STG 3a: The patient will be independent in HEP.   STATUS:  Met and progressing as appropriate     4. The patient complains of right knee pain.   LTG 4: 12 weeks:  The patient will report a pain rating of 2/10 or better in order to improve tolerance to activities of daily living and improve sleep quality.   STATUS:  ongoing  STG 4a: 6weeks:  The patient will report a pain rating of 4/10 or better.   STATUS:  Met     5. Mobility: Walking/Moving Around Functional Limitation     LTG 5: 12 weeks:  The patient will demonstrate 25% limitation by achieving a score of 60/80 on the Lower Extremity Functional Scale.   STATUS:  ongoing  STG 5a: 6 weeks:  The patient will demonstrate 50% limitation by achieving a score of 40/80 on the Lower Extremity Functional Scale.     STATUS:  progressing         Plan  Therapy options: will be seen for skilled therapy services  Planned modality interventions: cryotherapy and TENS  Planned therapy interventions: manual therapy, soft tissue mobilization, strengthening, stretching, therapeutic activities, joint mobilization, home exercise program, functional ROM exercises, flexibility and balance/weight-bearing training  Frequency: 2x week  Duration in weeks: 8  Treatment plan discussed with: patient  Plan details: Will start 1xwk then progress to 2xwk as appropriate      Visit Diagnoses:    ICD-10-CM  ICD-9-CM   1. Closed displaced comminuted fracture of right patella with routine healing  S82.041D V54.16   2. S/P ORIF (open reduction internal fixation) fracture  Z98.890 V45.89    Z87.81 V15.51   3. Right knee pain, unspecified chronicity  M25.561 719.46   4. Gait disturbance  R26.9 781.2       Progress per Plan of Care and Progress strengthening /stabilization /functional activity           Timed:  Manual Therapy:    8     mins  53058;  Therapeutic Exercise:    22     mins  36575;     Neuromuscular Warren:        mins  33225;    Therapeutic Activity:     10     mins  22056;     Gait Training:           mins  00122;     Ultrasound:          mins  55708;    Electrical Stimulation:         mins  22307 ( );    Untimed:  Electrical Stimulation:         mins  50347 ( );  Mechanical Traction:         mins  69647;     Timed Treatment:   40   mins   Total Treatment:     40   mins  Jocelin Pizano PT    Electronically singed 8/14/2024      [place] PT license: [id number]  Physical Therapist

## 2024-08-19 ENCOUNTER — OFFICE VISIT (OUTPATIENT)
Dept: ORTHOPEDIC SURGERY | Facility: CLINIC | Age: 64
End: 2024-08-19
Payer: COMMERCIAL

## 2024-08-19 VITALS
SYSTOLIC BLOOD PRESSURE: 112 MMHG | BODY MASS INDEX: 26.76 KG/M2 | OXYGEN SATURATION: 99 % | WEIGHT: 151.01 LBS | HEIGHT: 63 IN | DIASTOLIC BLOOD PRESSURE: 59 MMHG | HEART RATE: 89 BPM

## 2024-08-19 DIAGNOSIS — Z87.81 S/P ORIF (OPEN REDUCTION INTERNAL FIXATION) FRACTURE: ICD-10-CM

## 2024-08-19 DIAGNOSIS — M25.561 RIGHT KNEE PAIN, UNSPECIFIED CHRONICITY: Primary | ICD-10-CM

## 2024-08-19 DIAGNOSIS — Z98.890 S/P ORIF (OPEN REDUCTION INTERNAL FIXATION) FRACTURE: ICD-10-CM

## 2024-08-19 PROCEDURE — 99024 POSTOP FOLLOW-UP VISIT: CPT

## 2024-08-19 NOTE — PROGRESS NOTES
"Chief Complaint  Pain and Follow-up of the Right Knee    Subjective      Divina Virk presents to [facility] for follow up of her right knee.  Patient is 9 weeks status post open reduction internal fixation of the right patella performed with Dr. Quintanilla on 6/15/2024.  During her last office visit on 7/22/2024 patient had her hinged brace unlocked from 0 to 60 degrees and weight-bear as tolerated.    Today she states, she has been doing okay.  She states that she been working with physical therapy.  She states that she is having discomfort with bending the knee but she has been working on it on her own.  She has been icing and wearing her hinged brace as instructed.    Allergies   Allergen Reactions    Adhesive Tape Rash    Bupropion Unknown - Low Severity     Didn't like the way she felt taking       Objective     Vital Signs:   Vitals:    08/19/24 0858   BP: 112/59   Pulse: 89   SpO2: 99%   Weight: 68.5 kg (151 lb 0.2 oz)   Height: 160 cm (63\")     Body mass index is 26.75 kg/m².    I reviewed the patient's chief complaint, history of present illness, review of systems, past medical history, surgical history, family history, social history, medications, and allergy list.     REVIEW OF SYSTEMS    Constitutional: Denies fevers, chills, weight loss  Cardiovascular: Denies chest pain, shortness of breath  Skin: Denies rashes, acute skin changes  Neurologic: Denies headache, loss of consciousness  MSK: Right knee pain.     Ortho Exam  Knee   General: Alert, no acute distress.   Right knee: No pain with passive hip ROM.  Healed incision.  Knee stable to varus/valgus stress.  Knee extensor mechanism intact.  0 degrees knee extension. Flexion to 90 degrees. Calf soft, non-tender.  Sensation and neurovascularly intact.  Demonstrates active ankle dorsiflexion and plantarflexion.  Palpable pedal pulses.               Imaging Results (Most Recent)       Procedure Component Value Units Date/Time "    XR Knee AP & Lateral [[id number]] Resulted: 08/19/24 0927     Updated: 08/19/24 0929    Narrative:      X-Ray Report:  Study: X-rays ordered, taken in the office, and reviewed today  Site: Right knee xray  Indication: Right patella ORIF follow-up  View: AP, lateral, right knee view(s)  Findings: Comminuted fracture of the patella seen with stable alignment.    Right patella ORIF intact without evidence of hardware complications or   loosening.  Prior studies available for comparison: yes                 Assessment and Plan   Diagnoses and all orders for this visit:    1. Right knee pain, unspecified chronicity (Primary)  -     XR Knee AP & Lateral  -     Ambulatory Referral to Physical Therapy for Evaluation & Treatment    2. S/P ORIF (open reduction internal fixation) fracture  -     Ambulatory Referral to Physical Therapy for Evaluation & Treatment         Divina Virk presents to [facility] for her right knee.  Patient is 9-week status post open reduction internal fixation of the patella fracture performed Dr. Quintanilla on 6/15/2024.  Patient's hinged knee brace was set for 0-90 degrees with instruction to wear with ambulation. Advised patient to come out of the brace at home while seated to start achieving greater flexion than 90 degrees.  Patient was advised to continue with physical therapy.  Physical therapy order updated today.  Ice/elevate for pain/swelling.     Follow up in 3 weeks. We do not need xrays.  Plan to unlock hinged knee brace and potential transition to normal knee brace.    Tobacco Use: Medium Risk (8/19/2024)    Patient History     Smoking Tobacco Use: Former     Smokeless Tobacco Use: Never     Passive Exposure: Not on file     Patient reports they have a history of tobacco use; encouraged continued tobacco cessation for further health benefits.       Follow Up   Return in about 3 weeks (around 9/9/2024).  There are no Patient Instructions on file for this  visit.  Patient was given instructions and counseling regarding her condition or for health maintenance advice. Please see specific information pulled into the AVS if appropriate.       Dictated Utilizing Dragon Dictation. Please note that portions of this note were completed with a voice recognition program. Part of this note may be an electronic transcription/translation of spoken language to printed text using the Dragon Dictation System.

## 2024-08-20 NOTE — PROGRESS NOTES
Chief Complaint  Anxiety and Rash    Subjective          Divina Virk is a 63 y.o. female who presents to [facility]    History of Present Illness  History of Present Illness  The patient presents for evaluation of multiple medical concerns.    She sustained a knee injury in 06/2024, which required surgical intervention. Post-surgery, she was on a regimen of pain medication and Klonopin every 4 hours for the initial 2 weeks. She has been using a brace to aid in mobility, which is adjusted to allow an additional 30 degrees of movement every 30 days. She is able to ascend stairs but struggles with descending. She continues to attend physical therapy sessions. She reserves her pain medication for post-therapy use due to her need to drive herself.    She has been experiencing elevated blood pressure readings during her clinic visits, which she attributes to anxiety. However, she reports that her blood pressure was low during her last visit with Dr. Rendon.    She has been dealing with skin welts for over a year. These welts appear suddenly, itch, and leave scars upon scratching. Once scratched, they tend to bubble or burst. She has been using clobetasol for treatment.    She is on Lexapro and Klonopin for her anxiety.     She has not had her mammogram or lung screening done as she was unable to drive.      PHQ-2 Total Score:     PHQ-9 Total Score:          Health Maintenance Due   Topic Date Due    TDAP/TD VACCINES (1 - Tdap) Never done    LUNG CANCER SCREENING  Never done    ZOSTER VACCINE (2 of 2) 03/07/2023    COVID-19 Vaccine (3 - 2023-24 season) 09/01/2024    INFLUENZA VACCINE  08/01/2024        Review of Systems   Constitutional:  Negative for fever.   Musculoskeletal:  Positive for arthralgias (right knee pain).   Psychiatric/Behavioral:  Negative for sleep disturbance. The patient is nervous/anxious.           Medical History: has a past medical history of Anemia, Anxiety,  Depression, Diabetes mellitus type II, controlled, Hip arthrosis, Menopausal symptom (09/14/2015), and Rotator cuff syndrome.     Surgical History: has a past surgical history that includes Dilation and curettage of uterus (11/01/2011); Laser ablation (11/01/2011); Colonoscopy (10/12/2020); Knee surgery (06/15/24); and Patella Open Reduction Internal Fixation (Right, 6/15/2024).     Family History: family history is not on file. She was adopted.     Social History: reports that she quit smoking about 5 years ago. Her smoking use included cigarettes. She started smoking about 40 years ago. She has a 35 pack-year smoking history. She has never used smokeless tobacco. She reports that she does not drink alcohol and does not use drugs.    Allergies: Adhesive tape and Bupropion      Current Outpatient Medications:     Cholecalciferol (Vitamin D) 50 MCG (2000 UT) tablet, Take 1 tablet by mouth Daily., Disp: 90 tablet, Rfl: 1    clobetasol (TEMOVATE) 0.05 % gel, Apply 1 Application topically to the appropriate area as directed Every 12 (Twelve) Hours., Disp: 30 g, Rfl: 1    clonazePAM (KlonoPIN) 0.5 MG tablet, Take 1 tablet by mouth 2 (Two) Times a Day As Needed for Anxiety., Disp: 30 tablet, Rfl: 0    Coenzyme Q10 (CO Q 10 PO), Take  by mouth., Disp: , Rfl:     COLLAGEN PO, Take  by mouth., Disp: , Rfl:     escitalopram (Lexapro) 5 MG tablet, Take 1 tablet by mouth Daily., Disp: 90 tablet, Rfl: 1    HYDROcodone-acetaminophen (NORCO) 5-325 MG per tablet, Take 1 tablet by mouth Every 4 (Four) Hours As Needed for Moderate Pain., Disp: 42 tablet, Rfl: 0    meclizine (ANTIVERT) 25 MG tablet, Take 1 tablet by mouth 3 (Three) Times a Day As Needed for Dizziness., Disp: 30 tablet, Rfl: 0    triamcinolone (KENALOG) 0.1 % ointment, Apply 1 Application topically to the appropriate area as directed 2 (Two) Times a Day., Disp: 30 g, Rfl: 1      Immunization History   Administered Date(s) Administered    COVID-19 (PFIZER) Purple Cap  "Monovalent 06/24/2021, 07/21/2021    Fluzone Quad >6mos (Multi-dose) 10/09/2020    Influenza Injectable Mdck Pf Quad 01/10/2023    Pneumococcal Conjugate 13-Valent (PCV13) 10/09/2020    Shingrix 01/10/2023         Objective       Vitals:    09/05/24 0746   BP: 150/78   BP Location: Right arm   Pulse: 72   Temp: 98.4 °F (36.9 °C)   SpO2: 98%   Weight: 68.9 kg (152 lb)   Height: 160 cm (63\")      Body mass index is 26.93 kg/m².   Wt Readings from Last 3 Encounters:   09/05/24 68.9 kg (152 lb)   08/19/24 68.5 kg (151 lb 0.2 oz)   07/22/24 68.5 kg (151 lb 0.2 oz)      BP Readings from Last 3 Encounters:   09/05/24 150/78   08/19/24 112/59   07/22/24 124/63             Physical Exam  Vitals reviewed.   Constitutional:       Appearance: Normal appearance. She is well-developed.   HENT:      Head: Normocephalic and atraumatic.   Eyes:      Conjunctiva/sclera: Conjunctivae normal.      Pupils: Pupils are equal, round, and reactive to light.   Cardiovascular:      Rate and Rhythm: Normal rate and regular rhythm.      Heart sounds: Normal heart sounds. No murmur heard.  Pulmonary:      Effort: Pulmonary effort is normal.      Breath sounds: Normal breath sounds. No wheezing or rhonchi.   Abdominal:      General: Bowel sounds are normal. There is no distension.      Palpations: Abdomen is soft.      Tenderness: There is no abdominal tenderness.   Musculoskeletal:        Legs:       Comments: Leg brace in place.   Skin:     General: Skin is warm and dry.   Neurological:      Mental Status: She is alert and oriented to person, place, and time.   Psychiatric:         Mood and Affect: Mood and affect normal.         Behavior: Behavior normal.         Thought Content: Thought content normal.         Judgment: Judgment normal.         Physical Exam        Result Review :   Results         Common labs          10/27/2023    11:29 3/7/2024    14:07   Common Labs   Glucose  105    BUN  26    Creatinine  0.86    Sodium  137    Potassium "  4.2    Chloride  101    Calcium  9.2    Albumin  4.2    Total Bilirubin  0.4    Alkaline Phosphatase  74    AST (SGOT)  22    ALT (SGPT)  18    WBC  6.79    Hemoglobin  14.5    Hematocrit  44.0    Platelets  257    Total Cholesterol 226  221    Triglycerides 358  343    HDL Cholesterol 61  55    LDL Cholesterol  105  108    Hemoglobin A1C  6.00                     Assessment and Plan        Diagnoses and all orders for this visit:    1. Breast cancer screening by mammogram (Primary)  -     Mammo Screening Digital Tomosynthesis Bilateral With CAD; Future    2. Panic attacks  -     clonazePAM (KlonoPIN) 0.5 MG tablet; Take 1 tablet by mouth 2 (Two) Times a Day As Needed for Anxiety.  Dispense: 30 tablet; Refill: 0  -     escitalopram (Lexapro) 5 MG tablet; Take 1 tablet by mouth Daily.  Dispense: 90 tablet; Refill: 1    3. Anxiety about health  -     escitalopram (Lexapro) 5 MG tablet; Take 1 tablet by mouth Daily.  Dispense: 90 tablet; Refill: 1    4. Elevated blood pressure reading without diagnosis of hypertension    5. Mixed hyperlipidemia  -     Comprehensive Metabolic Panel  -     Lipid Panel    6. Impaired fasting glucose  -     Hemoglobin A1c        Assessment & Plan  1. Knee injury.  She reported a history of a knee injury in June, resulting in a smashed kneecap that required surgical intervention. She is currently undergoing physical therapy and is gradually increasing her knee's range of motion by 30 degrees every 30 days. She was advised to take her pain medication before physical therapy sessions but expressed concerns about driving after taking the medication. She was also advised to continue using her knee brace when leaving the house and to avoid carrying items while using stairs.    2. Hypertension.  Her blood pressure was noted to be high during the visit, which she attributed to anxiety about the appointment. She was advised to monitor her blood pressure at home to ensure it is not consistently  elevated.    3. Skin welts.  She reported recurrent skin welts that itch and scar after being scratched. A referral to dermatology was made for a potential biopsy to obtain a definitive diagnosis. She was instructed to continue using clobetasol and to avoid scratching the affected areas.    4. Anxiety.  She continues to take Lexapro and Klonopin for anxiety. Refills for Lexapro and Klonopin were provided. She was advised to continue taking 2000 units of vitamin D daily.    5. Health Maintenance.  She was advised to schedule her mammogram and lung screening concurrently. Lab work was ordered to monitor her sugar and cholesterol levels.    Obtained a written consent for JENNA query. Discussed the risks and benefits of the use of controlled substances with the patient, including the risk of tolerance and drug dependence.  The patient has been counseled on the need to have an exit strategy, including potentially discontinuing the use of controlled substances.  JENNA has or will be reviewed as soon as it becomes available.    Follow Up     Return in about 3 months (around 12/5/2024) for Next scheduled follow up.    Patient was given instructions and counseling regarding her condition or for health maintenance advice. Please see specific information pulled into the AVS if appropriate.     NADINE Batista    Patient or patient representative verbalized consent for the use of Ambient Listening during the visit with  NADINE Batista for chart documentation. 9/5/2024  08:15 EDT

## 2024-08-21 ENCOUNTER — TREATMENT (OUTPATIENT)
Dept: PHYSICAL THERAPY | Facility: CLINIC | Age: 64
End: 2024-08-21
Payer: COMMERCIAL

## 2024-08-21 DIAGNOSIS — M25.561 RIGHT KNEE PAIN, UNSPECIFIED CHRONICITY: ICD-10-CM

## 2024-08-21 DIAGNOSIS — Z87.81 S/P ORIF (OPEN REDUCTION INTERNAL FIXATION) FRACTURE: ICD-10-CM

## 2024-08-21 DIAGNOSIS — Z98.890 S/P ORIF (OPEN REDUCTION INTERNAL FIXATION) FRACTURE: ICD-10-CM

## 2024-08-21 DIAGNOSIS — S82.041D CLOSED DISPLACED COMMINUTED FRACTURE OF RIGHT PATELLA WITH ROUTINE HEALING: Primary | ICD-10-CM

## 2024-08-21 DIAGNOSIS — R26.9 GAIT DISTURBANCE: ICD-10-CM

## 2024-08-21 NOTE — PROGRESS NOTES
Physical Therapy Daily Treatment Note      Patient: Divina Virk   : 1960  Referring practitioner: NADINE Varghese  Date of Initial Visit: Type: THERAPY  Noted: 7/10/2024  Today's Date: 2024  Patient seen for 7 sessions           Subjective   Divina Virk reports: scar on right knee painful with flexion stretches      Objective   See Exercise, Manual, and Modality Logs for complete treatment.       Assessment & Plan       Assessment  Assessment details: Patient progressing well with increase in range of motion per protocol; still having right scar pain with flexion.        Visit Diagnoses:    ICD-10-CM ICD-9-CM   1. Closed displaced comminuted fracture of right patella with routine healing  S82.041D V54.16   2. S/P ORIF (open reduction internal fixation) fracture  Z98.890 V45.89    Z87.81 V15.51   3. Right knee pain, unspecified chronicity  M25.561 719.46   4. Gait disturbance  R26.9 781.2       Progress per Plan of Care and Progress strengthening /stabilization /functional activity           Timed:  Manual Therapy:         mins  66659;  Therapeutic Exercise:    15     mins  42754;     Neuromuscular Warren:        mins  18960;    Therapeutic Activity:     10     mins  38234;     Gait Training:           mins  25531;     Ultrasound:          mins  92886;    Electrical Stimulation:         mins  16893 ( );    Untimed:  Electrical Stimulation:         mins  60096 ( );  Mechanical Traction:         mins  66412;     Timed Treatment:   25   mins   Total Treatment:     25   mins  Jocelin Pizano PT    Electronically singed 2024      [place] PT license: [id number]  Physical Therapist

## 2024-08-28 ENCOUNTER — TREATMENT (OUTPATIENT)
Dept: PHYSICAL THERAPY | Facility: CLINIC | Age: 64
End: 2024-08-28
Payer: COMMERCIAL

## 2024-08-28 DIAGNOSIS — Z98.890 S/P ORIF (OPEN REDUCTION INTERNAL FIXATION) FRACTURE: ICD-10-CM

## 2024-08-28 DIAGNOSIS — M25.561 RIGHT KNEE PAIN, UNSPECIFIED CHRONICITY: ICD-10-CM

## 2024-08-28 DIAGNOSIS — R26.9 GAIT DISTURBANCE: ICD-10-CM

## 2024-08-28 DIAGNOSIS — S82.041D CLOSED DISPLACED COMMINUTED FRACTURE OF RIGHT PATELLA WITH ROUTINE HEALING: Primary | ICD-10-CM

## 2024-08-28 DIAGNOSIS — Z87.81 S/P ORIF (OPEN REDUCTION INTERNAL FIXATION) FRACTURE: ICD-10-CM

## 2024-08-28 NOTE — PROGRESS NOTES
Physical Therapy Daily Treatment Note      Patient: Divina Virk   : 1960  Referring practitioner: NADINE Varghese  Date of Initial Visit: Type: THERAPY  Noted: 7/10/2024  Today's Date: 2024  Patient seen for 8 sessions           Subjective   Divina Virk reports: right knee joint painful today and difficulty sleeping (doesn't want to take pain medication)      Objective   See Exercise, Manual, and Modality Logs for complete treatment.       Assessment & Plan       Assessment  Assessment details: Right knee AAROM 104 degrees of flexion        Visit Diagnoses:    ICD-10-CM ICD-9-CM   1. Closed displaced comminuted fracture of right patella with routine healing  S82.041D V54.16   2. S/P ORIF (open reduction internal fixation) fracture  Z98.890 V45.89    Z87.81 V15.51   3. Right knee pain, unspecified chronicity  M25.561 719.46   4. Gait disturbance  R26.9 781.2       Progress per Plan of Care and Progress strengthening /stabilization /functional activity           Timed:  Manual Therapy:         mins  62521;  Therapeutic Exercise:    30     mins  44357;     Neuromuscular Warren:        mins  60771;    Therapeutic Activity:     10     mins  15261;     Gait Training:           mins  64273;     Ultrasound:          mins  44829;    Electrical Stimulation:         mins  08547 ( );    Untimed:  Electrical Stimulation:         mins  65360 ( );  Mechanical Traction:         mins  66642;     Timed Treatment:   40   mins   Total Treatment:     40   mins  Jocelin Pizano PT    Electronically singed 2024      [place] PT license: [id number]  Physical Therapist

## 2024-09-04 ENCOUNTER — TREATMENT (OUTPATIENT)
Dept: PHYSICAL THERAPY | Facility: CLINIC | Age: 64
End: 2024-09-04
Payer: COMMERCIAL

## 2024-09-04 DIAGNOSIS — S82.041D CLOSED DISPLACED COMMINUTED FRACTURE OF RIGHT PATELLA WITH ROUTINE HEALING: Primary | ICD-10-CM

## 2024-09-04 DIAGNOSIS — Z87.81 S/P ORIF (OPEN REDUCTION INTERNAL FIXATION) FRACTURE: ICD-10-CM

## 2024-09-04 DIAGNOSIS — R26.9 GAIT DISTURBANCE: ICD-10-CM

## 2024-09-04 DIAGNOSIS — Z98.890 S/P ORIF (OPEN REDUCTION INTERNAL FIXATION) FRACTURE: ICD-10-CM

## 2024-09-04 DIAGNOSIS — M25.561 RIGHT KNEE PAIN, UNSPECIFIED CHRONICITY: ICD-10-CM

## 2024-09-04 NOTE — PROGRESS NOTES
Physical Therapy Daily Treatment Note      Patient: Divina Virk   : 1960  Referring practitioner: NADINE Varghese  Date of Initial Visit: Type: THERAPY  Noted: 7/10/2024  Today's Date: 2024  Patient seen for 9 sessions           Subjective   Divina Virk reports: right knee pain /10; still difficulty negotiating steps reciprocal      Objective   See Exercise, Manual, and Modality Logs for complete treatment.       Assessment & Plan       Assessment  Assessment details: Right knee AAROM 120 degrees of flexion.    Visit Diagnoses:    ICD-10-CM ICD-9-CM   1. Closed displaced comminuted fracture of right patella with routine healing  S82.041D V54.16   2. S/P ORIF (open reduction internal fixation) fracture  Z98.890 V45.89    Z87.81 V15.51   3. Right knee pain, unspecified chronicity  M25.561 719.46   4. Gait disturbance  R26.9 781.2       Progress per Plan of Care and Progress strengthening /stabilization /functional activity           Timed:  Manual Therapy:    8     mins  17216;  Therapeutic Exercise:    20     mins  12711;     Neuromuscular Warren:        mins  03098;    Therapeutic Activity:     12     mins  31373;     Gait Training:           mins  37088;     Ultrasound:          mins  01631;    Electrical Stimulation:         mins  07998 ( );    Untimed:  Electrical Stimulation:         mins  65932 ( );  Mechanical Traction:         mins  02314;     Timed Treatment:   40   mins   Total Treatment:     40   mins  Jocelin Pizano PT    Electronically singed 2024      [place] PT license: [id number]  Physical Therapist

## 2024-09-05 ENCOUNTER — OFFICE VISIT (OUTPATIENT)
Dept: FAMILY MEDICINE CLINIC | Facility: CLINIC | Age: 64
End: 2024-09-05
Payer: COMMERCIAL

## 2024-09-05 VITALS
HEIGHT: 63 IN | HEART RATE: 72 BPM | OXYGEN SATURATION: 98 % | WEIGHT: 152 LBS | DIASTOLIC BLOOD PRESSURE: 91 MMHG | TEMPERATURE: 98.4 F | BODY MASS INDEX: 26.93 KG/M2 | SYSTOLIC BLOOD PRESSURE: 151 MMHG

## 2024-09-05 DIAGNOSIS — F41.0 PANIC ATTACKS: ICD-10-CM

## 2024-09-05 DIAGNOSIS — R03.0 ELEVATED BLOOD PRESSURE READING WITHOUT DIAGNOSIS OF HYPERTENSION: ICD-10-CM

## 2024-09-05 DIAGNOSIS — Z12.31 BREAST CANCER SCREENING BY MAMMOGRAM: Primary | ICD-10-CM

## 2024-09-05 DIAGNOSIS — R45.89 ANXIETY ABOUT HEALTH: ICD-10-CM

## 2024-09-05 DIAGNOSIS — E78.2 MIXED HYPERLIPIDEMIA: ICD-10-CM

## 2024-09-05 DIAGNOSIS — R73.01 IMPAIRED FASTING GLUCOSE: ICD-10-CM

## 2024-09-05 LAB
ALBUMIN SERPL-MCNC: 4.5 G/DL (ref 3.5–5.2)
ALBUMIN/GLOB SERPL: 1.6 G/DL
ALP SERPL-CCNC: 94 U/L (ref 39–117)
ALT SERPL W P-5'-P-CCNC: 14 U/L (ref 1–33)
ANION GAP SERPL CALCULATED.3IONS-SCNC: 11 MMOL/L (ref 5–15)
AST SERPL-CCNC: 18 U/L (ref 1–32)
BILIRUB SERPL-MCNC: 0.8 MG/DL (ref 0–1.2)
BUN SERPL-MCNC: 13 MG/DL (ref 8–23)
BUN/CREAT SERPL: 19.4 (ref 7–25)
CALCIUM SPEC-SCNC: 9.5 MG/DL (ref 8.6–10.5)
CHLORIDE SERPL-SCNC: 102 MMOL/L (ref 98–107)
CHOLEST SERPL-MCNC: 225 MG/DL (ref 0–200)
CO2 SERPL-SCNC: 26 MMOL/L (ref 22–29)
CREAT SERPL-MCNC: 0.67 MG/DL (ref 0.57–1)
EGFRCR SERPLBLD CKD-EPI 2021: 97.7 ML/MIN/1.73
GLOBULIN UR ELPH-MCNC: 2.9 GM/DL
GLUCOSE SERPL-MCNC: 104 MG/DL (ref 65–99)
HBA1C MFR BLD: 5.9 % (ref 4.8–5.6)
HDLC SERPL-MCNC: 65 MG/DL (ref 40–60)
LDLC SERPL CALC-MCNC: 142 MG/DL (ref 0–100)
LDLC/HDLC SERPL: 2.15 {RATIO}
POTASSIUM SERPL-SCNC: 3.9 MMOL/L (ref 3.5–5.2)
PROT SERPL-MCNC: 7.4 G/DL (ref 6–8.5)
SODIUM SERPL-SCNC: 139 MMOL/L (ref 136–145)
TRIGL SERPL-MCNC: 100 MG/DL (ref 0–150)
VLDLC SERPL-MCNC: 18 MG/DL (ref 5–40)

## 2024-09-05 PROCEDURE — 80061 LIPID PANEL: CPT | Performed by: NURSE PRACTITIONER

## 2024-09-05 PROCEDURE — 83036 HEMOGLOBIN GLYCOSYLATED A1C: CPT | Performed by: NURSE PRACTITIONER

## 2024-09-05 PROCEDURE — 80053 COMPREHEN METABOLIC PANEL: CPT | Performed by: NURSE PRACTITIONER

## 2024-09-05 PROCEDURE — 99214 OFFICE O/P EST MOD 30 MIN: CPT | Performed by: NURSE PRACTITIONER

## 2024-09-05 RX ORDER — ESCITALOPRAM OXALATE 5 MG/1
5 TABLET ORAL DAILY
Qty: 90 TABLET | Refills: 1 | Status: SHIPPED | OUTPATIENT
Start: 2024-09-05

## 2024-09-05 RX ORDER — CLONAZEPAM 0.5 MG/1
0.5 TABLET ORAL 2 TIMES DAILY PRN
Qty: 30 TABLET | Refills: 0 | Status: SHIPPED | OUTPATIENT
Start: 2024-09-05

## 2024-09-09 ENCOUNTER — TELEPHONE (OUTPATIENT)
Dept: FAMILY MEDICINE CLINIC | Facility: CLINIC | Age: 64
End: 2024-09-09
Payer: COMMERCIAL

## 2024-09-09 NOTE — TELEPHONE ENCOUNTER
Pt states she hasn't been able to walk for 3 months , broke her patella . She states If you need to put her on a diet , she wants you to send her the diet that she needs to go by .

## 2024-09-11 ENCOUNTER — TREATMENT (OUTPATIENT)
Dept: PHYSICAL THERAPY | Facility: CLINIC | Age: 64
End: 2024-09-11
Payer: COMMERCIAL

## 2024-09-11 DIAGNOSIS — Z98.890 S/P ORIF (OPEN REDUCTION INTERNAL FIXATION) FRACTURE: ICD-10-CM

## 2024-09-11 DIAGNOSIS — M25.561 RIGHT KNEE PAIN, UNSPECIFIED CHRONICITY: ICD-10-CM

## 2024-09-11 DIAGNOSIS — Z87.81 S/P ORIF (OPEN REDUCTION INTERNAL FIXATION) FRACTURE: ICD-10-CM

## 2024-09-11 DIAGNOSIS — R26.9 GAIT DISTURBANCE: ICD-10-CM

## 2024-09-11 DIAGNOSIS — S82.041D CLOSED DISPLACED COMMINUTED FRACTURE OF RIGHT PATELLA WITH ROUTINE HEALING: Primary | ICD-10-CM

## 2024-09-11 NOTE — PROGRESS NOTES
Physical Therapy Daily Treatment Note      Patient: Divina Virk   : 1960  Referring practitioner: NADINE Varghese  Date of Initial Visit: Type: THERAPY  Noted: 7/10/2024  Today's Date: 2024  Patient seen for 10 sessions           Subjective   Divina Virk reports: right knee pain at rest /10 with end range of flexion 7/10      Objective   See Exercise, Manual, and Modality Logs for complete treatment.       Assessment & Plan       Assessment  Assessment details: R knee AROM 125 degrees of flexion AAROM 130 degrees of flexion        Visit Diagnoses:    ICD-10-CM ICD-9-CM   1. Closed displaced comminuted fracture of right patella with routine healing  S82.041D V54.16   2. S/P ORIF (open reduction internal fixation) fracture  Z98.890 V45.89    Z87.81 V15.51   3. Right knee pain, unspecified chronicity  M25.561 719.46   4. Gait disturbance  R26.9 781.2       Progress per Plan of Care and Progress strengthening /stabilization /functional activity           Timed:  Manual Therapy:         mins  34982;  Therapeutic Exercise:    28     mins  26378;     Neuromuscular Warren:        mins  54259;    Therapeutic Activity:     10     mins  14457;     Gait Training:           mins  24878;     Ultrasound:          mins  74485;    Electrical Stimulation:         mins  38058 ( );    Untimed:  Electrical Stimulation:         mins  49625 ( );  Mechanical Traction:         mins  30177;     Timed Treatment:   38   mins   Total Treatment:     38   mins  Jocelin Pizano PT    Electronically singed 2024      [place] PT license: [id number]  Physical Therapist

## 2024-09-18 ENCOUNTER — TREATMENT (OUTPATIENT)
Dept: PHYSICAL THERAPY | Facility: CLINIC | Age: 64
End: 2024-09-18
Payer: COMMERCIAL

## 2024-09-18 DIAGNOSIS — M25.561 RIGHT KNEE PAIN, UNSPECIFIED CHRONICITY: ICD-10-CM

## 2024-09-18 DIAGNOSIS — Z87.81 S/P ORIF (OPEN REDUCTION INTERNAL FIXATION) FRACTURE: ICD-10-CM

## 2024-09-18 DIAGNOSIS — S82.041D CLOSED DISPLACED COMMINUTED FRACTURE OF RIGHT PATELLA WITH ROUTINE HEALING: Primary | ICD-10-CM

## 2024-09-18 DIAGNOSIS — Z98.890 S/P ORIF (OPEN REDUCTION INTERNAL FIXATION) FRACTURE: ICD-10-CM

## 2024-09-18 DIAGNOSIS — R26.9 GAIT DISTURBANCE: ICD-10-CM

## 2024-09-20 ENCOUNTER — TREATMENT (OUTPATIENT)
Dept: PHYSICAL THERAPY | Facility: CLINIC | Age: 64
End: 2024-09-20
Payer: COMMERCIAL

## 2024-09-20 DIAGNOSIS — M25.561 RIGHT KNEE PAIN, UNSPECIFIED CHRONICITY: ICD-10-CM

## 2024-09-20 DIAGNOSIS — Z87.81 S/P ORIF (OPEN REDUCTION INTERNAL FIXATION) FRACTURE: ICD-10-CM

## 2024-09-20 DIAGNOSIS — Z98.890 S/P ORIF (OPEN REDUCTION INTERNAL FIXATION) FRACTURE: ICD-10-CM

## 2024-09-20 DIAGNOSIS — S82.041D CLOSED DISPLACED COMMINUTED FRACTURE OF RIGHT PATELLA WITH ROUTINE HEALING: Primary | ICD-10-CM

## 2024-09-20 DIAGNOSIS — R26.9 GAIT DISTURBANCE: ICD-10-CM

## 2024-09-23 ENCOUNTER — OFFICE VISIT (OUTPATIENT)
Dept: ORTHOPEDIC SURGERY | Facility: CLINIC | Age: 64
End: 2024-09-23
Payer: COMMERCIAL

## 2024-09-23 ENCOUNTER — TELEPHONE (OUTPATIENT)
Dept: FAMILY MEDICINE CLINIC | Facility: CLINIC | Age: 64
End: 2024-09-23
Payer: COMMERCIAL

## 2024-09-23 VITALS
HEIGHT: 63 IN | SYSTOLIC BLOOD PRESSURE: 171 MMHG | HEART RATE: 86 BPM | DIASTOLIC BLOOD PRESSURE: 98 MMHG | BODY MASS INDEX: 26.91 KG/M2 | WEIGHT: 151.9 LBS | OXYGEN SATURATION: 97 %

## 2024-09-23 DIAGNOSIS — Z98.890 S/P ORIF (OPEN REDUCTION INTERNAL FIXATION) FRACTURE: ICD-10-CM

## 2024-09-23 DIAGNOSIS — M25.561 RIGHT KNEE PAIN, UNSPECIFIED CHRONICITY: Primary | ICD-10-CM

## 2024-09-23 DIAGNOSIS — Z87.81 S/P ORIF (OPEN REDUCTION INTERNAL FIXATION) FRACTURE: ICD-10-CM

## 2024-09-23 PROCEDURE — 99213 OFFICE O/P EST LOW 20 MIN: CPT

## 2024-09-24 ENCOUNTER — OFFICE VISIT (OUTPATIENT)
Dept: FAMILY MEDICINE CLINIC | Facility: CLINIC | Age: 64
End: 2024-09-24
Payer: COMMERCIAL

## 2024-09-24 VITALS
DIASTOLIC BLOOD PRESSURE: 83 MMHG | BODY MASS INDEX: 26.93 KG/M2 | WEIGHT: 152 LBS | TEMPERATURE: 99.3 F | HEART RATE: 82 BPM | HEIGHT: 63 IN | OXYGEN SATURATION: 97 % | SYSTOLIC BLOOD PRESSURE: 161 MMHG

## 2024-09-24 DIAGNOSIS — I10 PRIMARY HYPERTENSION: Primary | ICD-10-CM

## 2024-09-24 PROCEDURE — 99213 OFFICE O/P EST LOW 20 MIN: CPT | Performed by: NURSE PRACTITIONER

## 2024-09-24 RX ORDER — LISINOPRIL 5 MG/1
5 TABLET ORAL DAILY
Qty: 30 TABLET | Refills: 1 | Status: SHIPPED | OUTPATIENT
Start: 2024-09-24

## 2024-09-25 ENCOUNTER — TREATMENT (OUTPATIENT)
Dept: PHYSICAL THERAPY | Facility: CLINIC | Age: 64
End: 2024-09-25
Payer: COMMERCIAL

## 2024-09-25 DIAGNOSIS — M25.561 RIGHT KNEE PAIN, UNSPECIFIED CHRONICITY: ICD-10-CM

## 2024-09-25 DIAGNOSIS — Z87.81 S/P ORIF (OPEN REDUCTION INTERNAL FIXATION) FRACTURE: ICD-10-CM

## 2024-09-25 DIAGNOSIS — S82.041D CLOSED DISPLACED COMMINUTED FRACTURE OF RIGHT PATELLA WITH ROUTINE HEALING: Primary | ICD-10-CM

## 2024-09-25 DIAGNOSIS — R26.9 GAIT DISTURBANCE: ICD-10-CM

## 2024-09-25 DIAGNOSIS — Z98.890 S/P ORIF (OPEN REDUCTION INTERNAL FIXATION) FRACTURE: ICD-10-CM

## 2024-09-27 ENCOUNTER — TREATMENT (OUTPATIENT)
Dept: PHYSICAL THERAPY | Facility: CLINIC | Age: 64
End: 2024-09-27
Payer: COMMERCIAL

## 2024-09-27 DIAGNOSIS — R26.9 GAIT DISTURBANCE: ICD-10-CM

## 2024-09-27 DIAGNOSIS — S82.041D CLOSED DISPLACED COMMINUTED FRACTURE OF RIGHT PATELLA WITH ROUTINE HEALING: Primary | ICD-10-CM

## 2024-09-27 DIAGNOSIS — M25.561 RIGHT KNEE PAIN, UNSPECIFIED CHRONICITY: ICD-10-CM

## 2024-09-27 DIAGNOSIS — Z98.890 S/P ORIF (OPEN REDUCTION INTERNAL FIXATION) FRACTURE: ICD-10-CM

## 2024-09-27 DIAGNOSIS — Z87.81 S/P ORIF (OPEN REDUCTION INTERNAL FIXATION) FRACTURE: ICD-10-CM

## 2024-10-02 ENCOUNTER — TREATMENT (OUTPATIENT)
Dept: PHYSICAL THERAPY | Facility: CLINIC | Age: 64
End: 2024-10-02
Payer: COMMERCIAL

## 2024-10-02 DIAGNOSIS — S82.041D CLOSED DISPLACED COMMINUTED FRACTURE OF RIGHT PATELLA WITH ROUTINE HEALING: Primary | ICD-10-CM

## 2024-10-02 DIAGNOSIS — R26.9 GAIT DISTURBANCE: ICD-10-CM

## 2024-10-02 DIAGNOSIS — Z98.890 S/P ORIF (OPEN REDUCTION INTERNAL FIXATION) FRACTURE: ICD-10-CM

## 2024-10-02 DIAGNOSIS — M25.561 RIGHT KNEE PAIN, UNSPECIFIED CHRONICITY: ICD-10-CM

## 2024-10-02 DIAGNOSIS — Z87.81 S/P ORIF (OPEN REDUCTION INTERNAL FIXATION) FRACTURE: ICD-10-CM

## 2024-10-02 NOTE — PROGRESS NOTES
Physical Therapy Daily Treatment Note      Patient: Divina Virk   : 1960  Referring practitioner: NADINE Varghese  Date of Initial Visit: Type: THERAPY  Noted: 7/10/2024  Today's Date: 10/2/2024  Patient seen for 15 sessions           Subjective   Divina Virk reports: increased right knee pain with rain/stormy weather.       Objective   See Exercise, Manual, and Modality Logs for complete treatment.       Assessment & Plan       Assessment  Assessment details: Patient continues to progress toward goals.     Visit Diagnoses:    ICD-10-CM ICD-9-CM   1. Closed displaced comminuted fracture of right patella with routine healing  S82.041D V54.16   2. S/P ORIF (open reduction internal fixation) fracture  Z98.890 V45.89    Z87.81 V15.51   3. Right knee pain, unspecified chronicity  M25.561 719.46   4. Gait disturbance  R26.9 781.2       Progress per Plan of Care and Progress strengthening /stabilization /functional activity           Timed:  Manual Therapy:    10    mins  95514;  Therapeutic Exercise:    10     mins  50283;     Neuromuscular Warren:        mins  87043;    Therapeutic Activity:     8     mins  00466;     Gait Training:           mins  11372;     Ultrasound:          mins  51455;    Electrical Stimulation:         mins  63989 ( );    Untimed:  Electrical Stimulation:         mins  90196 ( );  Mechanical Traction:         mins  02049;     Timed Treatment:   28   mins   Total Treatment:     28   mins  Jocelin Pizano PT    Electronically singed 10/2/2024      [place] PT license: [id number]  Physical Therapist

## 2024-10-08 ENCOUNTER — HOSPITAL ENCOUNTER (OUTPATIENT)
Dept: MAMMOGRAPHY | Facility: HOSPITAL | Age: 64
Discharge: HOME | End: 2024-10-08
Admitting: NURSE PRACTITIONER
Payer: COMMERCIAL

## 2024-10-08 DIAGNOSIS — Z12.31 BREAST CANCER SCREENING BY MAMMOGRAM: ICD-10-CM

## 2024-10-08 PROCEDURE — 77063 BREAST TOMOSYNTHESIS BI: CPT

## 2024-10-08 PROCEDURE — 77067 SCR MAMMO BI INCL CAD: CPT

## 2024-10-10 ENCOUNTER — TREATMENT (OUTPATIENT)
Dept: PHYSICAL THERAPY | Facility: CLINIC | Age: 64
End: 2024-10-10
Payer: COMMERCIAL

## 2024-10-10 DIAGNOSIS — Z87.81 S/P ORIF (OPEN REDUCTION INTERNAL FIXATION) FRACTURE: ICD-10-CM

## 2024-10-10 DIAGNOSIS — S82.041D CLOSED DISPLACED COMMINUTED FRACTURE OF RIGHT PATELLA WITH ROUTINE HEALING: Primary | ICD-10-CM

## 2024-10-10 DIAGNOSIS — M25.561 RIGHT KNEE PAIN, UNSPECIFIED CHRONICITY: ICD-10-CM

## 2024-10-10 DIAGNOSIS — R26.9 GAIT DISTURBANCE: ICD-10-CM

## 2024-10-10 DIAGNOSIS — Z98.890 S/P ORIF (OPEN REDUCTION INTERNAL FIXATION) FRACTURE: ICD-10-CM

## 2024-10-10 NOTE — PROGRESS NOTES
Chief Complaint  Hypertension (Follow up)    Subjective          Divina Virk is a 63 y.o. female who presents to [facility]    History of Present Illness  History of Present Illness  The patient presents for evaluation of blood pressure.    She is currently on a daily regimen of 10 mg medication for her blood pressure, which she reports as being well-controlled.    Anxiety is improved with taking her Lexapro daily.  Patient does occasionally have to take clonazepam for anxiety attacks.  Patient denies any medication side effects.      The PHQ has not been completed during this encounter.            Health Maintenance Due   Topic Date Due    TDAP/TD VACCINES (1 - Tdap) Never done    LUNG CANCER SCREENING  Never done    ZOSTER VACCINE (2 of 2) 03/07/2023    COVID-19 Vaccine (3 - 2024-25 season) 09/01/2024        Review of Systems   Constitutional:  Negative for fever.   Musculoskeletal:  Positive for arthralgias (left hip).   Psychiatric/Behavioral:  Negative for sleep disturbance. The patient is nervous/anxious.           Medical History: has a past medical history of Anemia, Anxiety, Depression, Diabetes mellitus type II, controlled, Hip arthrosis, Menopausal symptom (09/14/2015), and Rotator cuff syndrome.     Surgical History: has a past surgical history that includes Dilation and curettage of uterus (11/01/2011); Laser ablation (11/01/2011); Colonoscopy (10/12/2020); Knee surgery (06/15/24); and Patella Open Reduction Internal Fixation (Right, 6/15/2024).     Family History: family history is not on file. She was adopted.     Social History: reports that she quit smoking about 5 years ago. Her smoking use included cigarettes. She started smoking about 40 years ago. She has a 35 pack-year smoking history. She has never used smokeless tobacco. She reports that she does not drink alcohol and does not use drugs.    Allergies: Adhesive tape and Bupropion      Current Outpatient  "Medications:     Cholecalciferol (Vitamin D) 50 MCG (2000 UT) tablet, Take 1 tablet by mouth Daily., Disp: 90 tablet, Rfl: 1    clonazePAM (KlonoPIN) 0.5 MG tablet, Take 1 tablet by mouth 2 (Two) Times a Day As Needed for Anxiety., Disp: 30 tablet, Rfl: 0    COLLAGEN PO, Take  by mouth., Disp: , Rfl:     escitalopram (Lexapro) 5 MG tablet, Take 1 tablet by mouth Daily., Disp: 90 tablet, Rfl: 1    lisinopril (PRINIVIL,ZESTRIL) 10 MG tablet, Take 1 tablet by mouth Daily., Disp: 90 tablet, Rfl: 1    clobetasol (TEMOVATE) 0.05 % gel, Apply 1 Application topically to the appropriate area as directed Every 12 (Twelve) Hours. (Patient not taking: Reported on 11/7/2024), Disp: 30 g, Rfl: 1    Coenzyme Q10 (CO Q 10 PO), Take  by mouth. (Patient not taking: Reported on 11/7/2024), Disp: , Rfl:     HYDROcodone-acetaminophen (NORCO) 5-325 MG per tablet, Take 1 tablet by mouth Every 4 (Four) Hours As Needed for Moderate Pain. (Patient not taking: Reported on 11/7/2024), Disp: 42 tablet, Rfl: 0    meclizine (ANTIVERT) 25 MG tablet, Take 1 tablet by mouth 3 (Three) Times a Day As Needed for Dizziness. (Patient not taking: Reported on 11/7/2024), Disp: 30 tablet, Rfl: 0    triamcinolone (KENALOG) 0.1 % ointment, Apply 1 Application topically to the appropriate area as directed 2 (Two) Times a Day. (Patient not taking: Reported on 11/7/2024), Disp: 30 g, Rfl: 1      Immunization History   Administered Date(s) Administered    COVID-19 (PFIZER) Purple Cap Monovalent 06/24/2021, 07/21/2021    Fluzone  >6mos 11/07/2024    Fluzone Quad >6mos (Multi-dose) 10/09/2020    Influenza Injectable Mdck Pf Quad 01/10/2023    Pneumococcal Conjugate 13-Valent (PCV13) 10/09/2020    Shingrix 01/10/2023         Objective       Vitals:    11/07/24 1008   BP: 134/85   Pulse: 82   Temp: 98.6 °F (37 °C)   TempSrc: Temporal   SpO2: 97%   Weight: 68.9 kg (152 lb)   Height: 160 cm (62.99\")      Body mass index is 26.93 kg/m².   Wt Readings from Last 3 " Encounters:   11/07/24 68.9 kg (152 lb)   09/24/24 68.9 kg (152 lb)   09/23/24 68.9 kg (151 lb 14.4 oz)      BP Readings from Last 3 Encounters:   11/07/24 134/85   09/24/24 161/83   09/23/24 171/98             Physical Exam  Vitals reviewed.   Constitutional:       Appearance: Normal appearance. She is well-developed.   HENT:      Head: Normocephalic and atraumatic.   Eyes:      Conjunctiva/sclera: Conjunctivae normal.      Pupils: Pupils are equal, round, and reactive to light.   Cardiovascular:      Rate and Rhythm: Normal rate and regular rhythm.      Heart sounds: Normal heart sounds. No murmur heard.  Pulmonary:      Effort: Pulmonary effort is normal.      Breath sounds: Normal breath sounds. No wheezing or rhonchi.   Abdominal:      General: Bowel sounds are normal. There is no distension.      Palpations: Abdomen is soft.      Tenderness: There is no abdominal tenderness.   Skin:     General: Skin is warm and dry.   Neurological:      Mental Status: She is alert and oriented to person, place, and time.   Psychiatric:         Mood and Affect: Mood and affect normal.         Behavior: Behavior normal.         Thought Content: Thought content normal.         Judgment: Judgment normal.         Physical Exam        Result Review :   Results         Common labs          3/7/2024    14:07 9/5/2024    08:47   Common Labs   Glucose 105  104    BUN 26  13    Creatinine 0.86  0.67    Sodium 137  139    Potassium 4.2  3.9    Chloride 101  102    Calcium 9.2  9.5    Albumin 4.2  4.5    Total Bilirubin 0.4  0.8    Alkaline Phosphatase 74  94    AST (SGOT) 22  18    ALT (SGPT) 18  14    WBC 6.79     Hemoglobin 14.5     Hematocrit 44.0     Platelets 257     Total Cholesterol 221  225    Triglycerides 343  100    HDL Cholesterol 55  65    LDL Cholesterol  108  142    Hemoglobin A1C 6.00  5.90                     Assessment and Plan        Diagnoses and all orders for this visit:    1. Primary hypertension (Primary)  -      lisinopril (PRINIVIL,ZESTRIL) 10 MG tablet; Take 1 tablet by mouth Daily.  Dispense: 90 tablet; Refill: 1    2. Need for influenza vaccination  -     Fluzone >6mos (8074-2898)    3. Panic attacks  -     clonazePAM (KlonoPIN) 0.5 MG tablet; Take 1 tablet by mouth 2 (Two) Times a Day As Needed for Anxiety.  Dispense: 30 tablet; Refill: 0    4. Left hip pain  -     Ambulatory Referral to Physical Therapy for Evaluation & Treatment    5. Mixed hyperlipidemia  -     Comprehensive Metabolic Panel; Future  -     Lipid Panel; Future        Assessment & Plan  1. Hypertension.  Her blood pressure readings are within the normal range. A 90-day prescription for her current antihypertensive medication will be provided.     2. Anxiety  a refill for Klonopin will be issued.  Patient will continue her Lexapro daily as directed.    Obtained a written consent for JENNA query. Discussed the risks and benefits of the use of controlled substances with the patient, including the risk of tolerance and drug dependence.  The patient has been counseled on the need to have an exit strategy, including potentially discontinuing the use of controlled substances.  JENNA has or will be reviewed as soon as it becomes available.    Return in about 3 months (around 2/7/2025).    Patient was given instructions and counseling regarding her condition or for health maintenance advice. Please see specific information pulled into the AVS if appropriate.     NADINE Batista    Patient or patient representative verbalized consent for the use of Ambient Listening during the visit with  NADINE Batista for chart documentation. 11/7/2024  10:31 EST

## 2024-10-10 NOTE — PROGRESS NOTES
Physical Therapy Daily Treatment Note  [address], [address], [place] [address]      Patient: Divina Virk   : 1960  Diagnosis/ICD-10 Code:  Closed displaced comminuted fracture of right patella with routine healing [S82.041D]  Referring practitioner: NADINE Varghese  Date of Initial Visit: Type: THERAPY  Noted: 7/10/2024  Today's Date: 10/10/2024  Patient seen for 16 sessions           Subjective   Divina Major reports: stiffness in R knee at beginning of session today.  Pt states that she continues to feel her knee is tight and that she is nervous to balance on R LE due to previous fall/decreased balance.    Objective   6 degrees ext lag during R SLR  See Exercise, Manual, and Modality Logs for complete treatment.       Assessment/Plan  Patient tolerated all exercise progressions well today but continues to demonstrate R knee strength deficits limiting function. No weighted exercises performed today.  Pt demonstrates decreased quad activation during exercises as she fatigues.  Pt education provided to use ice to decrease R knee swelling at home.  Pt reports decreased stiffness post session.  Continue to progress per patient tolerance.    Progress per Plan of Care           Timed:  Manual Therapy:    0     mins  54577;  Therapeutic Exercise:    30     mins  84761;     Neuromuscular Warren:   0     mins  69007;    Therapeutic Activity:     8     mins  32077;     Gait Trainin     mins  97740;     Ultrasound:     0     mins  71843;    Electrical Stimulation:    0     mins  83593;  Iontophoresis     0     mins  83905    Untimed:  Electrical Stimulation:    0     mins  05193 (MC );  Mechanical Traction:    0     mins  38726;   Fluidotherapy     0     mins  25361  Hot pack     0     Mins    Cold pack                       0     Mins     Timed Treatment:   38   mins   Total Treatment:     38   mins        Gia Matias PT    Electronically signed [unfilled]  [place] License: [id number]  NPI  number: [id number]

## 2024-10-14 ENCOUNTER — PATIENT MESSAGE (OUTPATIENT)
Dept: FAMILY MEDICINE CLINIC | Facility: CLINIC | Age: 64
End: 2024-10-14
Payer: COMMERCIAL

## 2024-10-15 ENCOUNTER — TELEPHONE (OUTPATIENT)
Dept: ORTHOPEDICS | Facility: OTHER | Age: 64
End: 2024-10-15
Payer: COMMERCIAL

## 2024-10-16 ENCOUNTER — PATIENT MESSAGE (OUTPATIENT)
Dept: FAMILY MEDICINE CLINIC | Facility: CLINIC | Age: 64
End: 2024-10-16
Payer: COMMERCIAL

## 2024-10-16 DIAGNOSIS — I10 PRIMARY HYPERTENSION: ICD-10-CM

## 2024-10-16 RX ORDER — LISINOPRIL 10 MG/1
10 TABLET ORAL DAILY
Qty: 30 TABLET | Refills: 1 | Status: SHIPPED | OUTPATIENT
Start: 2024-10-16

## 2024-10-22 ENCOUNTER — TREATMENT (OUTPATIENT)
Dept: PHYSICAL THERAPY | Facility: CLINIC | Age: 64
End: 2024-10-22
Payer: COMMERCIAL

## 2024-10-22 DIAGNOSIS — S82.041D CLOSED DISPLACED COMMINUTED FRACTURE OF RIGHT PATELLA WITH ROUTINE HEALING: Primary | ICD-10-CM

## 2024-10-22 DIAGNOSIS — M25.561 RIGHT KNEE PAIN, UNSPECIFIED CHRONICITY: ICD-10-CM

## 2024-10-22 DIAGNOSIS — R26.9 GAIT DISTURBANCE: ICD-10-CM

## 2024-10-22 DIAGNOSIS — Z87.81 S/P ORIF (OPEN REDUCTION INTERNAL FIXATION) FRACTURE: ICD-10-CM

## 2024-10-22 DIAGNOSIS — Z98.890 S/P ORIF (OPEN REDUCTION INTERNAL FIXATION) FRACTURE: ICD-10-CM

## 2024-10-22 PROCEDURE — 97110 THERAPEUTIC EXERCISES: CPT | Performed by: PHYSICAL THERAPIST

## 2024-10-22 PROCEDURE — 97530 THERAPEUTIC ACTIVITIES: CPT | Performed by: PHYSICAL THERAPIST

## 2024-10-22 NOTE — PROGRESS NOTES
Physical Therapy Progress Note  [address], [address], [place] [address]      Patient: Divina Virk   : 1960  Referring practitioner: NADINE Varghese  Date of Initial Visit: Type: THERAPY  Noted: 7/10/2024  Today's Date: 10/24/2024  Patient seen for 17 sessions           Subjective   Divina Virk reports: right knee pain /10. Patient reports still having difficulty with prolonged walking/stair negotiation, unable to kneel on right knee, and unable to bring right foot back to touch buttocks like she can with left LE.   Subjective Questionnaire: LEFS: 43/80=46% limitation improved from prior score /=64% limitation       Objective          Active Range of Motion     Right Knee   Flexion: 140 degrees   Extension: 0 degrees     Strength/Myotome Testing     Right Knee   Flexion: 4  Extension: 4  Quadriceps contraction: fair      See Exercise, Manual, and Modality Logs for complete treatment.       Assessment & Plan       Assessment  Impairments: abnormal gait, abnormal or restricted ROM, activity intolerance, impaired balance, impaired physical strength, lacks appropriate home exercise program, pain with function, safety issue and weight-bearing intolerance   Functional limitations: walking, uncomfortable because of pain, sitting, standing, stooping and unable to perform repetitive tasks   Assessment details: Patient making good progress per protocol. Patient still has deficits of right knee range of motion and weakness. Patient would benefit from continued skilled physical therapy for improved functional mobility (progressing per limitations of protocol)  Prognosis: good    Goals  Plan Goals: 1. The patient has limited ROM of the right knee.   LTG 1: 12 weeks:  The patient will demonstrate 0 to 120 degrees of ROM for the right knee in order to allow patient to normalize gait.   STATUS:  Met   STG 1a: 6 weeks:  The patient will demonstrate 0 to 90 degrees of ROM for the right knee.   STATUS:   Met     2. The patient has limited strength of the right knee.   LTG 2: 12 weeks: The patient will demonstrate 4/5 strength for right knee flexion and extension in order to allow patient improved joint stability.   STATUS:Met progress to 5/5  STG 2a: 6 weeks: The patient will demonstrate 3/5 strength for right knee flexion and extension   STATUS:Met     3. The patient has gait dysfunction.   LTG 3: 12 weeks:  The patient will ambulate without assistive device, independently, for community distances with minimal limp to the right lower extremity in order to improve mobility and allow patient to perform activities such as grocery shopping with greater ease.   STATUS: Met  STG 3a: The patient will be independent in HEP.   STATUS:  Met and progressing as appropriate     4. The patient complains of right knee pain.   LTG 4: 12 weeks:  The patient will report a pain rating of 2/10 or better in order to improve tolerance to activities of daily living and improve sleep quality.   STATUS:  ongoing  STG 4a: 6weeks:  The patient will report a pain rating of 4/10 or better.   STATUS:  Met     5. Mobility: Walking/Moving Around Functional Limitation     LTG 5: 12 weeks:  The patient will demonstrate 25% limitation by achieving a score of 60/80 on the Lower Extremity Functional Scale.   STATUS: progressing  STG 5a: 6 weeks:  The patient will demonstrate 50% limitation by achieving a score of 40/80 on the Lower Extremity Functional Scale.     STATUS: Met        Plan  Therapy options: will be seen for skilled therapy services  Planned modality interventions: cryotherapy and TENS  Planned therapy interventions: manual therapy, soft tissue mobilization, strengthening, stretching, therapeutic activities, joint mobilization, home exercise program, functional ROM exercises, flexibility and balance/weight-bearing training  Frequency: 2x week  Duration in weeks: 4  Treatment plan discussed with: patient        Visit Diagnoses:     ICD-10-CM ICD-9-CM   1. Closed displaced comminuted fracture of right patella with routine healing  S82.041D V54.16   2. S/P ORIF (open reduction internal fixation) fracture  Z98.890 V45.89    Z87.81 V15.51   3. Right knee pain, unspecified chronicity  M25.561 719.46   4. Gait disturbance  R26.9 781.2       Progress per Plan of Care           Timed:  Manual Therapy:    4     mins  68928;  Therapeutic Exercise:    10     mins  74005;     Neuromuscular Warren:        mins  90251;    Therapeutic Activity:   12       mins  16412;     Gait Training:           mins  69880;     Ultrasound:          mins  39054;    Electrical Stimulation:         mins  24882 ( );    Untimed:  Electrical Stimulation:         mins  98929 ( );  Mechanical Traction:         mins  05805;     Timed Treatment:   26   mins   Total Treatment:     26   mins  Jocelin Pizano PT    Electronically singed 10/24/2024      [place] PT license: [id number]  Physical Therapist

## 2024-10-24 ENCOUNTER — TREATMENT (OUTPATIENT)
Dept: PHYSICAL THERAPY | Facility: CLINIC | Age: 64
End: 2024-10-24
Payer: COMMERCIAL

## 2024-10-24 DIAGNOSIS — S82.041D CLOSED DISPLACED COMMINUTED FRACTURE OF RIGHT PATELLA WITH ROUTINE HEALING: Primary | ICD-10-CM

## 2024-10-24 DIAGNOSIS — R26.9 GAIT DISTURBANCE: ICD-10-CM

## 2024-10-24 DIAGNOSIS — Z98.890 S/P ORIF (OPEN REDUCTION INTERNAL FIXATION) FRACTURE: ICD-10-CM

## 2024-10-24 DIAGNOSIS — M25.561 RIGHT KNEE PAIN, UNSPECIFIED CHRONICITY: ICD-10-CM

## 2024-10-24 DIAGNOSIS — Z87.81 S/P ORIF (OPEN REDUCTION INTERNAL FIXATION) FRACTURE: ICD-10-CM

## 2024-10-24 NOTE — PROGRESS NOTES
Physical Therapy Daily Treatment Note      Patient: Divina Virk   : 1960  Referring practitioner: NADINE Varghese  Date of Initial Visit: Type: THERAPY  Noted: 7/10/2024  Today's Date: 10/24/2024  Patient seen for 18 sessions           Subjective   Divina Virk reports: right knee pain 3/10 secondary to practicing stairs a lot yesterday      Objective   See Exercise, Manual, and Modality Logs for complete treatment.       Assessment/Plan    Visit Diagnoses:    ICD-10-CM ICD-9-CM   1. Closed displaced comminuted fracture of right patella with routine healing  S82.041D V54.16   2. S/P ORIF (open reduction internal fixation) fracture  Z98.890 V45.89    Z87.81 V15.51   3. Right knee pain, unspecified chronicity  M25.561 719.46   4. Gait disturbance  R26.9 781.2       Progress per Plan of Care and Progress strengthening /stabilization /functional activity           Timed:  Manual Therapy:    8     mins  35676;  Therapeutic Exercise:    12     mins  67147;     Neuromuscular Warren:        mins  71960;    Therapeutic Activity:     10     mins  76373;     Gait Training:           mins  49308;     Ultrasound:          mins  13040;    Electrical Stimulation:         mins  42419 ( );    Untimed:  Electrical Stimulation:         mins  19936 ( );  Mechanical Traction:         mins  46249;     Timed Treatment:   30   mins   Total Treatment:     30   mins  Jocelin Pizano PT    Electronically singed 10/24/2024      [place] PT license: [id number]  Physical Therapist

## 2024-10-29 ENCOUNTER — TREATMENT (OUTPATIENT)
Dept: PHYSICAL THERAPY | Facility: CLINIC | Age: 64
End: 2024-10-29
Payer: COMMERCIAL

## 2024-10-29 DIAGNOSIS — M25.561 RIGHT KNEE PAIN, UNSPECIFIED CHRONICITY: ICD-10-CM

## 2024-10-29 DIAGNOSIS — Z87.81 S/P ORIF (OPEN REDUCTION INTERNAL FIXATION) FRACTURE: ICD-10-CM

## 2024-10-29 DIAGNOSIS — S82.041D CLOSED DISPLACED COMMINUTED FRACTURE OF RIGHT PATELLA WITH ROUTINE HEALING: Primary | ICD-10-CM

## 2024-10-29 DIAGNOSIS — R26.9 GAIT DISTURBANCE: ICD-10-CM

## 2024-10-29 DIAGNOSIS — Z98.890 S/P ORIF (OPEN REDUCTION INTERNAL FIXATION) FRACTURE: ICD-10-CM

## 2024-10-29 PROCEDURE — 97530 THERAPEUTIC ACTIVITIES: CPT | Performed by: PHYSICAL THERAPIST

## 2024-10-29 PROCEDURE — 97110 THERAPEUTIC EXERCISES: CPT | Performed by: PHYSICAL THERAPIST

## 2024-10-29 NOTE — PROGRESS NOTES
Physical Therapy Daily Treatment Note      Patient: Divina Virk   : 1960  Referring practitioner: NADINE Varghese  Date of Initial Visit: Type: THERAPY  Noted: 7/10/2024  Today's Date: 10/29/2024  Patient seen for 19 sessions           Subjective   Divina Virk reports: still having difficulty with end range of right knee flexion; inability to kneel on right knee      Objective   See Exercise, Manual, and Modality Logs for complete treatment.       Assessment & Plan       Assessment  Assessment details: Patient's right knee range of motion is within normal limits, but not equal to range of motion of left knee. Patient still has weakness in right knee.     Visit Diagnoses:    ICD-10-CM ICD-9-CM   1. Closed displaced comminuted fracture of right patella with routine healing  S82.041D V54.16   2. S/P ORIF (open reduction internal fixation) fracture  Z98.890 V45.89    Z87.81 V15.51   3. Right knee pain, unspecified chronicity  M25.561 719.46   4. Gait disturbance  R26.9 781.2       Progress per Plan of Care           Timed:  Manual Therapy:    5     mins  99692;  Therapeutic Exercise:    10     mins  24494;     Neuromuscular Warren:        mins  26055;    Therapeutic Activity:     10     mins  03289;     Gait Training:           mins  71333;     Ultrasound:          mins  05946;    Electrical Stimulation:         mins  45196 ( );    Untimed:  Electrical Stimulation:         mins  66821 ( );  Mechanical Traction:         mins  49181;     Timed Treatment:   25   mins   Total Treatment:     25   mins  Jocelin Pizano PT    Electronically singed 10/29/2024      [place] PT license: [id number]  Physical Therapist

## 2024-10-31 ENCOUNTER — TREATMENT (OUTPATIENT)
Dept: PHYSICAL THERAPY | Facility: CLINIC | Age: 64
End: 2024-10-31
Payer: COMMERCIAL

## 2024-10-31 DIAGNOSIS — M25.561 RIGHT KNEE PAIN, UNSPECIFIED CHRONICITY: ICD-10-CM

## 2024-10-31 DIAGNOSIS — Z98.890 S/P ORIF (OPEN REDUCTION INTERNAL FIXATION) FRACTURE: ICD-10-CM

## 2024-10-31 DIAGNOSIS — S82.041D CLOSED DISPLACED COMMINUTED FRACTURE OF RIGHT PATELLA WITH ROUTINE HEALING: Primary | ICD-10-CM

## 2024-10-31 DIAGNOSIS — Z87.81 S/P ORIF (OPEN REDUCTION INTERNAL FIXATION) FRACTURE: ICD-10-CM

## 2024-10-31 DIAGNOSIS — R26.9 GAIT DISTURBANCE: ICD-10-CM

## 2024-10-31 PROCEDURE — 97110 THERAPEUTIC EXERCISES: CPT | Performed by: PHYSICAL THERAPIST

## 2024-10-31 PROCEDURE — 97530 THERAPEUTIC ACTIVITIES: CPT | Performed by: PHYSICAL THERAPIST

## 2024-10-31 NOTE — PROGRESS NOTES
"Physical Therapy Daily Treatment Note      Patient: Divina Virk   : 1960  Referring practitioner: NADINE Varghese  Date of Initial Visit: Type: THERAPY  Noted: 7/10/2024  Today's Date: 10/31/2024  Patient seen for 20 sessions           Subjective   Divina Virk reports: right knee \"popping\" today states increase right knee pain without incident (other than going to rain today) pain rated 7/10      Objective   See Exercise, Manual, and Modality Logs for complete treatment.       Assessment & Plan       Assessment  Assessment details: Patient demonstrating decrease tolerance to exercises today secondary to right patella \"clicking\" with knee flexion.  Patient reports that she is still disappointed that she can not kneel on right knee.   Patient still demonstrated right quad/gluteal weakness and will continue to progress strengthening.     Visit Diagnoses:    ICD-10-CM ICD-9-CM   1. Closed displaced comminuted fracture of right patella with routine healing  S82.041D V54.16   2. S/P ORIF (open reduction internal fixation) fracture  Z98.890 V45.89    Z87.81 V15.51   3. Right knee pain, unspecified chronicity  M25.561 719.46   4. Gait disturbance  R26.9 781.2       Progress per Plan of Care and Progress strengthening /stabilization /functional activity           Timed:  Manual Therapy:    5     mins  73583;  Therapeutic Exercise:    12     mins  70233;     Neuromuscular Warren:        mins  36045;    Therapeutic Activity:     8     mins  14182;     Gait Training:           mins  34673;     Ultrasound:          mins  12987;    Electrical Stimulation:         mins  68244 ( );    Untimed:  Electrical Stimulation:         mins  01171 ( );  Mechanical Traction:         mins  46379;     Timed Treatment:   25   mins   Total Treatment:     25   mins  Jocelin Pizano PT    Electronically singed 10/31/2024      [place] PT license: [id number]  Physical Therapist  "

## 2024-11-07 ENCOUNTER — OFFICE VISIT (OUTPATIENT)
Dept: FAMILY MEDICINE CLINIC | Facility: CLINIC | Age: 64
End: 2024-11-07
Payer: COMMERCIAL

## 2024-11-07 ENCOUNTER — TREATMENT (OUTPATIENT)
Dept: PHYSICAL THERAPY | Facility: CLINIC | Age: 64
End: 2024-11-07
Payer: COMMERCIAL

## 2024-11-07 VITALS
WEIGHT: 152 LBS | HEART RATE: 82 BPM | SYSTOLIC BLOOD PRESSURE: 134 MMHG | TEMPERATURE: 98.6 F | HEIGHT: 63 IN | OXYGEN SATURATION: 97 % | DIASTOLIC BLOOD PRESSURE: 85 MMHG | BODY MASS INDEX: 26.93 KG/M2

## 2024-11-07 DIAGNOSIS — I10 PRIMARY HYPERTENSION: Primary | ICD-10-CM

## 2024-11-07 DIAGNOSIS — E78.2 MIXED HYPERLIPIDEMIA: ICD-10-CM

## 2024-11-07 DIAGNOSIS — Z87.81 S/P ORIF (OPEN REDUCTION INTERNAL FIXATION) FRACTURE: ICD-10-CM

## 2024-11-07 DIAGNOSIS — R26.9 GAIT DISTURBANCE: ICD-10-CM

## 2024-11-07 DIAGNOSIS — M25.561 RIGHT KNEE PAIN, UNSPECIFIED CHRONICITY: ICD-10-CM

## 2024-11-07 DIAGNOSIS — F41.0 PANIC ATTACKS: ICD-10-CM

## 2024-11-07 DIAGNOSIS — S82.041D CLOSED DISPLACED COMMINUTED FRACTURE OF RIGHT PATELLA WITH ROUTINE HEALING: Primary | ICD-10-CM

## 2024-11-07 DIAGNOSIS — M25.552 LEFT HIP PAIN: ICD-10-CM

## 2024-11-07 DIAGNOSIS — Z23 NEED FOR INFLUENZA VACCINATION: ICD-10-CM

## 2024-11-07 DIAGNOSIS — Z98.890 S/P ORIF (OPEN REDUCTION INTERNAL FIXATION) FRACTURE: ICD-10-CM

## 2024-11-07 PROCEDURE — 99214 OFFICE O/P EST MOD 30 MIN: CPT | Performed by: NURSE PRACTITIONER

## 2024-11-07 PROCEDURE — 90656 IIV3 VACC NO PRSV 0.5 ML IM: CPT | Performed by: NURSE PRACTITIONER

## 2024-11-07 PROCEDURE — 90471 IMMUNIZATION ADMIN: CPT | Performed by: NURSE PRACTITIONER

## 2024-11-07 RX ORDER — CLONAZEPAM 0.5 MG/1
0.5 TABLET ORAL 2 TIMES DAILY PRN
Qty: 30 TABLET | Refills: 0 | Status: SHIPPED | OUTPATIENT
Start: 2024-11-07

## 2024-11-07 RX ORDER — LISINOPRIL 10 MG/1
10 TABLET ORAL DAILY
Qty: 90 TABLET | Refills: 1 | Status: SHIPPED | OUTPATIENT
Start: 2024-11-07

## 2024-11-07 NOTE — PROGRESS NOTES
"Physical Therapy Daily Treatment Note  [address], [address] [place] [address]        Patient: Divina Virk   : 1960  Diagnosis/ICD-10 Code:  Closed displaced comminuted fracture of right patella with routine healing [S82.041D]  Referring practitioner: NADINE Varghese  Date of Initial Visit: Type: THERAPY  Noted: 7/10/2024  Today's Date: 2024  Patient seen for 21 sessions             Subjective     Divina Virk reports  having 4/10 pain in her Right knee upon arrival today.  She states that she is concerned regarding persistent pain and \"Popping\" at right knee cap.  She expresses frustration regarding persistent pain and difficulty with Squatting, being on her knees, and getting up/down off floor.    Objective       Active Range of Motion      Right Knee   Flexion: 140 degrees   Extension: 0 degrees     + Edema noted Reft knee around perimeter of patella    See Exercise and Manual Logs for complete treatment.       Assessment/Plan    Pt tolerated therapy session well - with progression of therapeutic exercises, CKC-Functional activities, and Manual therapy. She has improved, but continues to have deficits in Her Right Knee  Strength and Stability; limiting function and ability to perform ADLs without increased pain and difficulty at this time.  Pt now presents with Right knee ROM that is WFLs-WNLs.  Right knee strength has improved as well, pt now able to perform SLR without Extensor lag noted.  Verbal cues given to improve form during exercise performance for improved Quad activation.       Progress per Plan of Care- as tolerated- working to improve right hip/knee functional strength and decrease patellar pain.           Timed:  Manual Therapy:    5     mins  88482;  Therapeutic Exercise:    25     mins  20206;     Neuromuscular Warren:    0    mins  49645;    Therapeutic Activity:     10     mins  43876;     Gait Trainin     mins  03207;     Ultrasound:     0     mins  07193;  "   Electrical Stimulation:    0     mins  94665;  Iontophoresis     0     mins  97788    Untimed:  Electrical Stimulation:    0     mins  72565 ( );  Mechanical Traction:    0     mins  78922;   Fluidotherapy     0     mins  68527  Hot pack     0     mins  31530  Cold pack     0     mins  65367    Timed Treatment:   40   mins   Total Treatment:     40   mins        Ange Burgess PTA  Physical Therapist Assistant

## 2024-11-12 ENCOUNTER — TREATMENT (OUTPATIENT)
Dept: PHYSICAL THERAPY | Facility: CLINIC | Age: 64
End: 2024-11-12
Payer: COMMERCIAL

## 2024-11-12 DIAGNOSIS — Z98.890 S/P ORIF (OPEN REDUCTION INTERNAL FIXATION) FRACTURE: ICD-10-CM

## 2024-11-12 DIAGNOSIS — M25.561 RIGHT KNEE PAIN, UNSPECIFIED CHRONICITY: ICD-10-CM

## 2024-11-12 DIAGNOSIS — Z87.81 S/P ORIF (OPEN REDUCTION INTERNAL FIXATION) FRACTURE: ICD-10-CM

## 2024-11-12 DIAGNOSIS — R26.9 GAIT DISTURBANCE: ICD-10-CM

## 2024-11-12 DIAGNOSIS — S82.041D CLOSED DISPLACED COMMINUTED FRACTURE OF RIGHT PATELLA WITH ROUTINE HEALING: Primary | ICD-10-CM

## 2024-11-12 NOTE — PROGRESS NOTES
"Physical Therapy Daily Treatment Note  [address], [address] [place] [address]        Patient: Divina Virk   : 1960  Diagnosis/ICD-10 Code:  Closed displaced comminuted fracture of right patella with routine healing [S82.041D]  Referring practitioner: NADINE Varghese  Date of Initial Visit: Type: THERAPY  Noted: 7/10/2024  Today's Date: 2024  Patient seen for 22 sessions             Subjective     Divina Virk reports that she saw her primary care physician to discuss her left hip pain.  She reports that she continues to have persistent \"Pain\" and \"Popping\" in her right patella- knee.  She reports that she has increased pain and stiffness in her right knee after sitting for extended period of time. She states  that it takes her awhile to walk  normal after a long car ride.  She continues to express frustration regarding her inability to get up/down off floor without pain or difficulty.  She also continues to express desire to be able to get onto her knees for cleaning    Objective     + Audible/Palpable Crepitus noted Right knee during functional sit to stands and Step ups    See Exercise and Manual Logs for complete treatment.       Assessment/Plan    Pt tolerated therapy session well - with progression of therapeutic exercises, CKC-Functional activities, and Manual therapy. She has improved, but continues to have deficits in Her Right Hip/Knee  Strength and Stability; limiting function and ability to perform all ADLs without increased pain and difficulty at this time.  Pt now presents with Right knee ROM that is WFLs-WNLs.  Right knee strength has improved as well, pt now able to perform SLR without Extensor lag noted.  Verbal cues given to improve form during exercise performance for improved Quad activation.        Progress per Plan of Care- as tolerated- working to improve right hip/knee functional strength and decrease patellar pain and improve stability of  Right knee.           "     Timed:  Manual Therapy:    5     mins  42928;  Therapeutic Exercise:    23     mins  86274;     Neuromuscular Warren:    0    mins  74001;    Therapeutic Activity:     10     mins  65923;     Gait Trainin     mins  88566;     Ultrasound:     0     mins  03246;    Electrical Stimulation:    0     mins  01416;  Iontophoresis     0     mins  90231    Untimed:  Electrical Stimulation:    0     mins  20815 ( );  Mechanical Traction:    0     mins  60488;   Fluidotherapy     0     mins  92514  Hot pack     0     mins  02583  Cold pack     0     mins  96431    Timed Treatment:   38   mins   Total Treatment:     38   mins        Ange Burgess PTA  Physical Therapist Assistant

## 2024-11-19 ENCOUNTER — TREATMENT (OUTPATIENT)
Dept: PHYSICAL THERAPY | Facility: CLINIC | Age: 64
End: 2024-11-19
Payer: COMMERCIAL

## 2024-11-19 DIAGNOSIS — M25.561 RIGHT KNEE PAIN, UNSPECIFIED CHRONICITY: ICD-10-CM

## 2024-11-19 DIAGNOSIS — Z87.81 S/P ORIF (OPEN REDUCTION INTERNAL FIXATION) FRACTURE: ICD-10-CM

## 2024-11-19 DIAGNOSIS — R26.9 GAIT DISTURBANCE: ICD-10-CM

## 2024-11-19 DIAGNOSIS — S82.041D CLOSED DISPLACED COMMINUTED FRACTURE OF RIGHT PATELLA WITH ROUTINE HEALING: Primary | ICD-10-CM

## 2024-11-19 DIAGNOSIS — Z98.890 S/P ORIF (OPEN REDUCTION INTERNAL FIXATION) FRACTURE: ICD-10-CM

## 2024-11-19 NOTE — PROGRESS NOTES
"Physical Therapy Daily Treatment Note  [address], [address] [place] [address]        Patient: Divina Virk   : 1960  Diagnosis/ICD-10 Code:  Closed displaced comminuted fracture of right patella with routine healing [S82.041D]  Referring practitioner: NADINE Varghese  Date of Initial Visit: Type: THERAPY  Noted: 7/10/2024  Today's Date: 2024  Patient seen for 23 sessions             Subjective     Divina Virk reports having 5/10 pain in her right knee upon arrival today.  She reports that she continues to have more pain and \"Stiffness\" on cooler - rainy days.  She also reports having increased pain and stiffness in her right knee after sitting for extended period of time. She states  that it takes her awhile to walk  normal after a long car ride.  She continues to express frustration regarding her inability to get up/down off floor without pain or difficulty.     Objective       See Exercise and Manual Logs for complete treatment.       Assessment/Plan    Pt tolerated therapy session well - with progression of therapeutic exercises, CKC-Functional activities, and Manual therapy. She has improved, but continues to have deficits in Her Right Hip/Knee  Strength and Stability; limiting function and ability to perform all ADLs without increased pain and difficulty at this time.  Pt now presents with Right knee ROM that is WFLs-WNLs.   Verbal cues given to improve form during exercise performance to encourage and facilitate  Quad activation- working to improve overall strength and balance.        Progress per Plan of Care- as tolerated- working to improve right hip/knee functional strength and decrease patellar pain and improve stability of  Right knee.               Timed:  Manual Therapy:    0     mins  34629;  Therapeutic Exercise:    20     mins  73635;     Neuromuscular Warren:    0    mins  07244;    Therapeutic Activity:     15     mins  45309;     Gait Trainin     mins  " 26593;     Ultrasound:     0     mins  29120;    Electrical Stimulation:    0     mins  58677;  Iontophoresis     0     mins  30085    Untimed:  Electrical Stimulation:    0     mins  50422 ( );  Mechanical Traction:    0     mins  00237;   Fluidotherapy     0     mins  44039  Hot pack     0     mins  12860  Cold pack     0     mins  64178    Timed Treatment:   35   mins   Total Treatment:     35   mins        Ange Burgess PTA  Physical Therapist Assistant

## 2024-11-21 ENCOUNTER — TREATMENT (OUTPATIENT)
Dept: PHYSICAL THERAPY | Facility: CLINIC | Age: 64
End: 2024-11-21
Payer: COMMERCIAL

## 2024-11-21 DIAGNOSIS — S82.041D CLOSED DISPLACED COMMINUTED FRACTURE OF RIGHT PATELLA WITH ROUTINE HEALING: Primary | ICD-10-CM

## 2024-11-21 DIAGNOSIS — Z98.890 S/P ORIF (OPEN REDUCTION INTERNAL FIXATION) FRACTURE: ICD-10-CM

## 2024-11-21 DIAGNOSIS — Z87.81 S/P ORIF (OPEN REDUCTION INTERNAL FIXATION) FRACTURE: ICD-10-CM

## 2024-11-21 DIAGNOSIS — R26.9 GAIT DISTURBANCE: ICD-10-CM

## 2024-11-21 DIAGNOSIS — M25.561 RIGHT KNEE PAIN, UNSPECIFIED CHRONICITY: ICD-10-CM

## 2024-11-21 NOTE — PROGRESS NOTES
Re-Assessment / Re-Certification  [address], [address] [place] [address]      Patient: Divina Virk   : 1960  Diagnosis/ICD-10 Code:  Closed displaced comminuted fracture of right patella with routine healing [S82.041D]  Referring practitioner: NADINE Varghese  Date of Initial Visit: Type: THERAPY  Noted: 7/10/2024  Today's Date: 2024  Patient seen for 24 sessions    Progress note due: 2024  Re-cert due: 2025      Subjective:   Divina Virk reports: 2/10 R knee at beginning of session today.  Pt reports that about 30 days ago she started having L lateral hip pain.  Pt reports that it is painful to lie on the left side.  Pt reports that at worse her pain in the hip has been an 8/10 over the past week.  Pt states that she still has difficulty going down the stairs, squatting and is unable to kneel on her knee.  Pt reports difficulty with SLS on R.  Subjective Questionnaire: LEFS: 43    Subjective   Objective   Tenderness of L greater trochanter  Moderate swelling in R knee    Active Range of Motion   Right Knee   Flexion: 140 degrees   Extension: 0 degrees     L hip AROM WFL     Strength/Myotome Testing   Right Hip  Flexion: 4  Abduction: 4-    Left Hip  Flexion: 4-  Abduction: 4-    Glute activation poor bilaterally    Right Knee   Flexion: 4+  Extension: 4    Left knee flexion/extension: 5/5    Assessment & Plan       Assessment  Assessment details: Compared to last progress note the pt demonstrates improvements in R knee flexion strength.  Pt now reports pain in lateral L hip that began about a month ago.  Pt reports increased pain and difficulty along with weakness of R knee/L hip which is limiting stairs, prolonged standing and walking activities.  Pt has tenderness in L greater trochanter and weakness of bilateral hips, worse on L.  Pt demonstrates signs/symptoms consistent with L hip trochanteric bursitis.  Pt would continue to benefit from PT services in order to  address deficits including decreased R knee/bilateral hip strength, gait deficits and reports of R knee/L hip pain limiting function as shown on the LEFS.  Updated HEP given this session to address L hip deficits.    Goals  Plan Goals:  1. The patient has limited ROM of the right knee.   LTG 1: 12 weeks:  The patient will demonstrate 0 to 120 degrees of ROM for the right knee in order to allow patient to normalize gait.   STATUS:  Met   STG 1a: 6 weeks:  The patient will demonstrate 0 to 90 degrees of ROM for the right knee.   STATUS:  Met      2. The patient has limited strength of the right knee.   LTG 2: 6 weeks: The patient will demonstrate 5/5 strength for right knee flexion and extension in order to allow patient improved joint stability  STATUS: new  LTG 2: 12 weeks: The patient will demonstrate 4/5 strength for right knee flexion and extension in order to allow patient improved joint stability.   STATUS:Met  STG 2a: 6 weeks: The patient will demonstrate 3/5 strength for right knee flexion and extension   STATUS:Met      3. The patient has gait dysfunction.   LTG 3: 12 weeks:  The patient will ambulate without assistive device, independently, for community distances with minimal limp to the right lower extremity in order to improve mobility and allow patient to perform activities such as grocery shopping with greater ease.   STATUS: not met consistently, continue  STG 3a: The patient will be independent in HEP.   STATUS:  Met and progressing as appropriate      4. The patient complains of right knee pain.   LTG 4: 12 weeks:  The patient will report a pain rating of 2/10 or better in order to improve tolerance to activities of daily living and improve sleep quality.   STATUS:  not met, continue  STG 4a: 6weeks:  The patient will report a pain rating of 4/10 or better.   STATUS:  not met consistently, continue      5. Mobility: Walking/Moving Around Functional Limitation                   LTG 5: 12 weeks:  The  patient will demonstrate 25% limitation by achieving a score of 60/80 on the Lower Extremity Functional Scale.   STATUS: not met, continue  STG 5a: 6 weeks:  The patient will demonstrate 50% limitation by achieving a score of 40/80 on the Lower Extremity Functional Scale.     STATUS: Met    Hip goals:  1. The patient complains of L hip  pain.   LTG 1: 12 weeks:  The patient will report a pain rating of 1/10 or better in order to improve tolerance to activities of daily living and improve sleep quality.   STATUS:  new  STG 1a: 6 weeks:  The patient will report a pain rating of 3/10 or better.   STATUS:  new    2. The patient demonstrates B hip weakness  LTG 2: 12 weeks:  The patient will demonstrate 5/5 B hip flexion/abduction/extension strength to improve stability during ADLs  STATUS:  new  STG 2a: 6 weeks:  The patient will demonstrate 4+/5 B hip flexion/abduction/extension strength to improve stability during ADLs   STATUS:  new      Progress toward previous goals: Partially Met      Recommendations: Continue with recommendations add in hip dx and goals  Timeframe: 3 months  Prognosis to achieve goals: good    PT SIGNATURE: Gia Matias, PT     Electronically signed 2024  DATE TREATMENT INITIATED: 2024  [place] License: [id number]     Certification Period: 2024 thru 2025    Based upon review of the patient's progress and continued therapy plan, it is my medical opinion that Divina Virk should continue physical therapy treatment at [facility]  [address] [place] [id number] [phone].    Signature: __________________________________  Judy Feliciano APRN  NPI: [id number]    Timed:  Manual Therapy:    0     mins  25125;  Therapeutic Exercise:    30     mins  79536;     Neuromuscular Warren:    0    mins  55268;    Therapeutic Activity:     8     mins  89156;     Gait Trainin     mins  68865;     Ultrasound:     0     mins   08806;    Electrical Stimulation:    0     mins  82058 ( );    Untimed:  Electrical Stimulation:    0     mins  74263 ( );  Re-evaluation :    7     mins  30133;     Timed Treatment:   38   mins   Total Treatment:     45   mins

## 2024-11-25 ENCOUNTER — OFFICE VISIT (OUTPATIENT)
Dept: ORTHOPEDIC SURGERY | Facility: CLINIC | Age: 64
End: 2024-11-25
Payer: COMMERCIAL

## 2024-11-25 VITALS
WEIGHT: 152 LBS | HEIGHT: 64 IN | SYSTOLIC BLOOD PRESSURE: 116 MMHG | OXYGEN SATURATION: 95 % | BODY MASS INDEX: 25.95 KG/M2 | DIASTOLIC BLOOD PRESSURE: 75 MMHG | HEART RATE: 98 BPM

## 2024-11-25 DIAGNOSIS — Z98.890 S/P ORIF (OPEN REDUCTION INTERNAL FIXATION) FRACTURE: ICD-10-CM

## 2024-11-25 DIAGNOSIS — S82.041D CLOSED DISPLACED COMMINUTED FRACTURE OF RIGHT PATELLA WITH ROUTINE HEALING, SUBSEQUENT ENCOUNTER: ICD-10-CM

## 2024-11-25 DIAGNOSIS — Z87.81 S/P ORIF (OPEN REDUCTION INTERNAL FIXATION) FRACTURE: ICD-10-CM

## 2024-11-25 DIAGNOSIS — M25.561 RIGHT KNEE PAIN, UNSPECIFIED CHRONICITY: Primary | ICD-10-CM

## 2024-11-25 NOTE — PROGRESS NOTES
"Chief Complaint  Follow-up and Pain of the Right Knee    Subjective      Divina Virk presents to [facility] for follow up of her right knee.  Patient is status post open reduction internal fixation of the right patella performed Dr. Quintanilla on 6/15/24.  Patient arrives today independently ambulatory without the use of any assistive devices.  Patient has been attending physical therapy. States she feels great in the mornings, but depending on her activity level throughout the day, her pain is about a 5-7 by the end of the day. She is still working with PT, ROM and flexability doing well. They are starting to work on strength more and increasing to PT twice a week through December. She cannot drive longer than 30 minutes, so she has not been able to return to work.    Allergies   Allergen Reactions    Adhesive Tape Rash    Bupropion Unknown - Low Severity     Didn't like the way she felt taking       Objective     Vital Signs:   Vitals:    11/25/24 0914   BP: 116/75   Pulse: 98   SpO2: 95%   Weight: 68.9 kg (152 lb)   Height: 161.3 cm (63.5\")   PainSc:   8     Body mass index is 26.5 kg/m².    I reviewed the patient's chief complaint, history of present illness, review of systems, past medical history, surgical history, family history, social history, medications, and allergy list.     Ortho Exam  Knee   General: Alert, no acute distress.   Right knee: Quad atrophy visualized. Knee stable to varus/valgus stress.  Knee extensor mechanism intact. Full knee extension. Flexion to 130 degrees. Calf soft, non-tender.  Sensation and neurovascularly intact.  Demonstrates active ankle dorsiflexion and plantarflexion.  Palpable pedal pulses.           Imaging Results (Most Recent)       Procedure Component Value Units Date/Time    XR Knee 3 View Right [[id number]] Resulted: 11/25/24 0932     Updated: 11/25/24 0932    Narrative:      X-Ray Report:  Study: X-rays ordered, taken in the office, and " reviewed today  Site: Right knee xray  Indication: Right patella ORIF follow-up  View: AP, lateral right knee view(s)  Findings: Stable intact and well-healing right patella fracture.  Hardware   is intact without evidence of complications or loosening.  Prior studies available for comparison: yes                 Assessment and Plan   Diagnoses and all orders for this visit:    1. Right knee pain, unspecified chronicity (Primary)  -     XR Knee 3 View Right  -     Ambulatory Referral to Physical Therapy for Evaluation & Treatment    2. S/P ORIF (open reduction internal fixation) fracture  -     Ambulatory Referral to Physical Therapy for Evaluation & Treatment    3. Closed displaced comminuted fracture of right patella with routine healing, subsequent encounter  -     Ambulatory Referral to Physical Therapy for Evaluation & Treatment         Divina Virk presents to [facility] for her right knee.  X-rays obtained and reviewed with patient. Fracture is stable and well healing. Continue PT focusing on strengthening exercises. Patient questions about hardware removal and longevity - discussed that would need to be brought up and discussed with Dr Quintanilla at her next office visit.     Patient will follow up in 2 months. Will obtain xrays of the right knee at that time.       Tobacco Use: Medium Risk (11/25/2024)    Patient History     Smoking Tobacco Use: Former     Smokeless Tobacco Use: Never     Passive Exposure: Not on file     Patient reports they have a history of tobacco use; encouraged continued tobacco cessation for further health benefits.       Follow Up   Return in about 2 months (around 1/25/2025).  There are no Patient Instructions on file for this visit.    Patient was given instructions and counseling regarding her condition or for health maintenance advice. Please see specific information pulled into the AVS if appropriate.       Dictated Utilizing Dragon Dictation. Please  note that portions of this note were completed with a voice recognition program. Part of this note may be an electronic transcription/translation of spoken language to printed text using the Dragon Dictation System.

## 2024-12-04 ENCOUNTER — TREATMENT (OUTPATIENT)
Dept: PHYSICAL THERAPY | Facility: CLINIC | Age: 64
End: 2024-12-04
Payer: COMMERCIAL

## 2024-12-04 DIAGNOSIS — Z98.890 S/P ORIF (OPEN REDUCTION INTERNAL FIXATION) FRACTURE: ICD-10-CM

## 2024-12-04 DIAGNOSIS — Z87.81 S/P ORIF (OPEN REDUCTION INTERNAL FIXATION) FRACTURE: ICD-10-CM

## 2024-12-04 DIAGNOSIS — R26.9 GAIT DISTURBANCE: ICD-10-CM

## 2024-12-04 DIAGNOSIS — M25.561 RIGHT KNEE PAIN, UNSPECIFIED CHRONICITY: ICD-10-CM

## 2024-12-04 DIAGNOSIS — S82.041D CLOSED DISPLACED COMMINUTED FRACTURE OF RIGHT PATELLA WITH ROUTINE HEALING: Primary | ICD-10-CM

## 2024-12-04 PROCEDURE — 97110 THERAPEUTIC EXERCISES: CPT | Performed by: PHYSICAL THERAPIST

## 2024-12-04 PROCEDURE — 97530 THERAPEUTIC ACTIVITIES: CPT | Performed by: PHYSICAL THERAPIST

## 2024-12-04 NOTE — PROGRESS NOTES
Physical Therapy Daily Treatment Note  [address], [address], [place] [address]      Patient: Divina Virk   : 1960  Diagnosis/ICD-10 Code:  Closed displaced comminuted fracture of right patella with routine healing [S82.041D]  Referring practitioner: NADINE Varghese  Date of Initial Visit: Type: THERAPY  Noted: 7/10/2024  Today's Date: 2024  Patient seen for 25 sessions         Subjective   Divina Major reports: 3/10 R knee pain.  Pt reports that L hip pain has decreased.  Pt went to MD and pt states they discussed the possibility of taking out R knee hardware once it fully heals.    Objective   Mild ext lag on R  See Exercise, Manual, and Modality Logs for complete treatment.       Assessment/Plan  Patient tolerated all exercise progressions well today but continues to demonstrate R knee and bilateral hip strength deficits limiting function. Pt continues to require cues to prevent compensation.  Pt most limited in eccentric quad control.  Continue to progress per patient tolerance.    Progress per Plan of Care           Timed:  Manual Therapy:    0     mins  59282;  Therapeutic Exercise:    30     mins  79507;     Neuromuscular Warren:    0    mins  92726;    Therapeutic Activity:     8     mins  10706;     Gait Trainin     mins  47669;     Ultrasound:     0     mins  14945;    Electrical Stimulation:    0     mins  55931;  Iontophoresis     0     mins  37569    Untimed:  Electrical Stimulation:    0     mins  59817 ( );  Mechanical Traction:    0     mins  76553;   Fluidotherapy     0     mins  31019  Hot pack     0     Mins    Cold pack                       0     Mins     Timed Treatment:   38   mins   Total Treatment:     38   mins        Gia Matias PT    Electronically signed [unfilled]  [place] License: [id number]  NPI number: [id number]

## 2024-12-06 ENCOUNTER — TREATMENT (OUTPATIENT)
Dept: PHYSICAL THERAPY | Facility: CLINIC | Age: 64
End: 2024-12-06
Payer: COMMERCIAL

## 2024-12-06 DIAGNOSIS — M25.561 RIGHT KNEE PAIN, UNSPECIFIED CHRONICITY: ICD-10-CM

## 2024-12-06 DIAGNOSIS — R26.9 GAIT DISTURBANCE: ICD-10-CM

## 2024-12-06 DIAGNOSIS — Z98.890 S/P ORIF (OPEN REDUCTION INTERNAL FIXATION) FRACTURE: ICD-10-CM

## 2024-12-06 DIAGNOSIS — Z87.81 S/P ORIF (OPEN REDUCTION INTERNAL FIXATION) FRACTURE: ICD-10-CM

## 2024-12-06 DIAGNOSIS — S82.041D CLOSED DISPLACED COMMINUTED FRACTURE OF RIGHT PATELLA WITH ROUTINE HEALING: Primary | ICD-10-CM

## 2024-12-06 NOTE — PROGRESS NOTES
"Physical Therapy Daily Treatment Note  [email or web address], [address] [place] [address]        Patient: Divina Virk   : 1960  Diagnosis/ICD-10 Code:  Closed displaced comminuted fracture of right patella with routine healing [S82.041D]  Referring practitioner: NADINE Varghese  Date of Initial Visit: Type: THERAPY  Noted: 7/10/2024  Today's Date: 2024  Patient seen for 26 sessions             Subjective   Divina Virk reports having 2/10 pain - discomfort in her Right knee upon arrival.  She reports that she continues to have a lot of \"Grinding\" in her knee- patella.    Objective     + Scar adhesions noted mid Scar over patella    See Exercise Logs for complete treatment.       Assessment/Plan    Pt tolerated therapy session well - with progression of therapeutic exercises and  CKC-Functional activities. She has improved, but continues to have deficits in Her Bilateral  Hip and Right Knee  Strength and Stability; limiting function and ability to perform all ADLs without increased pain and difficulty at this time.    Verbal cues given to improve form during exercise performance to encourage and facilitate  Quad activation- working to improve overall strength and balance.        Progress per Plan of Care- as tolerated- working to improve Bilateral Hip and Right knee functional strength and decrease patellar pain and improve overall stability of  Right knee.               Timed:  Manual Therapy:    0     mins  76481;  Therapeutic Exercise:    30     mins  84854;     Neuromuscular Warren:    0    mins  91699;    Therapeutic Activity:     10     mins  71654;     Gait Trainin     mins  80072;     Ultrasound:     0     mins  52505;    Electrical Stimulation:    0     mins  80489;  Iontophoresis     0     mins  19197    Untimed:  Electrical Stimulation:    0     mins  05377 ( );  Mechanical Traction:    0     mins  35113;   Fluidotherapy     0     mins  71689  Hot pack     0     mins  " 50814  Cold pack     0     mins  24640    Timed Treatment:   40   mins   Total Treatment:     40   mins        Ange Burgess PTA  Physical Therapist Assistant

## 2024-12-11 ENCOUNTER — TREATMENT (OUTPATIENT)
Dept: PHYSICAL THERAPY | Facility: CLINIC | Age: 64
End: 2024-12-11
Payer: COMMERCIAL

## 2024-12-11 DIAGNOSIS — M25.561 RIGHT KNEE PAIN, UNSPECIFIED CHRONICITY: ICD-10-CM

## 2024-12-11 DIAGNOSIS — R26.9 GAIT DISTURBANCE: ICD-10-CM

## 2024-12-11 DIAGNOSIS — Z98.890 S/P ORIF (OPEN REDUCTION INTERNAL FIXATION) FRACTURE: ICD-10-CM

## 2024-12-11 DIAGNOSIS — S82.041D CLOSED DISPLACED COMMINUTED FRACTURE OF RIGHT PATELLA WITH ROUTINE HEALING: Primary | ICD-10-CM

## 2024-12-11 DIAGNOSIS — Z87.81 S/P ORIF (OPEN REDUCTION INTERNAL FIXATION) FRACTURE: ICD-10-CM

## 2024-12-11 NOTE — PROGRESS NOTES
" Physical Therapy Daily Treatment Note  [address], [address], [place] [address]      Patient: Divina Virk   : 1960  Diagnosis/ICD-10 Code:  Closed displaced comminuted fracture of right patella with routine healing [S82.041D]  Referring practitioner: NADINE Varghese  Date of Initial Visit: Type: THERAPY  Noted: 7/10/2024  Today's Date: 2024  Patient seen for 27 sessions             Subjective   Divina Major reports: mild R knee stiffness at beginning of session today.  Pt reports that knee has felt \"tighter\" for the past several days.      Objective   R knee flexion AROM: 131  See Exercise, Manual, and Modality Logs for complete treatment.     Assessment/Plan  Patient tolerated all exercise progressions well today but continues to demonstrate R knee and bilateral hip strength deficits limiting function. Pt requires cues to prevent compensation.  Pt most limited in eccentric quad control.  Continue to progress per patient tolerance.     Progress per Plan of Care         Timed:  Manual Therapy:    0     mins  50209;  Therapeutic Exercise:    30     mins  17228;     Neuromuscular Warren:    0    mins  75735;    Therapeutic Activity:     8     mins  91989;     Gait Trainin     mins  93891;     Ultrasound:     0     mins  60694;    Electrical Stimulation:    0     mins  94151;  Iontophoresis     0     mins  64061    Untimed:  Electrical Stimulation:    0     mins  72629 ( );  Mechanical Traction:    0     mins  97464;   Fluidotherapy     0     mins  82203  Hot pack     0     Mins    Cold pack                       0     Mins     Timed Treatment:   38   mins   Total Treatment:     44   mins        Gia Matias PT    Electronically signed [unfilled]  [place] License: [id number]  NPI number: [id number]  "

## 2024-12-12 RX ORDER — CHOLECALCIFEROL (VITAMIN D3) 50 MCG
2000 TABLET ORAL DAILY
Qty: 90 TABLET | Refills: 1 | Status: SHIPPED | OUTPATIENT
Start: 2024-12-12

## 2024-12-13 ENCOUNTER — TREATMENT (OUTPATIENT)
Dept: PHYSICAL THERAPY | Facility: CLINIC | Age: 64
End: 2024-12-13
Payer: COMMERCIAL

## 2024-12-13 DIAGNOSIS — Z87.81 S/P ORIF (OPEN REDUCTION INTERNAL FIXATION) FRACTURE: ICD-10-CM

## 2024-12-13 DIAGNOSIS — M25.561 RIGHT KNEE PAIN, UNSPECIFIED CHRONICITY: ICD-10-CM

## 2024-12-13 DIAGNOSIS — R26.9 GAIT DISTURBANCE: ICD-10-CM

## 2024-12-13 DIAGNOSIS — S82.041D CLOSED DISPLACED COMMINUTED FRACTURE OF RIGHT PATELLA WITH ROUTINE HEALING: Primary | ICD-10-CM

## 2024-12-13 DIAGNOSIS — Z98.890 S/P ORIF (OPEN REDUCTION INTERNAL FIXATION) FRACTURE: ICD-10-CM

## 2024-12-13 NOTE — PROGRESS NOTES
Physical Therapy Daily Treatment Note  [address], [address], [place] [address]      Patient: Divina Virk   : 1960  Diagnosis/ICD-10 Code:  Closed displaced comminuted fracture of right patella with routine healing [S82.041D]  Referring practitioner: NADINE Varghese  Date of Initial Visit: Type: THERAPY  Noted: 7/10/2024  Today's Date: 2024  Patient seen for 28 sessions           Subjective   Divina Major reports: mild R knee soreness/stiffness at beginning of session today.    Objective   R knee flexion AROM: 140  See Exercise, Manual, and Modality Logs for complete treatment.       Assessment/Plan  Patient tolerated all exercise progressions well today but continues to demonstrate R knee and bilateral hip strength deficits limiting function. Pt requires cues to prevent compensation.  Pt most limited in eccentric quad control but this is improved compared to last session.  Crepitus felt during patellar glides.  Continue to progress per patient tolerance.   Progress per Plan of Care           Timed:  Manual Therapy:    0     mins  86291;  Therapeutic Exercise:    30     mins  89752;     Neuromuscular Warren:    0    mins  43817;    Therapeutic Activity:     8     mins  57063;     Gait Trainin     mins  78728;     Ultrasound:     0     mins  64597;    Electrical Stimulation:    0     mins  87100;  Iontophoresis     0     mins  09099    Untimed:  Electrical Stimulation:    0     mins  66209 ( );  Mechanical Traction:    0     mins  91915;   Fluidotherapy     0     mins  65021  Hot pack     0     Mins    Cold pack                       0     Mins     Timed Treatment:   38   mins   Total Treatment:     38   mins        Gia Matias PT    Electronically signed [unfilled]  [place] License: [id number]  NPI number: [id number]

## 2024-12-16 ENCOUNTER — TREATMENT (OUTPATIENT)
Dept: PHYSICAL THERAPY | Facility: CLINIC | Age: 64
End: 2024-12-16
Payer: COMMERCIAL

## 2024-12-16 DIAGNOSIS — Z87.81 S/P ORIF (OPEN REDUCTION INTERNAL FIXATION) FRACTURE: ICD-10-CM

## 2024-12-16 DIAGNOSIS — S82.041D CLOSED DISPLACED COMMINUTED FRACTURE OF RIGHT PATELLA WITH ROUTINE HEALING: Primary | ICD-10-CM

## 2024-12-16 DIAGNOSIS — M25.561 RIGHT KNEE PAIN, UNSPECIFIED CHRONICITY: ICD-10-CM

## 2024-12-16 DIAGNOSIS — Z98.890 S/P ORIF (OPEN REDUCTION INTERNAL FIXATION) FRACTURE: ICD-10-CM

## 2024-12-16 DIAGNOSIS — R26.9 GAIT DISTURBANCE: ICD-10-CM

## 2024-12-16 PROCEDURE — 97110 THERAPEUTIC EXERCISES: CPT | Performed by: PHYSICAL THERAPIST

## 2024-12-16 PROCEDURE — 97530 THERAPEUTIC ACTIVITIES: CPT | Performed by: PHYSICAL THERAPIST

## 2024-12-16 NOTE — PROGRESS NOTES
Physical Therapy Daily Treatment Note  [address], [address], [place] [address]      Patient: Divina Virk   : 1960  Diagnosis/ICD-10 Code:  Closed displaced comminuted fracture of right patella with routine healing [S82.041D]  Referring practitioner: NADINE Varghese  Date of Initial Visit: Type: THERAPY  Noted: 7/10/2024  Today's Date: 2024  Patient seen for 29 sessions             Subjective   Divina Major reports: mild R knee stiffness at beginning of session today.    Objective   See Exercise, Manual, and Modality Logs for complete treatment.       Assessment/Plan  Patient tolerated all exercise progressions and manual therapy well today but continues to demonstrate L knee strength deficits limiting function. Pt continues to require cues to prevent compensation strategies.  Continue to progress per patient tolerance.    Progress per Plan of Care           Timed:  Manual Therapy:    0     mins  45982;  Therapeutic Exercise:    32     mins  91553;     Neuromuscular Warren:    0    mins  80876;    Therapeutic Activity:     8     mins  38501;     Gait Trainin     mins  32471;     Ultrasound:     0     mins  73467;    Electrical Stimulation:    0     mins  67764;  Iontophoresis     0     mins  28700    Untimed:  Electrical Stimulation:    0     mins  22044 (MC );  Mechanical Traction:    0     mins  23058;   Fluidotherapy     0     mins  91721  Hot pack     0     Mins    Cold pack                       0     Mins     Timed Treatment:   40   mins   Total Treatment:     40   mins        Gia Matias PT    Electronically signed [unfilled]  [place] License: [id number]  NPI number: [id number]

## 2024-12-19 ENCOUNTER — TREATMENT (OUTPATIENT)
Dept: PHYSICAL THERAPY | Facility: CLINIC | Age: 64
End: 2024-12-19
Payer: COMMERCIAL

## 2024-12-19 DIAGNOSIS — M25.561 RIGHT KNEE PAIN, UNSPECIFIED CHRONICITY: ICD-10-CM

## 2024-12-19 DIAGNOSIS — S82.041D CLOSED DISPLACED COMMINUTED FRACTURE OF RIGHT PATELLA WITH ROUTINE HEALING: Primary | ICD-10-CM

## 2024-12-19 DIAGNOSIS — R26.9 GAIT DISTURBANCE: ICD-10-CM

## 2024-12-19 DIAGNOSIS — Z87.81 S/P ORIF (OPEN REDUCTION INTERNAL FIXATION) FRACTURE: ICD-10-CM

## 2024-12-19 DIAGNOSIS — Z98.890 S/P ORIF (OPEN REDUCTION INTERNAL FIXATION) FRACTURE: ICD-10-CM

## 2024-12-19 NOTE — PROGRESS NOTES
Physical Therapy Daily Treatment Note  [address], [address], [place] [address]      Patient: Divina Virk   : 1960  Diagnosis/ICD-10 Code:  Closed displaced comminuted fracture of right patella with routine healing [S82.041D]  Referring practitioner: NADINE Varghese  Date of Initial Visit: Type: THERAPY  Noted: 7/10/2024  Today's Date: 2024  Patient seen for 30 sessions             Subjective   Divina Major reports: no pain today.  Pt reports that she continues to have soreness and cracking in R knee when walking.     Objective   See Exercise, Manual, and Modality Logs for complete treatment.       Assessment/Plan  Patient tolerated all exercise progressions well today but continues to demonstrate R knee strength/ROM deficits limiting function. Pt has crepitus during R patellar glides.  Ext lag improved today during SLR.  Continue to progress per patient tolerance.    Progress per Plan of Care           Timed:  Manual Therapy:    0     mins  24585;  Therapeutic Exercise:    30     mins  94180;     Neuromuscular Warren:    0    mins  72582;    Therapeutic Activity:     8     mins  17485;     Gait Trainin     mins  88981;     Ultrasound:     0     mins  36718;    Electrical Stimulation:    0     mins  93979;  Iontophoresis     0     mins  81945    Untimed:  Electrical Stimulation:    0     mins  17008 ( );  Mechanical Traction:    0     mins  51011;   Fluidotherapy     0     mins  64817  Hot pack     0     Mins    Cold pack                       0     Mins     Timed Treatment:   38   mins   Total Treatment:     42   mins        Gia Matias PT    Electronically signed [unfilled]  [place] License: [id number]  NPI number: [id number]

## 2024-12-23 ENCOUNTER — TREATMENT (OUTPATIENT)
Dept: PHYSICAL THERAPY | Facility: CLINIC | Age: 64
End: 2024-12-23
Payer: COMMERCIAL

## 2024-12-23 DIAGNOSIS — Z98.890 S/P ORIF (OPEN REDUCTION INTERNAL FIXATION) FRACTURE: ICD-10-CM

## 2024-12-23 DIAGNOSIS — Z87.81 S/P ORIF (OPEN REDUCTION INTERNAL FIXATION) FRACTURE: ICD-10-CM

## 2024-12-23 DIAGNOSIS — M25.561 RIGHT KNEE PAIN, UNSPECIFIED CHRONICITY: ICD-10-CM

## 2024-12-23 DIAGNOSIS — R26.9 GAIT DISTURBANCE: ICD-10-CM

## 2024-12-23 DIAGNOSIS — S82.041D CLOSED DISPLACED COMMINUTED FRACTURE OF RIGHT PATELLA WITH ROUTINE HEALING: Primary | ICD-10-CM

## 2024-12-23 PROCEDURE — 97110 THERAPEUTIC EXERCISES: CPT | Performed by: PHYSICAL THERAPIST

## 2024-12-23 PROCEDURE — 97530 THERAPEUTIC ACTIVITIES: CPT | Performed by: PHYSICAL THERAPIST

## 2024-12-23 NOTE — PROGRESS NOTES
"Progress note  [address], [address] [place] [address]      Patient: Divina Virk   : 1960  Diagnosis/ICD-10 Code:  Closed displaced comminuted fracture of right patella with routine healing [S82.041D]  Referring practitioner: NADINE Varghese  Date of Initial Visit: Type: THERAPY  Noted: 7/10/2024  Today's Date: 2024  Patient seen for 31 sessions        Subjective:   Divina Virk reports: 2/10 R knee pain at beginning of session.  Pt reports that she has increased R knee pain at night.  Pt states that she still has difficulty walking for long distances, completing stairs, squatting and kneeling due to knee pain.  Pt states that intermittently her R knee \"feels like something is ripping\".  Pt reports that she has noticed improvements in overall strength and some mild improvements in pain compared to a month ago.  LEFS: 25  Subjective   Objective   Active Range of Motion   Right Knee   Flexion: 130 degrees   Extension: 2 degrees hyperextension      Strength/Myotome Testing   Right Hip  Flexion: 4+  Abduction: 4    Left Hip  Flexion: 4+  Abduction: 4+     Right Knee   Flexion: 5  Extension: 4+  Assessment & Plan       Assessment  Assessment details: Compared to last progress note the pt demonstrates improvements in R knee/bilateral hip strength and reports improvements in overall pain.  Pt however continues to demonstrate R knee extension and bilateral hip weakness limiting stability during functional activities.  Pt demonstrates the most functional weakness during eccentric tasks such as descending stairs but this has improved.  Pt continues to report R knee aching pain limiting activities such as prolonged walking.  Pt would continue to benefit from skilled PT services in order to address remaining deficits limiting function at this time.         Goals  Plan Goals:  1. The patient has limited ROM of the right knee.   LTG 1: 12 weeks:  The patient will demonstrate 0 to 120 degrees of ROM " for the right knee in order to allow patient to normalize gait.   STATUS:  Met   STG 1a: 6 weeks:  The patient will demonstrate 0 to 90 degrees of ROM for the right knee.   STATUS:  Met      2. The patient has limited strength of the right knee.   LTG 2: 6 weeks: The patient will demonstrate 5/5 strength for right knee flexion and extension in order to allow patient improved joint stability  STATUS: partially met, continue  LTG 2: 12 weeks: The patient will demonstrate 4/5 strength for right knee flexion and extension in order to allow patient improved joint stability.   STATUS:met  STG 2a: 6 weeks: The patient will demonstrate 3/5 strength for right knee flexion and extension   STATUS:Met      3. The patient has gait dysfunction.   LTG 3: 12 weeks:  The patient will ambulate without assistive device, independently, for community distances with minimal limp to the right lower extremity in order to improve mobility and allow patient to perform activities such as grocery shopping with greater ease.   STATUS: not met consistently, continue  STG 3a: The patient will be independent in HEP.   STATUS:  Met and progressing as appropriate      4. The patient complains of right knee pain.   LTG 4: 12 weeks:  The patient will report a pain rating of 2/10 or better in order to improve tolerance to activities of daily living and improve sleep quality.   STATUS:  not met, continue  STG 4a: 6weeks:  The patient will report a pain rating of 4/10 or better.   STATUS:  not met consistently, continue      5. Mobility: Walking/Moving Around Functional Limitation                   LTG 5: 12 weeks:  The patient will demonstrate 25% limitation by achieving a score of 60/80 on the Lower Extremity Functional Scale.   STATUS: not met, continue  STG 5a: 6 weeks:  The patient will demonstrate 50% limitation by achieving a score of 40/80 on the Lower Extremity Functional Scale.     STATUS: not met, continue     Hip goals:  1. The patient  complains of L hip  pain.   LTG 1: 12 weeks:  The patient will report a pain rating of 1/10 or better in order to improve tolerance to activities of daily living and improve sleep quality.   STATUS:  ongoing  STG 1a: 6 weeks:  The patient will report a pain rating of 3/10 or better.   STATUS:  met     2. The patient demonstrates B hip weakness  LTG 2: 12 weeks:  The patient will demonstrate 5/5 B hip flexion/abduction/extension strength to improve stability during ADLs  STATUS:  not met, continue  STG 2a: 6 weeks:  The patient will demonstrate 4+/5 B hip flexion/abduction/extension strength to improve stability during ADLs   STATUS:  partially met, continue        Progress toward previous goals: Partially Met      Recommendations: Continue as planned  Timeframe: 1 month  Prognosis to achieve goals: good    PT SIGNATURE: Gia Matias, PT     Electronically signed 2024  DATE TREATMENT INITIATED: 2024  [place] License: [id number]      Based upon review of the patient's progress and continued therapy plan, it is my medical opinion that Divina Virk should continue physical therapy treatment at [facility] PH[facility]   [facility] [address] [id number] [phone].      Timed:  Manual Therapy:    0     mins  81888;  Therapeutic Exercise:    22     mins  77168;     Neuromuscular Warren:    0    mins  02809;    Therapeutic Activity:     8     mins  82430;     Gait Trainin     mins  38608;     Ultrasound:     0     mins  89157;    Electrical Stimulation:    0     mins  71394 ( );    Untimed:  Electrical Stimulation:    0     mins  97233 ( );  Mechanical Traction:    0     mins  34129;     Timed Treatment:   30   mins   Total Treatment:     37   mins    3

## 2024-12-30 ENCOUNTER — TREATMENT (OUTPATIENT)
Dept: PHYSICAL THERAPY | Facility: CLINIC | Age: 64
End: 2024-12-30
Payer: COMMERCIAL

## 2024-12-30 DIAGNOSIS — Z87.81 S/P ORIF (OPEN REDUCTION INTERNAL FIXATION) FRACTURE: ICD-10-CM

## 2024-12-30 DIAGNOSIS — R26.9 GAIT DISTURBANCE: ICD-10-CM

## 2024-12-30 DIAGNOSIS — Z98.890 S/P ORIF (OPEN REDUCTION INTERNAL FIXATION) FRACTURE: ICD-10-CM

## 2024-12-30 DIAGNOSIS — M25.561 RIGHT KNEE PAIN, UNSPECIFIED CHRONICITY: ICD-10-CM

## 2024-12-30 DIAGNOSIS — S82.041D CLOSED DISPLACED COMMINUTED FRACTURE OF RIGHT PATELLA WITH ROUTINE HEALING: Primary | ICD-10-CM

## 2024-12-30 PROCEDURE — 97530 THERAPEUTIC ACTIVITIES: CPT | Performed by: PHYSICAL THERAPIST

## 2024-12-30 PROCEDURE — 97110 THERAPEUTIC EXERCISES: CPT | Performed by: PHYSICAL THERAPIST

## 2024-12-30 NOTE — PROGRESS NOTES
Physical Therapy Daily Treatment Note  [address], [address] [place] [address]        Patient: Divina Virk   : 1960  Diagnosis/ICD-10 Code:  Closed displaced comminuted fracture of right patella with routine healing [S82.041D]  Referring practitioner: NADINE Varghese  Date of Initial Visit: Type: THERAPY  Noted: 7/10/2024  Today's Date: 2024  Patient seen for 32 sessions             Subjective   Divina Virk reports that her knee feels stronger, but states her knee continues to pop and grind a lot.    Objective     Right Hip Abductor Strength:  4/5  (Mild pain/discomfort reported Right lateral hip with MMT)    See Exercise  Logs for complete treatment.       Assessment/Plan    Pt tolerated therapy session well - with progression of therapeutic exercises and  CKC-Functional activities. She has improved, but continues to have deficits in Her Bilateral  Hip and Right Knee  Strength and Stability; limiting function and ability to perform all ADLs without increased pain and difficulty at this time.    Verbal cues given to improve form during exercise performance to encourage and facilitate  Quad activation- working to improve overall strength and balance.        Progress per Plan of Care- as tolerated- working to improve Bilateral Hip and Right knee functional strength and decrease patellar pain and improve overall stability of  Right knee.               Timed:  Manual Therapy:    0     mins  43707;  Therapeutic Exercise:    28     mins  39678;     Neuromuscular Warren:    0    mins  36882;    Therapeutic Activity:     10     mins  02182;     Gait Trainin     mins  81946;     Ultrasound:     0     mins  16228;    Electrical Stimulation:    0     mins  45486;  Iontophoresis     0     mins  24923    Untimed:  Electrical Stimulation:    0     mins  76883 ( );  Mechanical Traction:    0     mins  86439;   Fluidotherapy     0     mins  20532  Hot pack     0     mins  70716  Cold  pack     0     mins  59814    Timed Treatment:   38   mins   Total Treatment:     38   mins        Ange Burgess PTA  Physical Therapist Assistant

## 2025-01-08 ENCOUNTER — TREATMENT (OUTPATIENT)
Dept: PHYSICAL THERAPY | Facility: CLINIC | Age: 65
End: 2025-01-08
Payer: COMMERCIAL

## 2025-01-08 DIAGNOSIS — S82.041D CLOSED DISPLACED COMMINUTED FRACTURE OF RIGHT PATELLA WITH ROUTINE HEALING: Primary | ICD-10-CM

## 2025-01-08 DIAGNOSIS — R26.9 GAIT DISTURBANCE: ICD-10-CM

## 2025-01-08 DIAGNOSIS — Z87.81 S/P ORIF (OPEN REDUCTION INTERNAL FIXATION) FRACTURE: ICD-10-CM

## 2025-01-08 DIAGNOSIS — M25.561 RIGHT KNEE PAIN, UNSPECIFIED CHRONICITY: ICD-10-CM

## 2025-01-08 DIAGNOSIS — Z98.890 S/P ORIF (OPEN REDUCTION INTERNAL FIXATION) FRACTURE: ICD-10-CM

## 2025-01-08 PROCEDURE — 97530 THERAPEUTIC ACTIVITIES: CPT | Performed by: PHYSICAL THERAPIST

## 2025-01-08 PROCEDURE — 97110 THERAPEUTIC EXERCISES: CPT | Performed by: PHYSICAL THERAPIST

## 2025-01-08 NOTE — PROGRESS NOTES
" Physical Therapy Daily Treatment Note  [address], [address], [place] [address]      Patient: Divina Virk   : 1960  Diagnosis/ICD-10 Code:  Closed displaced comminuted fracture of right patella with routine healing [S82.041D]  Referring practitioner: No ref. provider found  Date of Initial Visit: Type: THERAPY  Noted: 7/10/2024  Today's Date: 2025  Patient seen for 33 sessions             Subjective   Divina Major reports: mild R knee pain today.  Pt reports that she continues to feel \" crunching\" in R knee.    Objective   See Exercise, Manual, and Modality Logs for complete treatment.       Assessment/Plan  Patient tolerated all exercise progressions well today but continues to demonstrate R knee/hip strength deficits limiting function.  Crepitus of R knee/patella noted during knee flexion/extension AROM.  Continue to progress per patient tolerance.    Progress per Plan of Care           Timed:  Manual Therapy:    0     mins  32707;  Therapeutic Exercise:    30     mins  56955;     Neuromuscular Warren:    0    mins  99297;    Therapeutic Activity:     8     mins  44197;     Gait Trainin     mins  06534;     Ultrasound:     0     mins  23943;    Electrical Stimulation:    0     mins  97669;  Iontophoresis     0     mins  90077    Untimed:  Electrical Stimulation:    0     mins  00061 ( );  Mechanical Traction:    0     mins  13475;   Fluidotherapy     0     mins  17482  Hot pack     0     Mins    Cold pack                       0     Mins     Timed Treatment:   38   mins   Total Treatment:     38   mins        Gia Matias PT    Electronically signed [unfilled]  [place] License: [id number]  NPI number: [id number]  "

## 2025-01-15 ENCOUNTER — TREATMENT (OUTPATIENT)
Dept: PHYSICAL THERAPY | Facility: CLINIC | Age: 65
End: 2025-01-15
Payer: COMMERCIAL

## 2025-01-15 DIAGNOSIS — Z87.81 S/P ORIF (OPEN REDUCTION INTERNAL FIXATION) FRACTURE: ICD-10-CM

## 2025-01-15 DIAGNOSIS — M25.561 RIGHT KNEE PAIN, UNSPECIFIED CHRONICITY: ICD-10-CM

## 2025-01-15 DIAGNOSIS — R26.9 GAIT DISTURBANCE: ICD-10-CM

## 2025-01-15 DIAGNOSIS — Z98.890 S/P ORIF (OPEN REDUCTION INTERNAL FIXATION) FRACTURE: ICD-10-CM

## 2025-01-15 DIAGNOSIS — S82.041D CLOSED DISPLACED COMMINUTED FRACTURE OF RIGHT PATELLA WITH ROUTINE HEALING: Primary | ICD-10-CM

## 2025-01-15 NOTE — PROGRESS NOTES
" Physical Therapy Daily Treatment Note  [address], [address], [place] [address]      Patient: Divina Virk   : 1960  Diagnosis/ICD-10 Code:  Closed displaced comminuted fracture of right patella with routine healing [S82.041D]  Referring practitioner: NADINE Batista  Date of Initial Visit: Type: THERAPY  Noted: 7/10/2024  Today's Date: 1/15/2025  Patient seen for 34 sessions           Subjective   Divina Virk reports: increased R knee pain at beginning of session today.  Pt reports that she is feeling increased \"clunking\" in R knee during ADLs associated with worsening pain.    Objective   Abnormal movement of R patella during medial/lateral glides with associated popping  See Exercise, Manual, and Modality Logs for complete treatment.       Assessment/Plan  Pt limited in exercises today due to pain.  Pt continues to demonstrate R knee swelling and functional weakness during activities such as step ups and sit to stands.  Exercises unable to be progressed last several sessions due to pain.  Re-assess pt next session.  Continue to progress per patient tolerance to improve functional strength.  Progress per Plan of Care           Timed:  Manual Therapy:    0     mins  16628;  Therapeutic Exercise:    22     mins  67821;     Neuromuscular Warren:    0    mins  83718;    Therapeutic Activity:     8     mins  85302;     Gait Trainin     mins  68227;     Ultrasound:     0     mins  79589;    Electrical Stimulation:    0     mins  39028;  Iontophoresis     0     mins  12700    Untimed:  Electrical Stimulation:    0     mins  86970 (MC );  Mechanical Traction:    0     mins  75734;   Fluidotherapy     0     mins  52240  Hot pack     0     Mins    Cold pack                       0     Mins     Timed Treatment:   30   mins   Total Treatment:     37   mins        Gia Matias PT    Electronically signed [unfilled]  [place] License: [id number]  NPI number: [id number]  "

## 2025-01-20 ENCOUNTER — TREATMENT (OUTPATIENT)
Dept: PHYSICAL THERAPY | Facility: CLINIC | Age: 65
End: 2025-01-20
Payer: COMMERCIAL

## 2025-01-20 DIAGNOSIS — Z87.81 S/P ORIF (OPEN REDUCTION INTERNAL FIXATION) FRACTURE: ICD-10-CM

## 2025-01-20 DIAGNOSIS — R26.9 GAIT DISTURBANCE: ICD-10-CM

## 2025-01-20 DIAGNOSIS — M25.561 RIGHT KNEE PAIN, UNSPECIFIED CHRONICITY: ICD-10-CM

## 2025-01-20 DIAGNOSIS — S82.041D CLOSED DISPLACED COMMINUTED FRACTURE OF RIGHT PATELLA WITH ROUTINE HEALING: Primary | ICD-10-CM

## 2025-01-20 DIAGNOSIS — Z98.890 S/P ORIF (OPEN REDUCTION INTERNAL FIXATION) FRACTURE: ICD-10-CM

## 2025-01-20 PROCEDURE — 97530 THERAPEUTIC ACTIVITIES: CPT | Performed by: PHYSICAL THERAPIST

## 2025-01-20 PROCEDURE — 97110 THERAPEUTIC EXERCISES: CPT | Performed by: PHYSICAL THERAPIST

## 2025-01-20 NOTE — PROGRESS NOTES
Physical Therapy Daily Treatment Note  [address], [address], [place] [address]      Patient: Divina Virk   : 1960  Diagnosis/ICD-10 Code:  Closed displaced comminuted fracture of right patella with routine healing [S82.041D]  Referring practitioner: NADINE Batista  Date of Initial Visit: Type: THERAPY  Noted: 7/10/2024  Today's Date: 2025  Patient seen for 35 sessions           Subjective   Divina Virk reports: 2-3/10 R knee pain today.  Pt states that she continues to notice R knee weakness and is having difficulty getting on/off the floor/low surfaces.      Objective   R knee MMT  Flexion: 5  Extension: 5    R knee AROM: 0-134 degrees    See Exercise, Manual, and Modality Logs for complete treatment.       Assessment/Plan  Patient limited in tolerance of exercises today due to pain.  Pt demonstrates improvements in R knee strength/ROM objective measurements but continues to report increased pain limiting function.  Palpable grinding/clicking felt in R knee with flexion/extension ROM.  Pt will be place on hold until MD follow up due to limited tolerance of exercises.  Continue to progress per patient tolerance.    Progress per Plan of Care           Timed:  Manual Therapy:    0     mins  58743;  Therapeutic Exercise:    15     mins  51935;     Neuromuscular Warren:    0    mins  67645;    Therapeutic Activity:     8     mins  67940;     Gait Trainin     mins  59895;     Ultrasound:     0     mins  96601;    Electrical Stimulation:    0     mins  11791;  Iontophoresis     0     mins  42299    Untimed:  Electrical Stimulation:    0     mins  82409 ( );  Mechanical Traction:    0     mins  28171;   Fluidotherapy     0     mins  64498  Hot pack     0     Mins    Cold pack                       0     Mins     Timed Treatment:   23   mins   Total Treatment:     25   mins        Gia Matias PT    Electronically signed [unfilled]  [place] License: [id number]  NPI number: [id number]

## 2025-01-27 ENCOUNTER — OFFICE VISIT (OUTPATIENT)
Dept: ORTHOPEDIC SURGERY | Facility: CLINIC | Age: 65
End: 2025-01-27
Payer: COMMERCIAL

## 2025-01-27 VITALS
BODY MASS INDEX: 26.53 KG/M2 | OXYGEN SATURATION: 96 % | HEART RATE: 99 BPM | HEIGHT: 64 IN | SYSTOLIC BLOOD PRESSURE: 169 MMHG | DIASTOLIC BLOOD PRESSURE: 84 MMHG | WEIGHT: 155.4 LBS

## 2025-01-27 DIAGNOSIS — M25.561 RIGHT KNEE PAIN, UNSPECIFIED CHRONICITY: Primary | ICD-10-CM

## 2025-01-27 DIAGNOSIS — Z87.81 S/P ORIF (OPEN REDUCTION INTERNAL FIXATION) FRACTURE: ICD-10-CM

## 2025-01-27 DIAGNOSIS — Z98.890 S/P ORIF (OPEN REDUCTION INTERNAL FIXATION) FRACTURE: ICD-10-CM

## 2025-01-27 PROCEDURE — 99214 OFFICE O/P EST MOD 30 MIN: CPT | Performed by: ORTHOPAEDIC SURGERY

## 2025-01-27 NOTE — PROGRESS NOTES
"Chief Complaint  Follow-up of the Right Knee     Subjective      Divina Virk presents to [facility] for follow up of her right knee. She had an open reduction internal fixation of the right patella performed on 6/15/24 She is having some small problems like there is a ripping or pulling.  She feels she hurt a Clang. She has pain with ROM of the knee.  She has had no new injury or fall.  She has mild swelling of the right knee.  She is in physical therapy. She still notes decrease strength.     Allergies   Allergen Reactions    Adhesive Tape Rash    Bupropion Unknown - Low Severity     Didn't like the way she felt taking        Social History     Socioeconomic History    Marital status:    Tobacco Use    Smoking status: Former     Current packs/day: 0.00     Average packs/day: 1 pack/day for 35.0 years (35.0 ttl pk-yrs)     Types: Cigarettes     Start date: 1983     Quit date: 2018     Years since quittin.1    Smokeless tobacco: Never    Tobacco comments:     N/A   Vaping Use    Vaping status: Never Used   Substance and Sexual Activity    Alcohol use: Never    Drug use: Never    Sexual activity: Never        I reviewed the patient's chief complaint, history of present illness, review of systems, past medical history, surgical history, family history, social history, medications, and allergy list.     Review of Systems     Constitutional: Denies fevers, chills, weight loss  Cardiovascular: Denies chest pain, shortness of breath  Skin: Denies rashes, acute skin changes  Neurologic: Denies headache, loss of consciousness      Vital Signs:   /84   Pulse 99   Ht 161.3 cm (63.5\")   Wt 70.5 kg (155 lb 6.4 oz)   SpO2 96%   BMI 27.10 kg/m²          Physical Exam  General: Alert. No acute distress    Ortho Exam        Right knee:  Well healed surgical scar.  Knee stable to varus/valgus stress.  Knee extensor mechanism intact. Full knee extension. Flexion to " 120 degrees. Calf soft, non-tender.  Sensation and neurovascularly intact.  Demonstrates active ankle dorsiflexion and plantarflexion.  Palpable pedal pulses. Screw head prominent on the medial aspect of the knee.  Mild swelling.       Procedures        Imaging Results (Most Recent)       Procedure Component Value Units Date/Time    XR Knee 3 View Right [[id number]] Resulted: 01/27/25 1005     Updated: 01/27/25 1010             Result Review :     X-Ray Report:  Right knee X-Ray  Indication: Evaluation of the right knee  AP/Lateral and [place] view(s)  Findings: Moderate patella femoral arthritis.  Intact ORIF of patella fracture.  No signs of loosening, subsidence or periprosthetic fracture.   Prior studies available for comparison: yes             Assessment and Plan     Diagnoses and all orders for this visit:    1. Right knee pain, unspecified chronicity (Primary)  -     XR Knee 3 View Right    2. S/P ORIF (open reduction internal fixation) fracture        Discussed the treatment plan with the patient. I reviewed the X-rays that were obtained today with the patient.     Discussed the treatment options with the patient, operative vs non-operative. Discussed excision of the hardware.  I would like to wait until postoperatively a year.      Continue physical therapy.  She is starting to get degenerative changes of the right knee.     HEP discussed for the right knee.      Call or return if worsening symptoms.    Follow Up     PRN      Patient was given instructions and counseling regarding her condition or for health maintenance advice. Please see specific information pulled into the AVS if appropriate.     Scribed for Salinas Quintanilla MD by Clara Galdamez MA.  01/27/25   10:06 EST      I have personally performed the services described in this document as scribed by the above individual and it is both accurate and complete. Salinas Quintanilla MD 01/27/25

## 2025-01-29 ENCOUNTER — TREATMENT (OUTPATIENT)
Dept: PHYSICAL THERAPY | Facility: CLINIC | Age: 65
End: 2025-01-29
Payer: COMMERCIAL

## 2025-01-29 DIAGNOSIS — S82.041D CLOSED DISPLACED COMMINUTED FRACTURE OF RIGHT PATELLA WITH ROUTINE HEALING: Primary | ICD-10-CM

## 2025-01-29 DIAGNOSIS — Z87.81 S/P ORIF (OPEN REDUCTION INTERNAL FIXATION) FRACTURE: ICD-10-CM

## 2025-01-29 DIAGNOSIS — M25.561 RIGHT KNEE PAIN, UNSPECIFIED CHRONICITY: ICD-10-CM

## 2025-01-29 DIAGNOSIS — R26.9 GAIT DISTURBANCE: ICD-10-CM

## 2025-01-29 DIAGNOSIS — Z98.890 S/P ORIF (OPEN REDUCTION INTERNAL FIXATION) FRACTURE: ICD-10-CM

## 2025-01-29 NOTE — PROGRESS NOTES
Progress note  [address] [facility], [address] [place] [address]      Patient: Divina Virk   : 1960  Diagnosis/ICD-10 Code:  Closed displaced comminuted fracture of right patella with routine healing [S82.041D]  Referring practitioner: NADINE Batista  Date of Initial Visit: Type: THERAPY  Noted: 7/10/2024  Today's Date: 2025  Patient seen for 36 sessions        Subjective:   Divina Virk reports: mild/moderate R knee pain at beginning of session today.  Pt reports that she has noticed increased R knee pain over the past month during ADLs limiting prolonged walking, stairs and other ADLs.  Pt returned to MD who wishes to hold another possible sx for R knee until July.      Subjective   Objective   Active Range of Motion   Right Knee   Flexion: 130 degrees   Extension: 0 degrees     Strength/Myotome Testing   Right Hip  Flexion: 5  Abduction: 5  Extension: 5     Right Knee   Flexion: 5  Extension: 5  Assessment & Plan       Assessment  Assessment details: Compared to last progress note the pt demonstrates improvements in bilateral hip and R knee strength.  Pt has met all strength/ROM goals but reports no improvements in overall pain and function during ADLs.  Pt demonstrates decreased tolerance to exercises/therapy activities due to pain.  Due to meeting all objective goals with lack of progress towards pain/functional goals the pt will be discharged at this time.  Pt is comfortable with this plan and will continue HEP.      Goals  Plan Goals: 1. The patient has limited ROM of the right knee.   LTG 1: 12 weeks:  The patient will demonstrate 0 to 120 degrees of ROM for the right knee in order to allow patient to normalize gait.   STATUS:  Met   STG 1a: 6 weeks:  The patient will demonstrate 0 to 90 degrees of ROM for the right knee.   STATUS:  Met      2. The patient has limited strength of the right knee.   LTG 2: 6 weeks: The patient will demonstrate 5/5 strength for right knee flexion and  extension in order to allow patient improved joint stability  STATUS: met  LTG 2: 12 weeks: The patient will demonstrate 4/5 strength for right knee flexion and extension in order to allow patient improved joint stability.   STATUS:met  STG 2a: 6 weeks: The patient will demonstrate 3/5 strength for right knee flexion and extension   STATUS:Met      3. The patient has gait dysfunction.   LTG 3: 12 weeks:  The patient will ambulate without assistive device, independently, for community distances with minimal limp to the right lower extremity in order to improve mobility and allow patient to perform activities such as grocery shopping with greater ease.   STATUS: not met consistently  STG 3a: The patient will be independent in HEP.   STATUS:  Met      4. The patient complains of right knee pain.   LTG 4: 12 weeks:  The patient will report a pain rating of 2/10 or better in order to improve tolerance to activities of daily living and improve sleep quality.   STATUS:  not met  STG 4a: 6weeks:  The patient will report a pain rating of 4/10 or better.   STATUS:  not met consistently      5. Mobility: Walking/Moving Around Functional Limitation                   LTG 5: 12 weeks:  The patient will demonstrate 25% limitation by achieving a score of 60/80 on the Lower Extremity Functional Scale.   STATUS: not met  STG 5a: 6 weeks:  The patient will demonstrate 50% limitation by achieving a score of 40/80 on the Lower Extremity Functional Scale.     STATUS: not met     Hip goals:  1. The patient complains of L hip  pain.   LTG 1: 12 weeks:  The patient will report a pain rating of 1/10 or better in order to improve tolerance to activities of daily living and improve sleep quality.   STATUS:  not met  STG 1a: 6 weeks:  The patient will report a pain rating of 3/10 or better.   STATUS:  met     2. The patient demonstrates B hip weakness  LTG 2: 12 weeks:  The patient will demonstrate 5/5 B hip flexion/abduction/extension strength  to improve stability during ADLs  STATUS:  met  STG 2a: 6 weeks:  The patient will demonstrate 4+/5 B hip flexion/abduction/extension strength to improve stability during ADLs   STATUS:  met      Progress toward previous goals: Partially Met        Recommendations: Discharge    PT SIGNATURE: Gia Matias, PT     Electronically signed 2025  DATE TREATMENT INITIATED: 2025  [place] License: [id number]      Based upon review of the patient's progress and continued therapy plan, it is my medical opinion that Divina Virk should continue physical therapy treatment at [facility]  [address] [place] [id number] [phone].      Timed:  Manual Therapy:    0     mins  17385;  Therapeutic Exercise:    15     mins  90960;     Neuromuscular [place]:    0    mins  92521;    Therapeutic Activity:     0     mins  68417;     Gait Trainin     mins  96513;     Ultrasound:     0     mins  22219;    Electrical Stimulation:    0     mins  98889 ( );    Untimed:  Electrical Stimulation:    0     mins  97478 ( );  Mechanical Traction:    0     mins  31724;     Timed Treatment:   15   mins   Total Treatment:     28   mins

## 2025-03-03 DIAGNOSIS — F41.0 PANIC ATTACKS: ICD-10-CM

## 2025-03-03 DIAGNOSIS — R45.89 ANXIETY ABOUT HEALTH: ICD-10-CM

## 2025-03-03 RX ORDER — ESCITALOPRAM OXALATE 5 MG/1
5 TABLET ORAL DAILY
Qty: 90 TABLET | Refills: 1 | Status: SHIPPED | OUTPATIENT
Start: 2025-03-03

## 2025-03-06 NOTE — PROGRESS NOTES
Chief Complaint    Annual Exam  Annual Exam and Hypertension (Follow up)    Subjective          Divina Virk is a 64 y.o. female who presents to [facility]    Annual Exam    History of Present Illness  The patient presents for an annual exam and evaluation of hypertension.    She reports satisfactory control of her blood pressure, with the exception of a recent episode of elevated readings towards the end of the day yesterday. She is currently on lisinopril for hypertension management and reports good tolerance to the medication.    She is on a 5 mg dose of Lexapro and occasionally contemplates the need for a dosage increase, a discussion she plans to have during her next visit.  Admits to increased anxiety however is reluctant to increase her dose at present.    MEDICATIONS  Lisinopril, Lexapro        Health Maintenance Due   Topic Date Due   • TDAP/TD VACCINES (1 - Tdap) Never done   • LUNG CANCER SCREENING  Never done   • ZOSTER VACCINE (2 of 2) 03/07/2023   • COVID-19 Vaccine (3 - 2024-25 season) 09/01/2024          Little interest or pleasure in doing things? Not at all   Feeling down, depressed, or hopeless? Not at all   PHQ-2 Total Score 0           Review of Systems   Constitutional:  Negative for chills, fatigue and fever.   HENT:  Positive for congestion.    Respiratory:  Negative for cough and shortness of breath.    Cardiovascular:  Negative for chest pain and palpitations.   Gastrointestinal:  Negative for constipation, diarrhea, nausea and vomiting.   Musculoskeletal:  Negative for back pain and neck pain.   Skin:  Negative for rash.   Neurological:  Negative for dizziness and headaches.   Psychiatric/Behavioral:  Negative for suicidal ideas. The patient is nervous/anxious.           Medical History: has a past medical history of Anemia, Anxiety, Depression, Diabetes mellitus type II, controlled, Hip arthrosis, Menopausal symptom (09/14/2015), and Rotator cuff  syndrome.     Surgical History: has a past surgical history that includes Dilation and curettage of uterus (11/01/2011); Laser ablation (11/01/2011); Colonoscopy (10/12/2020); Knee surgery (06/15/24); and Patella Open Reduction Internal Fixation (Right, 6/15/2024).     Family History: family history is not on file. She was adopted.     Social History: reports that she quit smoking about 6 years ago. Her smoking use included cigarettes. She started smoking about 41 years ago. She has a 35 pack-year smoking history. She has never used smokeless tobacco. She reports that she does not drink alcohol and does not use drugs.    Allergies: Adhesive tape and Bupropion        Current Outpatient Medications:   •  Cholecalciferol (Vitamin D) 50 MCG (2000 UT) tablet, Take 1 tablet by mouth Daily., Disp: 90 tablet, Rfl: 1  •  clobetasol (TEMOVATE) 0.05 % gel, Apply 1 Application topically to the appropriate area as directed Every 12 (Twelve) Hours., Disp: 30 g, Rfl: 1  •  clonazePAM (KlonoPIN) 0.5 MG tablet, Take 1 tablet by mouth 2 (Two) Times a Day As Needed for Anxiety., Disp: 30 tablet, Rfl: 0  •  Coenzyme Q10 (CO Q 10 PO), Take  by mouth., Disp: , Rfl:   •  COLLAGEN PO, Take  by mouth., Disp: , Rfl:   •  escitalopram (LEXAPRO) 5 MG tablet, TAKE 1 TABLET BY MOUTH DAILY, Disp: 90 tablet, Rfl: 1  •  lisinopril (PRINIVIL,ZESTRIL) 10 MG tablet, Take 1 tablet by mouth Daily., Disp: 90 tablet, Rfl: 1  •  triamcinolone (KENALOG) 0.1 % ointment, Apply 1 Application topically to the appropriate area as directed 2 (Two) Times a Day., Disp: 30 g, Rfl: 1  •  HYDROcodone-acetaminophen (NORCO) 5-325 MG per tablet, Take 1 tablet by mouth Every 4 (Four) Hours As Needed for Moderate Pain. (Patient not taking: Reported on 3/19/2025), Disp: 42 tablet, Rfl: 0  •  meclizine (ANTIVERT) 25 MG tablet, Take 1 tablet by mouth 3 (Three) Times a Day As Needed for Dizziness. (Patient not taking: Reported on 9/24/2024), Disp: 30 tablet, Rfl:  "0      Immunization History   Administered Date(s) Administered   • COVID-19 (PFIZER) Purple Cap Monovalent 06/24/2021, 07/21/2021   • Fluzone  >6mos 11/07/2024   • Fluzone Quad >6mos (Multi-dose) 10/09/2020   • Influenza Injectable Mdck Pf Quad 01/10/2023   • Pneumococcal Conjugate 13-Valent (PCV13) 10/09/2020   • Pneumococcal Conjugate 20-Valent (PCV20) 03/19/2025   • Shingrix 01/10/2023         Objective       Vitals:    03/19/25 1411 03/19/25 1421   BP: 163/92 144/91   Pulse: 84    Temp: 99 °F (37.2 °C)    TempSrc: Temporal    SpO2: 98%    Weight: 69.2 kg (152 lb 9.6 oz)    Height: 161.3 cm (63.5\")       Body mass index is 26.6 kg/m².   Wt Readings from Last 3 Encounters:   03/19/25 69.2 kg (152 lb 9.6 oz)   01/27/25 70.5 kg (155 lb 6.4 oz)   11/25/24 68.9 kg (152 lb)      BP Readings from Last 3 Encounters:   03/19/25 144/91   01/27/25 169/84   11/25/24 116/75                 Physical Exam  Vitals reviewed.   Constitutional:       Appearance: Normal appearance. She is well-developed.   HENT:      Head: Normocephalic and atraumatic.   Eyes:      Conjunctiva/sclera: Conjunctivae normal.      Pupils: Pupils are equal, round, and reactive to light.   Cardiovascular:      Rate and Rhythm: Normal rate and regular rhythm.      Heart sounds: Normal heart sounds. No murmur heard.  Pulmonary:      Effort: Pulmonary effort is normal.      Breath sounds: Normal breath sounds. No wheezing or rhonchi.   Abdominal:      General: Bowel sounds are normal. There is no distension.      Palpations: Abdomen is soft.      Tenderness: There is no abdominal tenderness.   Musculoskeletal:      Right knee: Crepitus present. Decreased range of motion.        Legs:       Comments: Post surgical pain. Pain with ROM.    Tenderness to palpation.      Skin:     General: Skin is warm and dry.   Neurological:      Mental Status: She is alert and oriented to person, place, and time.   Psychiatric:         Mood and Affect: Mood and affect normal. "         Behavior: Behavior normal.         Thought Content: Thought content normal.         Judgment: Judgment normal.     Physical Exam  Vital Signs  Blood pressure is 144/91.      Result Review :       Common labs          9/5/2024    08:47 3/19/2025    15:08   Common Labs   Glucose 104  92    BUN 13  25    Creatinine 0.67  0.67    Sodium 139  138    Potassium 3.9  3.9    Chloride 102  103    Calcium 9.5  9.3    Albumin 4.5  4.3    Total Bilirubin 0.8  0.2    Alkaline Phosphatase 94  91    AST (SGOT) 18  22    ALT (SGPT) 14  27    WBC  7.94    Hemoglobin  14.9    Hematocrit  45.3    Platelets  285    Total Cholesterol 225  258    Triglycerides 100  382    HDL Cholesterol 65  55    LDL Cholesterol  142  134    Hemoglobin A1C 5.90  6.20      Results                   Assessment and Plan          Diagnoses and all orders for this visit:    1. Annual physical exam (Primary)    2. Primary hypertension  -     CBC (No Diff); Future  -     lisinopril (PRINIVIL,ZESTRIL) 10 MG tablet; Take 1 tablet by mouth Daily.  Dispense: 90 tablet; Refill: 1  -     CBC (No Diff)    3. Mixed hyperlipidemia  -     Lipid Panel; Future  -     Comprehensive Metabolic Panel; Future  -     Comprehensive Metabolic Panel  -     Lipid Panel  -     Cancel: Lipid Panel  -     Cancel: Comprehensive Metabolic Panel    4. Screening for thyroid disorder  -     TSH; Future  -     TSH    5. Breast cancer screening by mammogram  -     Mammo Screening Digital Tomosynthesis Bilateral With CAD; Future    6. Post-menopausal  -     DEXA Bone Density Axial; Future    7. Need for influenza vaccination  -     Pneumococcal Conjugate Vaccine 20-Valent (PCV20)    8. Panic attacks  -     clonazePAM (KlonoPIN) 0.5 MG tablet; Take 1 tablet by mouth 2 (Two) Times a Day As Needed for Anxiety.  Dispense: 30 tablet; Refill: 0    9. Impaired fasting glucose  -     Hemoglobin A1c    10. Encounter for medication monitoring  -     POC Medline 12 Panel Urine Drug  Screen      Obtained a written consent for JENNA query. Discussed the risks and benefits of the use of controlled substances with the patient, including the risk of tolerance and drug dependence.  The patient has been counseled on the need to have an exit strategy, including potentially discontinuing the use of controlled substances.  JENNA has or will be reviewed as soon as it becomes available.  Assessment & Plan  1. Hypertension.  Her blood pressure reading today is 144/91. She reports that her blood pressure is usually well-controlled at home but tends to be elevated during clinic visits. She is currently tolerating her lisinopril well.    2. Mood disorder.  She is currently on a geriatric dose of Lexapro 5 mg. She has expressed a desire to increase the dosage but prefers to discuss it further at her next visit. The typical starting dose for Lexapro is 10 mg.  Patient declines increase at present.    The patient is asked to make an attempt to improve diet and exercise patterns to aid in medical management of these diagnoses.    Follow Up     Return in about 3 months (around 6/19/2025) for Next scheduled follow up.    Updated annual wellness visit checklist.  Immunizations discussed.  Screening discussed and/or ordered.  Recommend yearly dental and eye exams. Also discussed monitoring of blood pressure and lipids.      Patient was given instructions and counseling regarding her condition or for health maintenance advice. Please see specific information pulled into the AVS if appropriate.     Patient or patient representative verbalized consent for the use of Ambient Listening during the visit with  NADINE Batista for chart documentation. 3/20/2025  14:45 EDT    NADINE Batista

## 2025-03-19 ENCOUNTER — OFFICE VISIT (OUTPATIENT)
Dept: FAMILY MEDICINE CLINIC | Facility: CLINIC | Age: 65
End: 2025-03-19
Payer: COMMERCIAL

## 2025-03-19 VITALS
DIASTOLIC BLOOD PRESSURE: 91 MMHG | TEMPERATURE: 99 F | BODY MASS INDEX: 26.05 KG/M2 | SYSTOLIC BLOOD PRESSURE: 144 MMHG | WEIGHT: 152.6 LBS | HEIGHT: 64 IN | HEART RATE: 84 BPM | OXYGEN SATURATION: 98 %

## 2025-03-19 DIAGNOSIS — F41.0 PANIC ATTACKS: ICD-10-CM

## 2025-03-19 DIAGNOSIS — I10 PRIMARY HYPERTENSION: ICD-10-CM

## 2025-03-19 DIAGNOSIS — Z51.81 ENCOUNTER FOR MEDICATION MONITORING: ICD-10-CM

## 2025-03-19 DIAGNOSIS — R73.01 IMPAIRED FASTING GLUCOSE: ICD-10-CM

## 2025-03-19 DIAGNOSIS — E78.2 MIXED HYPERLIPIDEMIA: ICD-10-CM

## 2025-03-19 DIAGNOSIS — Z78.0 POST-MENOPAUSAL: ICD-10-CM

## 2025-03-19 DIAGNOSIS — Z00.00 ANNUAL PHYSICAL EXAM: Primary | ICD-10-CM

## 2025-03-19 DIAGNOSIS — Z12.31 BREAST CANCER SCREENING BY MAMMOGRAM: ICD-10-CM

## 2025-03-19 DIAGNOSIS — Z23 NEED FOR INFLUENZA VACCINATION: ICD-10-CM

## 2025-03-19 DIAGNOSIS — Z13.29 SCREENING FOR THYROID DISORDER: ICD-10-CM

## 2025-03-19 LAB
AMPHET+METHAMPHET UR QL: NEGATIVE
AMPHETAMINE INTERNAL CONTROL: ABNORMAL
AMPHETAMINES UR QL: NEGATIVE
BARBITURATE INTERNAL CONTROL: ABNORMAL
BARBITURATES UR QL SCN: NEGATIVE
BENZODIAZ UR QL SCN: NEGATIVE
BENZODIAZEPINE INTERNAL CONTROL: ABNORMAL
BUPRENORPHINE INTERNAL CONTROL: ABNORMAL
BUPRENORPHINE SERPL-MCNC: NEGATIVE NG/ML
CANNABINOIDS SERPL QL: POSITIVE
COCAINE INTERNAL CONTROL: ABNORMAL
COCAINE UR QL: NEGATIVE
DEPRECATED RDW RBC AUTO: 38.1 FL (ref 37–54)
ERYTHROCYTE [DISTWIDTH] IN BLOOD BY AUTOMATED COUNT: 11.9 % (ref 12.3–15.4)
EXPIRATION DATE: ABNORMAL
HBA1C MFR BLD: 6.2 % (ref 4.8–5.6)
HCT VFR BLD AUTO: 45.3 % (ref 34–46.6)
HGB BLD-MCNC: 14.9 G/DL (ref 12–15.9)
Lab: ABNORMAL
MCH RBC QN AUTO: 28.9 PG (ref 26.6–33)
MCHC RBC AUTO-ENTMCNC: 32.9 G/DL (ref 31.5–35.7)
MCV RBC AUTO: 88 FL (ref 79–97)
MDMA (ECSTASY) INTERNAL CONTROL: ABNORMAL
MDMA UR QL SCN: NEGATIVE
METHADONE INTERNAL CONTROL: ABNORMAL
METHADONE UR QL SCN: NEGATIVE
METHAMPHETAMINE INTERNAL CONTROL: ABNORMAL
MORPHINE INTERNAL CONTROL: ABNORMAL
MORPHINE/OPIATES SCREEN, URINE: NEGATIVE
OXYCODONE INTERNAL CONTROL: ABNORMAL
OXYCODONE UR QL SCN: NEGATIVE
PCP UR QL SCN: NEGATIVE
PHENCYCLIDINE INTERNAL CONTROL: ABNORMAL
PLATELET # BLD AUTO: 285 10*3/MM3 (ref 140–450)
PMV BLD AUTO: 10 FL (ref 6–12)
RBC # BLD AUTO: 5.15 10*6/MM3 (ref 3.77–5.28)
THC INTERNAL CONTROL: ABNORMAL
WBC NRBC COR # BLD AUTO: 7.94 10*3/MM3 (ref 3.4–10.8)

## 2025-03-19 PROCEDURE — 80050 GENERAL HEALTH PANEL: CPT | Performed by: NURSE PRACTITIONER

## 2025-03-19 PROCEDURE — 83036 HEMOGLOBIN GLYCOSYLATED A1C: CPT | Performed by: NURSE PRACTITIONER

## 2025-03-19 PROCEDURE — 80061 LIPID PANEL: CPT | Performed by: NURSE PRACTITIONER

## 2025-03-19 RX ORDER — CLONAZEPAM 0.5 MG/1
0.5 TABLET ORAL 2 TIMES DAILY PRN
Qty: 30 TABLET | Refills: 0 | Status: SHIPPED | OUTPATIENT
Start: 2025-03-19

## 2025-03-19 RX ORDER — LISINOPRIL 10 MG/1
10 TABLET ORAL DAILY
Qty: 90 TABLET | Refills: 1 | Status: SHIPPED | OUTPATIENT
Start: 2025-03-19

## 2025-03-20 LAB
ALBUMIN SERPL-MCNC: 4.3 G/DL (ref 3.5–5.2)
ALBUMIN/GLOB SERPL: 1.5 G/DL
ALP SERPL-CCNC: 91 U/L (ref 39–117)
ALT SERPL W P-5'-P-CCNC: 27 U/L (ref 1–33)
ANION GAP SERPL CALCULATED.3IONS-SCNC: 14 MMOL/L (ref 5–15)
AST SERPL-CCNC: 22 U/L (ref 1–32)
BILIRUB SERPL-MCNC: 0.2 MG/DL (ref 0–1.2)
BUN SERPL-MCNC: 25 MG/DL (ref 8–23)
BUN/CREAT SERPL: 37.3 (ref 7–25)
CALCIUM SPEC-SCNC: 9.3 MG/DL (ref 8.6–10.5)
CHLORIDE SERPL-SCNC: 103 MMOL/L (ref 98–107)
CHOLEST SERPL-MCNC: 258 MG/DL (ref 0–200)
CO2 SERPL-SCNC: 21 MMOL/L (ref 22–29)
CREAT SERPL-MCNC: 0.67 MG/DL (ref 0.57–1)
EGFRCR SERPLBLD CKD-EPI 2021: 97.1 ML/MIN/1.73
GLOBULIN UR ELPH-MCNC: 2.8 GM/DL
GLUCOSE SERPL-MCNC: 92 MG/DL (ref 65–99)
HDLC SERPL-MCNC: 55 MG/DL (ref 40–60)
LDLC SERPL CALC-MCNC: 134 MG/DL (ref 0–100)
LDLC/HDLC SERPL: 2.3 {RATIO}
POTASSIUM SERPL-SCNC: 3.9 MMOL/L (ref 3.5–5.2)
PROT SERPL-MCNC: 7.1 G/DL (ref 6–8.5)
SODIUM SERPL-SCNC: 138 MMOL/L (ref 136–145)
TRIGL SERPL-MCNC: 382 MG/DL (ref 0–150)
TSH SERPL DL<=0.05 MIU/L-ACNC: 2.16 UIU/ML (ref 0.27–4.2)
VLDLC SERPL-MCNC: 69 MG/DL (ref 5–40)

## 2025-04-25 DIAGNOSIS — F41.0 PANIC ATTACKS: ICD-10-CM

## 2025-04-25 DIAGNOSIS — I10 PRIMARY HYPERTENSION: ICD-10-CM

## 2025-04-25 RX ORDER — LISINOPRIL 10 MG/1
10 TABLET ORAL DAILY
Qty: 90 TABLET | Refills: 1 | Status: SHIPPED | OUTPATIENT
Start: 2025-04-25

## 2025-04-25 RX ORDER — CLONAZEPAM 0.5 MG/1
0.5 TABLET ORAL 2 TIMES DAILY PRN
Qty: 30 TABLET | Refills: 0 | Status: SHIPPED | OUTPATIENT
Start: 2025-04-25

## 2025-04-28 ENCOUNTER — OFFICE VISIT (OUTPATIENT)
Dept: FAMILY MEDICINE CLINIC | Facility: CLINIC | Age: 65
End: 2025-04-28
Payer: COMMERCIAL

## 2025-04-28 VITALS
DIASTOLIC BLOOD PRESSURE: 80 MMHG | WEIGHT: 155.4 LBS | TEMPERATURE: 97.5 F | OXYGEN SATURATION: 97 % | SYSTOLIC BLOOD PRESSURE: 145 MMHG | BODY MASS INDEX: 26.53 KG/M2 | HEART RATE: 77 BPM | HEIGHT: 64 IN

## 2025-04-28 DIAGNOSIS — L30.9 DERMATITIS: Primary | ICD-10-CM

## 2025-04-28 DIAGNOSIS — E55.9 VITAMIN D DEFICIENCY: ICD-10-CM

## 2025-04-28 PROCEDURE — 99213 OFFICE O/P EST LOW 20 MIN: CPT | Performed by: NURSE PRACTITIONER

## 2025-04-28 RX ORDER — METHYLPREDNISOLONE 4 MG/1
TABLET ORAL
Qty: 21 TABLET | Refills: 0 | Status: SHIPPED | OUTPATIENT
Start: 2025-04-28

## 2025-04-28 RX ORDER — HYDROXYZINE HYDROCHLORIDE 25 MG/1
25 TABLET, FILM COATED ORAL EVERY 6 HOURS PRN
Qty: 30 TABLET | Refills: 1 | Status: SHIPPED | OUTPATIENT
Start: 2025-04-28

## 2025-04-28 RX ORDER — CLOBETASOL PROPIONATE 0.05 MG/G
1 GEL TOPICAL EVERY 12 HOURS SCHEDULED
Qty: 30 G | Refills: 1 | Status: SHIPPED | OUTPATIENT
Start: 2025-04-28

## 2025-04-28 RX ORDER — CHOLECALCIFEROL (VITAMIN D3) 50 MCG
2000 TABLET ORAL DAILY
Qty: 90 TABLET | Refills: 1 | Status: SHIPPED | OUTPATIENT
Start: 2025-04-28

## 2025-04-28 NOTE — PROGRESS NOTES
Chief Complaint  Itching ( Both arms)    Subjective          Divina Virk is a 64 y.o. female who presents to [facility]    History of Present Illness  History of Present Illness    Dermatitis that is intermittent and unresolved since before May 23, 2024 patient has rash breaks out becomes a little bubble itches she scratches it it turns to a scab and then will resolve patient denies that itching is worse at bedtime or with showering.  Denies any new lotions soaps or detergents.    The PHQ has not been completed during this encounter.               Health Maintenance Due   Topic Date Due    TDAP/TD VACCINES (1 - Tdap) Never done    LUNG CANCER SCREENING  Never done    ZOSTER VACCINE (2 of 2) 03/07/2023    COVID-19 Vaccine (3 - 2024-25 season) 09/01/2024        Review of Systems   Skin:  Positive for rash.          Medical History: has a past medical history of Anemia, Anxiety, Depression, Diabetes mellitus type II, controlled, Hip arthrosis, Menopausal symptom (09/14/2015), and Rotator cuff syndrome.     Surgical History: has a past surgical history that includes Dilation and curettage of uterus (11/01/2011); Laser ablation (11/01/2011); Colonoscopy (10/12/2020); Knee surgery (06/15/24); and Patella Open Reduction Internal Fixation (Right, 6/15/2024).     Family History: family history is not on file. She was adopted.     Social History: reports that she quit smoking about 6 years ago. Her smoking use included cigarettes. She started smoking about 41 years ago. She has a 35 pack-year smoking history. She has never used smokeless tobacco. She reports that she does not drink alcohol and does not use drugs.    Allergies: Adhesive tape and Bupropion      Current Outpatient Medications:     Cholecalciferol (Vitamin D) 50 MCG (2000 UT) tablet, Take 1 tablet by mouth Daily., Disp: 90 tablet, Rfl: 1    clobetasol (TEMOVATE) 0.05 % gel, Apply 1 Application topically to the appropriate  "area as directed Every 12 (Twelve) Hours., Disp: 30 g, Rfl: 1    clonazePAM (KlonoPIN) 0.5 MG tablet, Take 1 tablet by mouth 2 (Two) Times a Day As Needed for Anxiety., Disp: 30 tablet, Rfl: 0    Coenzyme Q10 (CO Q 10 PO), Take  by mouth., Disp: , Rfl:     COLLAGEN PO, Take  by mouth., Disp: , Rfl:     escitalopram (LEXAPRO) 5 MG tablet, TAKE 1 TABLET BY MOUTH DAILY, Disp: 90 tablet, Rfl: 1    lisinopril (PRINIVIL,ZESTRIL) 10 MG tablet, Take 1 tablet by mouth Daily., Disp: 90 tablet, Rfl: 1    hydrOXYzine (ATARAX) 25 MG tablet, Take 1 tablet by mouth Every 6 (Six) Hours As Needed for Itching., Disp: 30 tablet, Rfl: 1    methylPREDNISolone (MEDROL) 4 MG dose pack, Take as directed on package instructions., Disp: 21 tablet, Rfl: 0      Immunization History   Administered Date(s) Administered    COVID-19 (PFIZER) Purple Cap Monovalent 06/24/2021, 07/21/2021    Fluzone  >6mos 11/07/2024    Fluzone Quad >6mos (Multi-dose) 10/09/2020    Influenza Injectable Mdck Pf Quad 01/10/2023    Pneumococcal Conjugate 13-Valent (PCV13) 10/09/2020    Pneumococcal Conjugate 20-Valent (PCV20) 03/19/2025    Shingrix 01/10/2023         Objective       Vitals:    04/28/25 1418 04/28/25 1425   BP: 147/89 145/80   Pulse: 77    Temp: 97.5 °F (36.4 °C)    TempSrc: Temporal    SpO2: 97%    Weight: 70.5 kg (155 lb 6.4 oz)    Height: 161.3 cm (63.5\")       Body mass index is 27.09 kg/m².   Wt Readings from Last 3 Encounters:   04/28/25 70.5 kg (155 lb 6.4 oz)   03/19/25 69.2 kg (152 lb 9.6 oz)   01/27/25 70.5 kg (155 lb 6.4 oz)      BP Readings from Last 3 Encounters:   04/28/25 145/80   03/19/25 144/91   01/27/25 169/84             Physical Exam  Constitutional:       Appearance: Normal appearance.   Cardiovascular:      Pulses: Normal pulses.   Pulmonary:      Effort: Pulmonary effort is normal.   Musculoskeletal:         General: Normal range of motion.   Skin:     General: Skin is warm and dry.      Comments: Raised papules and excoriates tara " upper extremities and right lower ext.      Neurological:      General: No focal deficit present.      Mental Status: She is alert and oriented to person, place, and time. Mental status is at baseline.   Psychiatric:         Mood and Affect: Mood normal.         Behavior: Behavior normal.         Thought Content: Thought content normal.         Judgment: Judgment normal.         Physical Exam        Result Review :   Results           Common labs          9/5/2024    08:47 3/19/2025    15:08   Common Labs   Glucose 104  92    BUN 13  25    Creatinine 0.67  0.67    Sodium 139  138    Potassium 3.9  3.9    Chloride 102  103    Calcium 9.5  9.3    Albumin 4.5  4.3    Total Bilirubin 0.8  0.2    Alkaline Phosphatase 94  91    AST (SGOT) 18  22    ALT (SGPT) 14  27    WBC  7.94    Hemoglobin  14.9    Hematocrit  45.3    Platelets  285    Total Cholesterol 225  258    Triglycerides 100  382    HDL Cholesterol 65  55    LDL Cholesterol  142  134    Hemoglobin A1C 5.90  6.20                     Assessment and Plan        Diagnoses and all orders for this visit:    1. Dermatitis (Primary)  -     clobetasol (TEMOVATE) 0.05 % gel; Apply 1 Application topically to the appropriate area as directed Every 12 (Twelve) Hours.  Dispense: 30 g; Refill: 1  -     methylPREDNISolone (MEDROL) 4 MG dose pack; Take as directed on package instructions.  Dispense: 21 tablet; Refill: 0  -     hydrOXYzine (ATARAX) 25 MG tablet; Take 1 tablet by mouth Every 6 (Six) Hours As Needed for Itching.  Dispense: 30 tablet; Refill: 1    2. Vitamin D deficiency  -     Cholecalciferol (Vitamin D) 50 MCG (2000 UT) tablet; Take 1 tablet by mouth Daily.  Dispense: 90 tablet; Refill: 1        Assessment & Plan      Return if symptoms worsen or fail to improve.    Patient was given instructions and counseling regarding her condition or for health maintenance advice. Please see specific information pulled into the AVS if appropriate.     Nel Juarez  NADINE    Patient or patient representative verbalized consent for the use of Ambient Listening during the visit with  NADINE Batista for chart documentation. 4/28/2025  14:37 EDT

## 2025-06-09 NOTE — PROGRESS NOTES
Chief Complaint  Hypertension (FOLLOW UP)    Subjective          Divina Virk is a 64 y.o. female who presents to [facility]      Pertinent negative symptoms include no chest pain, no chills, no cough, no fatigue, no fever, no headaches, no nausea, no neck pain, no rash and no vomiting.     History of Present Illness  The patient presents for evaluation of hypertension, stress, and right knee pain.    She reports that her blood pressure elevates during periods of emotional distress at home. She adheres to her prescribed antihypertensive medication regimen daily.    She experiences stress when interacting with her son and his family, particularly when they require her assistance. She occasionally resorts to taking half a tablet of Klonopin to manage these stressful interactions. Despite these challenges, she maintains a positive outlook, reporting more good days than bad. However, she is currently experiencing sleep disturbances, which she attributes to an inability to quiet her mind. She expresses reluctance to use Klonopin as a sleep aid, preferring to reserve it for calming and relaxation purposes.    She has gained approximately 3 to 4 pounds recently. Her physical activity is limited due to knee pain, restricting her to walking and avoiding any activities that exert pressure on her knee. She also reports difficulty ascending stairs without support. She has been incorporating chicken and fish into her diet on a weekly basis, even though she dislikes these foods.    She has declined the offer for a low-dose CT scan for lung cancer screening.    The patient is scheduled to have a cage removed from her leg and expresses concerns about the procedure. She recalls a previous surgery where circulation was cut off to her leg, resulting in significant bruising.      The PHQ has not been completed during this encounter.               Health Maintenance Due   Topic Date Due    TDAP/TD  VACCINES (1 - Tdap) Never done    ZOSTER VACCINE (2 of 2) 03/07/2023    COVID-19 Vaccine (3 - 2024-25 season) 09/01/2024        Review of Systems   Constitutional:  Negative for chills, fatigue and fever.   Respiratory:  Negative for cough and shortness of breath.    Cardiovascular:  Negative for chest pain and palpitations.   Gastrointestinal:  Negative for constipation, diarrhea, nausea and vomiting.   Musculoskeletal:  Positive for arthralgias (RIGHT KNEE PAIN). Negative for back pain and neck pain.   Skin:  Negative for rash.   Neurological:  Negative for dizziness and headaches.   Psychiatric/Behavioral:  Negative for self-injury, sleep disturbance and suicidal ideas. The patient is nervous/anxious.           Medical History: has a past medical history of Anemia, Anxiety, Depression, Diabetes mellitus type II, controlled, Hip arthrosis, Menopausal symptom (09/14/2015), and Rotator cuff syndrome.     Surgical History: has a past surgical history that includes Dilation and curettage of uterus (11/01/2011); Laser ablation (11/01/2011); Colonoscopy (10/12/2020); Knee surgery (06/15/24); and Patella Open Reduction Internal Fixation (Right, 6/15/2024).     Family History: family history is not on file. She was adopted.     Social History: reports that she quit smoking about 6 years ago. Her smoking use included cigarettes. She started smoking about 41 years ago. She has a 35 pack-year smoking history. She has never used smokeless tobacco. She reports that she does not drink alcohol and does not use drugs.    Allergies: Adhesive tape and Bupropion      Current Outpatient Medications:     Cholecalciferol (Vitamin D) 50 MCG (2000 UT) tablet, Take 1 tablet by mouth Daily., Disp: 90 tablet, Rfl: 1    clobetasol (TEMOVATE) 0.05 % gel, Apply 1 Application topically to the appropriate area as directed Every 12 (Twelve) Hours., Disp: 30 g, Rfl: 1    clonazePAM (KlonoPIN) 0.5 MG tablet, Take 1 tablet by mouth 2 (Two) Times a  "Day As Needed for Anxiety., Disp: 30 tablet, Rfl: 0    Coenzyme Q10 (CO Q 10 PO), Take  by mouth., Disp: , Rfl:     COLLAGEN PO, Take  by mouth., Disp: , Rfl:     escitalopram (LEXAPRO) 5 MG tablet, TAKE 1 TABLET BY MOUTH DAILY, Disp: 90 tablet, Rfl: 1    hydrOXYzine (ATARAX) 25 MG tablet, Take 1 tablet by mouth Every 6 (Six) Hours As Needed for Itching., Disp: 30 tablet, Rfl: 1    lisinopril (PRINIVIL,ZESTRIL) 10 MG tablet, Take 1 tablet by mouth Daily., Disp: 90 tablet, Rfl: 1      Immunization History   Administered Date(s) Administered    COVID-19 (PFIZER) Purple Cap Monovalent 06/24/2021, 07/21/2021    Fluzone  >6mos 11/07/2024    Fluzone Quad >6mos (Multi-dose) 10/09/2020    Influenza Injectable Mdck Pf Quad 01/10/2023    Pneumococcal Conjugate 13-Valent (PCV13) 10/09/2020    Pneumococcal Conjugate 20-Valent (PCV20) 03/19/2025    Shingrix 01/10/2023         Objective       Vitals:    06/12/25 0724 06/12/25 0725   BP: 146/78 137/79   Pulse: 91    Temp: 98.6 °F (37 °C)    TempSrc: Temporal    SpO2: 95%    Weight: 71.4 kg (157 lb 6.4 oz)    Height: 161.3 cm (63.5\")       Body mass index is 27.44 kg/m².   Wt Readings from Last 3 Encounters:   06/12/25 71.4 kg (157 lb 6.4 oz)   04/28/25 70.5 kg (155 lb 6.4 oz)   03/19/25 69.2 kg (152 lb 9.6 oz)      BP Readings from Last 3 Encounters:   06/12/25 137/79   04/28/25 145/80   03/19/25 144/91             Physical Exam  Vitals reviewed.   Constitutional:       Appearance: Normal appearance. She is well-developed.   HENT:      Head: Normocephalic and atraumatic.   Eyes:      Conjunctiva/sclera: Conjunctivae normal.      Pupils: Pupils are equal, round, and reactive to light.   Cardiovascular:      Rate and Rhythm: Normal rate and regular rhythm.      Heart sounds: Normal heart sounds. No murmur heard.  Pulmonary:      Effort: Pulmonary effort is normal.      Breath sounds: Normal breath sounds. No wheezing or rhonchi.   Abdominal:      General: Bowel sounds are normal. " There is no distension.      Palpations: Abdomen is soft.      Tenderness: There is no abdominal tenderness.   Skin:     General: Skin is warm and dry.   Neurological:      Mental Status: She is alert and oriented to person, place, and time.   Psychiatric:         Mood and Affect: Mood and affect normal.         Behavior: Behavior normal.         Thought Content: Thought content normal.         Judgment: Judgment normal.         Physical Exam  Skin: Bruising noted on the back of the thigh and up and down the leg.      Result Review :   Results  Labs   - Blood test: 03/2025, Normal         Common labs          9/5/2024    08:47 3/19/2025    15:08   Common Labs   Glucose 104  92    BUN 13  25    Creatinine 0.67  0.67    Sodium 139  138    Potassium 3.9  3.9    Chloride 102  103    Calcium 9.5  9.3    Albumin 4.5  4.3    Total Bilirubin 0.8  0.2    Alkaline Phosphatase 94  91    AST (SGOT) 18  22    ALT (SGPT) 14  27    WBC  7.94    Hemoglobin  14.9    Hematocrit  45.3    Platelets  285    Total Cholesterol 225  258    Triglycerides 100  382    HDL Cholesterol 65  55    LDL Cholesterol  142  134    Hemoglobin A1C 5.90  6.20                     Assessment and Plan        Diagnoses and all orders for this visit:    1. 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 Hypertension.  - Blood pressure is elevated when upset at home.  - Takes blood pressure medication daily as directed.  - No changes to current medication regimen are necessary at this time.    2. Stress.  - Experiences stress, particularly with family situations involving her son and grandchildren.  - Uses Klonopin as needed to calm down during these episodes.  - Will consider increasing Lexapro dosage due to ongoing sleep disturbances but prefers not to use Klonopin for sleep.  - This will be revisited in 6 months.    3. Right knee pain.  - Reports difficulty exercising due to knee pain and describes a sensation of the skin  being tight and pulling.  - Informed about the risks of sepsis associated with surgical intervention on the knee.  - No immediate surgical intervention planned.    4. Health maintenance.  - Declined the offer for a low-dose CT scan for lung cancer screening.  - Will follow up in 6 months.        Patient was given instructions and counseling regarding her condition or for health maintenance advice. Please see specific information pulled into the AVS if appropriate.     NADINE Batista    Patient or patient representative verbalized consent for the use of Ambient Listening during the visit with  NADINE Batista for chart documentation. 6/12/2025  07:36 EDT

## 2025-06-12 ENCOUNTER — OFFICE VISIT (OUTPATIENT)
Dept: FAMILY MEDICINE CLINIC | Facility: CLINIC | Age: 65
End: 2025-06-12
Payer: COMMERCIAL

## 2025-06-12 VITALS
OXYGEN SATURATION: 95 % | HEART RATE: 91 BPM | TEMPERATURE: 98.6 F | DIASTOLIC BLOOD PRESSURE: 79 MMHG | WEIGHT: 157.4 LBS | HEIGHT: 64 IN | SYSTOLIC BLOOD PRESSURE: 137 MMHG | BODY MASS INDEX: 26.87 KG/M2

## 2025-06-12 DIAGNOSIS — F17.211 CIGARETTE NICOTINE DEPENDENCE IN REMISSION: Primary | ICD-10-CM

## 2025-06-12 PROCEDURE — 99213 OFFICE O/P EST LOW 20 MIN: CPT | Performed by: NURSE PRACTITIONER

## 2025-06-12 NOTE — PATIENT INSTRUCTIONS
"High Cholesterol    High cholesterol is a condition in which the blood has high levels of a white, waxy substance similar to fat (cholesterol). The liver makes all the cholesterol that the body needs. The human body needs small amounts of cholesterol to help build cells. A person gets extra or excess cholesterol from the food that he or she eats.  The blood carries cholesterol from the liver to the rest of the body. If you have high cholesterol, deposits (plaques) may build up on the walls of your arteries. Arteries are the blood vessels that carry blood away from your heart. These plaques make the arteries narrow and stiff.  Cholesterol plaques increase your risk for heart attack and stroke. Work with your health care provider to keep your cholesterol levels in a healthy range.  What increases the risk?  The following factors may make you more likely to develop this condition:  Eating foods that are high in animal fat (saturated fat) or cholesterol.  Being overweight.  Not getting enough exercise.  A family history of high cholesterol (familial hypercholesterolemia).  Use of tobacco products.  Having diabetes.  What are the signs or symptoms?  In most cases, high cholesterol does not usually cause any symptoms.  In severe cases, very high cholesterol levels can cause:  Fatty bumps under the skin (xanthomas).  A white or gray ring around the black center (pupil) of the eye.  How is this diagnosed?  This condition may be diagnosed based on the results of a blood test.  If you are older than 20 years of age, your health care provider may check your cholesterol levels every 4-6 years.  You may be checked more often if you have high cholesterol or other risk factors for heart disease.  The blood test for cholesterol measures:  \"Bad\" cholesterol, or LDL cholesterol. This is the main type of cholesterol that causes heart disease. The desired level is less than 100 mg/dL (2.59 mmol/L).  \"Good\" cholesterol, or HDL " cholesterol. HDL helps protect against heart disease by cleaning the arteries and carrying the LDL to the liver for processing. The desired level for HDL is 60 mg/dL (1.55 mmol/L) or higher.  Triglycerides. These are fats that your body can store or burn for energy. The desired level is less than 150 mg/dL (1.69 mmol/L).  Total cholesterol. This measures the total amount of cholesterol in your blood and includes LDL, HDL, and triglycerides. The desired level is less than 200 mg/dL (5.17 mmol/L).  How is this treated?  Treatment for high cholesterol starts with lifestyle changes, such as diet and exercise.  Diet changes. You may be asked to eat foods that have more fiber and less saturated fats or added sugar.  Lifestyle changes. These may include regular exercise, maintaining a healthy weight, and quitting use of tobacco products.  Medicines. These are given when diet and lifestyle changes have not worked. You may be prescribed a statin medicine to help lower your cholesterol levels.  Follow these instructions at home:  Eating and drinking    Eat a healthy, balanced diet. This diet includes:  Daily servings of a variety of fresh, frozen, or canned fruits and vegetables.  Daily servings of whole grain foods that are rich in fiber.  Foods that are low in saturated fats and trans fats. These include poultry and fish without skin, lean cuts of meat, and low-fat dairy products.  A variety of fish, especially oily fish that contain omega-3 fatty acids. Aim to eat fish at least 2 times a week.  Avoid foods and drinks that have added sugar.  Use healthy cooking methods, such as roasting, grilling, broiling, baking, poaching, steaming, and stir-frying. Do not awad your food except for stir-frying.  If you drink alcohol:  Limit how much you have to:  0-1 drink a day for women who are not pregnant.  0-2 drinks a day for men.  Know how much alcohol is in a drink. In the [place] one drink equals one 12 oz bottle of beer (355 mL),  "one 5 oz glass of wine (148 mL), or one 1½ oz glass of hard liquor (44 mL).  Lifestyle    Get regular exercise. Aim to exercise for a total of 150 minutes a week. Increase your activity level by doing activities such as gardening, walking, and taking the stairs.  Do not use any products that contain nicotine or tobacco. These products include cigarettes, chewing tobacco, and vaping devices, such as e-cigarettes. If you need help quitting, ask your health care provider.  General instructions  Take over-the-counter and prescription medicines only as told by your health care provider.  Keep all follow-up visits. This is important.  Where to find more information  American Heart Association: www.heart.org  National Heart, Lung, and Blood [place]: www.nhlbi.nih.gov  Contact a health care provider if:  You have trouble achieving or maintaining a healthy diet or weight.  You are starting an exercise program.  You are unable to stop smoking.  Get help right away if:  You have chest pain.  You have trouble breathing.  You have discomfort or pain in your jaw, neck, back, shoulder, or arm.  You have any symptoms of a stroke. \"BE FAST\" is an easy way to remember the main warning signs of a stroke:  B - Balance. Signs are dizziness, sudden trouble walking, or loss of balance.  E - Eyes. Signs are trouble seeing or a sudden change in vision.  F - Face. Signs are sudden weakness or numbness of the face, or the face or eyelid drooping on one side.  A - Arms. Signs are weakness or numbness in an arm. This happens suddenly and usually on one side of the body.  S - Speech. Signs are sudden trouble speaking, slurred speech, or trouble understanding what people say.  T - Time. Time to call emergency services. Write down what time symptoms started.  You have other signs of a stroke, such as:  A sudden, severe headache with no known cause.  Nausea or vomiting.  Seizure.  These symptoms may represent a serious problem that is an " emergency. Do not wait to see if the symptoms will go away. Get medical help right away. Call your local emergency services (911 in the [place] Do not drive yourself to the hospital.  Summary  Cholesterol plaques increase your risk for heart attack and stroke. Work with your health care provider to keep your cholesterol levels in a healthy range.  Eat a healthy, balanced diet, get regular exercise, and maintain a healthy weight.  Do not use any products that contain nicotine or tobacco. These products include cigarettes, chewing tobacco, and vaping devices, such as e-cigarettes.  Get help right away if you have any symptoms of a stroke.  This information is not intended to replace advice given to you by your health care provider. Make sure you discuss any questions you have with your health care provider.  Document Revised: 07/21/2023 Document Reviewed: 02/21/2022  [employer] Patient Education © 2024 [employer]

## 2025-08-04 ENCOUNTER — LAB (OUTPATIENT)
Dept: LAB | Facility: HOSPITAL | Age: 65
End: 2025-08-04
Payer: COMMERCIAL

## 2025-08-04 DIAGNOSIS — R73.03 PREDIABETES: ICD-10-CM

## 2025-08-04 LAB — HBA1C MFR BLD: 6.2 % (ref 4.8–5.6)

## 2025-08-04 PROCEDURE — 83036 HEMOGLOBIN GLYCOSYLATED A1C: CPT

## 2025-08-04 PROCEDURE — 36415 COLL VENOUS BLD VENIPUNCTURE: CPT
